# Patient Record
Sex: MALE | Race: WHITE | Employment: OTHER | URBAN - METROPOLITAN AREA
[De-identification: names, ages, dates, MRNs, and addresses within clinical notes are randomized per-mention and may not be internally consistent; named-entity substitution may affect disease eponyms.]

---

## 2018-07-10 ENCOUNTER — HOSPITAL ENCOUNTER (OUTPATIENT)
Dept: ULTRASOUND IMAGING | Age: 63
Discharge: HOME | End: 2018-07-10
Attending: PEDIATRICS
Payer: COMMERCIAL

## 2018-07-10 DIAGNOSIS — I71.40 ABDOMINAL AORTIC ANEURYSM WITHOUT RUPTURE: ICD-10-CM

## 2018-07-10 PROCEDURE — 76775 US EXAM ABDO BACK WALL LIM: CPT

## 2018-07-19 ENCOUNTER — APPOINTMENT (OUTPATIENT)
Dept: CT IMAGING | Age: 63
End: 2018-07-19
Attending: EMERGENCY MEDICINE
Payer: COMMERCIAL

## 2018-07-19 ENCOUNTER — APPOINTMENT (OUTPATIENT)
Dept: GENERAL RADIOLOGY | Age: 63
End: 2018-07-19
Attending: EMERGENCY MEDICINE
Payer: COMMERCIAL

## 2018-07-19 ENCOUNTER — HOSPITAL ENCOUNTER (EMERGENCY)
Age: 63
Discharge: HOME | End: 2018-07-19
Attending: EMERGENCY MEDICINE
Payer: COMMERCIAL

## 2018-07-19 VITALS
OXYGEN SATURATION: 95 % | BODY MASS INDEX: 30.84 KG/M2 | TEMPERATURE: 97.4 F | HEIGHT: 75 IN | HEART RATE: 74 BPM | RESPIRATION RATE: 16 BRPM | WEIGHT: 248.02 LBS | DIASTOLIC BLOOD PRESSURE: 87 MMHG | SYSTOLIC BLOOD PRESSURE: 145 MMHG

## 2018-07-19 DIAGNOSIS — H53.2 DIPLOPIA: ICD-10-CM

## 2018-07-19 DIAGNOSIS — R42 ORTHOSTATIC DIZZINESS: ICD-10-CM

## 2018-07-19 DIAGNOSIS — T67.5XXA HEAT EXHAUSTION, INITIAL ENCOUNTER: Primary | ICD-10-CM

## 2018-07-19 LAB
ALBUMIN SERPL-MCNC: 3.2 G/DL (ref 3.5–5)
ALBUMIN/GLOB SERPL: 0.8 {RATIO} (ref 1.1–2.2)
ALP SERPL-CCNC: 259 U/L (ref 45–117)
ALT SERPL-CCNC: 50 U/L (ref 12–78)
ANION GAP SERPL CALC-SCNC: 7 MMOL/L (ref 5–15)
APPEARANCE UR: CLEAR
AST SERPL-CCNC: 36 U/L (ref 15–37)
ATRIAL RATE: 73 BPM
BACTERIA URNS QL MICRO: NEGATIVE /HPF
BASOPHILS # BLD: 0.1 K/UL (ref 0–0.1)
BASOPHILS NFR BLD: 1 % (ref 0–1)
BILIRUB SERPL-MCNC: 0.5 MG/DL (ref 0.2–1)
BILIRUB UR QL: NEGATIVE
BUN SERPL-MCNC: 8 MG/DL (ref 6–20)
BUN/CREAT SERPL: 10 (ref 12–20)
CALCIUM SERPL-MCNC: 8.7 MG/DL (ref 8.5–10.1)
CALCULATED P AXIS, ECG09: 48 DEGREES
CALCULATED R AXIS, ECG10: 45 DEGREES
CALCULATED T AXIS, ECG11: 23 DEGREES
CHLORIDE SERPL-SCNC: 109 MMOL/L (ref 97–108)
CO2 SERPL-SCNC: 26 MMOL/L (ref 21–32)
COLOR UR: ABNORMAL
CREAT SERPL-MCNC: 0.84 MG/DL (ref 0.7–1.3)
DIAGNOSIS, 93000: NORMAL
DIFFERENTIAL METHOD BLD: NORMAL
EOSINOPHIL # BLD: 0.2 K/UL (ref 0–0.4)
EOSINOPHIL NFR BLD: 2 % (ref 0–7)
EPITH CASTS URNS QL MICRO: ABNORMAL /LPF
ERYTHROCYTE [DISTWIDTH] IN BLOOD BY AUTOMATED COUNT: 13.6 % (ref 11.5–14.5)
GLOBULIN SER CALC-MCNC: 3.9 G/DL (ref 2–4)
GLUCOSE SERPL-MCNC: 112 MG/DL (ref 65–100)
GLUCOSE UR STRIP.AUTO-MCNC: NEGATIVE MG/DL
HCT VFR BLD AUTO: 46.4 % (ref 36.6–50.3)
HGB BLD-MCNC: 15.5 G/DL (ref 12.1–17)
HGB UR QL STRIP: ABNORMAL
HYALINE CASTS URNS QL MICRO: ABNORMAL /LPF (ref 0–5)
IMM GRANULOCYTES # BLD: 0 K/UL (ref 0–0.04)
IMM GRANULOCYTES NFR BLD AUTO: 0 % (ref 0–0.5)
INR PPP: 1 (ref 0.9–1.1)
KETONES UR QL STRIP.AUTO: NEGATIVE MG/DL
LEUKOCYTE ESTERASE UR QL STRIP.AUTO: ABNORMAL
LYMPHOCYTES # BLD: 1.5 K/UL (ref 0.8–3.5)
LYMPHOCYTES NFR BLD: 16 % (ref 12–49)
MAGNESIUM SERPL-MCNC: 2.4 MG/DL (ref 1.6–2.4)
MCH RBC QN AUTO: 32 PG (ref 26–34)
MCHC RBC AUTO-ENTMCNC: 33.4 G/DL (ref 30–36.5)
MCV RBC AUTO: 95.7 FL (ref 80–99)
MONOCYTES # BLD: 0.8 K/UL (ref 0–1)
MONOCYTES NFR BLD: 9 % (ref 5–13)
NEUTS SEG # BLD: 6.5 K/UL (ref 1.8–8)
NEUTS SEG NFR BLD: 72 % (ref 32–75)
NITRITE UR QL STRIP.AUTO: NEGATIVE
NRBC # BLD: 0 K/UL (ref 0–0.01)
NRBC BLD-RTO: 0 PER 100 WBC
P-R INTERVAL, ECG05: 140 MS
PH UR STRIP: 6.5 [PH] (ref 5–8)
PLATELET # BLD AUTO: 202 K/UL (ref 150–400)
PMV BLD AUTO: 10.7 FL (ref 8.9–12.9)
POTASSIUM SERPL-SCNC: 3.5 MMOL/L (ref 3.5–5.1)
PROT SERPL-MCNC: 7.1 G/DL (ref 6.4–8.2)
PROT UR STRIP-MCNC: NEGATIVE MG/DL
PROTHROMBIN TIME: 9.6 SEC (ref 9–11.1)
Q-T INTERVAL, ECG07: 416 MS
QRS DURATION, ECG06: 90 MS
QTC CALCULATION (BEZET), ECG08: 458 MS
RBC # BLD AUTO: 4.85 M/UL (ref 4.1–5.7)
RBC #/AREA URNS HPF: ABNORMAL /HPF (ref 0–5)
SODIUM SERPL-SCNC: 142 MMOL/L (ref 136–145)
SP GR UR REFRACTOMETRY: 1.01 (ref 1–1.03)
UA: UC IF INDICATED,UAUC: ABNORMAL
UROBILINOGEN UR QL STRIP.AUTO: 0.2 EU/DL (ref 0.2–1)
VENTRICULAR RATE, ECG03: 73 BPM
WBC # BLD AUTO: 9 K/UL (ref 4.1–11.1)
WBC URNS QL MICRO: ABNORMAL /HPF (ref 0–4)

## 2018-07-19 PROCEDURE — 96360 HYDRATION IV INFUSION INIT: CPT

## 2018-07-19 PROCEDURE — 80053 COMPREHEN METABOLIC PANEL: CPT | Performed by: EMERGENCY MEDICINE

## 2018-07-19 PROCEDURE — 83735 ASSAY OF MAGNESIUM: CPT | Performed by: EMERGENCY MEDICINE

## 2018-07-19 PROCEDURE — 87086 URINE CULTURE/COLONY COUNT: CPT | Performed by: EMERGENCY MEDICINE

## 2018-07-19 PROCEDURE — 74011250636 HC RX REV CODE- 250/636: Performed by: EMERGENCY MEDICINE

## 2018-07-19 PROCEDURE — 85610 PROTHROMBIN TIME: CPT | Performed by: EMERGENCY MEDICINE

## 2018-07-19 PROCEDURE — 99285 EMERGENCY DEPT VISIT HI MDM: CPT

## 2018-07-19 PROCEDURE — 70450 CT HEAD/BRAIN W/O DYE: CPT

## 2018-07-19 PROCEDURE — 81001 URINALYSIS AUTO W/SCOPE: CPT | Performed by: EMERGENCY MEDICINE

## 2018-07-19 PROCEDURE — 36415 COLL VENOUS BLD VENIPUNCTURE: CPT | Performed by: EMERGENCY MEDICINE

## 2018-07-19 PROCEDURE — 85025 COMPLETE CBC W/AUTO DIFF WBC: CPT | Performed by: EMERGENCY MEDICINE

## 2018-07-19 PROCEDURE — 96361 HYDRATE IV INFUSION ADD-ON: CPT

## 2018-07-19 PROCEDURE — 93005 ELECTROCARDIOGRAM TRACING: CPT

## 2018-07-19 PROCEDURE — 87077 CULTURE AEROBIC IDENTIFY: CPT | Performed by: EMERGENCY MEDICINE

## 2018-07-19 RX ORDER — DEXTROSE 50 % IN WATER (D50W) INTRAVENOUS SYRINGE
25
Status: DISCONTINUED | OUTPATIENT
Start: 2018-07-19 | End: 2018-07-19

## 2018-07-19 RX ORDER — SODIUM CHLORIDE 0.9 % (FLUSH) 0.9 %
5-10 SYRINGE (ML) INJECTION EVERY 8 HOURS
Status: DISCONTINUED | OUTPATIENT
Start: 2018-07-19 | End: 2018-07-19 | Stop reason: HOSPADM

## 2018-07-19 RX ORDER — SODIUM CHLORIDE 0.9 % (FLUSH) 0.9 %
5-10 SYRINGE (ML) INJECTION AS NEEDED
Status: DISCONTINUED | OUTPATIENT
Start: 2018-07-19 | End: 2018-07-19 | Stop reason: HOSPADM

## 2018-07-19 RX ADMIN — SODIUM CHLORIDE 1000 ML: 900 INJECTION, SOLUTION INTRAVENOUS at 15:35

## 2018-07-19 RX ADMIN — SODIUM CHLORIDE 1000 ML: 900 INJECTION, SOLUTION INTRAVENOUS at 14:37

## 2018-07-19 NOTE — ED PROVIDER NOTES
EMERGENCY DEPARTMENT HISTORY AND PHYSICAL EXAM 
 
 
Date: 7/19/2018 Patient Name: Brittanie Nolasco History of Presenting Illness Chief Complaint Patient presents with  Lethargy  
  onset today  Blurred Vision  
  onset today History Provided By: Patient HPI: Brittanie Nolasco, 58 y.o. male with PMHx significant for prostate cancer and anxiety, presents via EMS to the ED with cc of gradually worsening fatigue and blurred vision. Per pt, he has been presenting with worsening fatigue over the course of the past four days with gradual worsening. Pt informs that the onset of the fatigue began after working out in the heat for the past two days when the temperature was noted to be over 100 F outside. Pt reports that the fatigue has been constant since then with exacerbation with movements and activity. Pt reports he has additionally noted blurred vision with increases in the fatigue with two episodes; once two days prior, and once today while driving. Pt reports he was driving back from [place] when he noted that the lines on the road were increasingly blurry. Of note, pt does report he was not wearing his glasses with the first episode of blurred vision, but was wearing them with the second episode. Pt lastly informs of a headache with a mild intensity alongside an associated dull sensation to the posterior base of the neck. Pt states it feels like \"I have activity at the base of my neck\". Pt states he was seen at [facility] prior to arrival with a referral to the ED for a CT. Pt reports fall with head injury one month prior without evaluation. Pt reports no OTC medications or other notable modifying factors. Pt specifically denies any fevers, chills, nausea, vomiting, diarrhea, urinary complications, hematochezia, chest pain, or SOB. PSHx: umbilical hernia, prostatectomy Social Hx: + EtOH; + Smoker; + Illicit Drugs (marijuana) PCP: PROVIDER UNKNOWN There are no other complaints, changes, or physical findings at this time. Current Facility-Administered Medications Medication Dose Route Frequency Provider Last Rate Last Dose  sodium chloride (NS) flush 5-10 mL  5-10 mL IntraVENous Q8H Mary Louise MD      
 sodium chloride (NS) flush 5-10 mL  5-10 mL IntraVENous PRN Mary Louise MD      
 
Current Outpatient Prescriptions Medication Sig Dispense Refill  oxyCODONE-acetaminophen (PERCOCET 7.5) 7.5-325 mg per tablet 1-2 tabs PO [id number] PRN Pain 30 Tab 0  
 multivitamin (ONE A DAY) tablet Take 1 Tab by mouth daily.  CAFFEINE PO Take 200 mg by mouth daily. Past History Past Medical History: 
Past Medical History:  
Diagnosis Date  Cancer ([facility] [address] PROSTATE  Psychiatric disorder ANXIETY (SITUATIONAL) Past Surgical History: 
Past Surgical History:  
Procedure Laterality Date [address] [place] UNLISTED UMBILICAL HERNIA  
 HX GI  2003  
 18 \" COLON RESECTION (DIVERTICULITIS)  HX UROLOGICAL CYSTO/BIOPSY PROSTATE Family History: 
Family History Problem Relation Age of Onset  Cancer Mother LUNG  
 Heart Disease Mother  Cancer Father PROSTATE  Diabetes Maternal Grandmother  Anesth Problems Neg Hx Social History: 
Social History Substance Use Topics  Smoking status: Current Every Day Smoker Packs/day: 1.00 Years: 47.00  Smokeless tobacco: Never Used  Alcohol use 3.0 oz/week 6 Cans of beer per week Allergies: 
No Known Allergies Review of Systems Review of Systems Constitutional: Positive for fatigue. Negative for chills and fever. Eyes: Positive for visual disturbance (blurred). Respiratory: Negative for cough and shortness of breath. Cardiovascular: Negative for chest pain and palpitations. Gastrointestinal: Negative for abdominal pain, constipation, diarrhea, nausea and vomiting. Genitourinary: Negative for frequency.   
Neurological: Negative for dizziness, weakness, numbness and headaches. All other systems reviewed and are negative. Physical Exam  
Physical Exam  
Constitutional: He is oriented to person, place, and time. He appears well-developed and well-nourished. Flushed HENT:  
Head: Normocephalic. Eyes: EOM are normal. Pupils are equal, round, and reactive to light. Peripheral vision intact Neck: Normal range of motion. Carotid bruit is not present. Cardiovascular: Normal rate and regular rhythm. Pulmonary/Chest: Effort normal and breath sounds normal.  
Abdominal: Soft. He exhibits no distension. There is no tenderness. Neurological: He is alert and oriented to person, place, and time. No cranial nerve deficit. CN 2-12 intact, 5/5 strength in all 4 extremities, Finger to nose intact, negative Romberg Skin: Skin is warm and dry. Psychiatric: He has a normal mood and affect. Nursing note and vitals reviewed. Diagnostic Study Results Labs - Recent Results (from the past 12 hour(s)) METABOLIC PANEL, COMPREHENSIVE Collection Time: 07/19/18  1:39 PM  
Result Value Ref Range Sodium 142 136 - 145 mmol/L Potassium 3.5 3.5 - 5.1 mmol/L Chloride 109 (H) 97 - 108 mmol/L  
 CO2 26 21 - 32 mmol/L Anion gap 7 5 - 15 mmol/L Glucose 112 (H) 65 - 100 mg/dL BUN 8 6 - 20 MG/DL Creatinine 0.84 0.70 - 1.30 MG/DL  
 BUN/Creatinine ratio 10 (L) 12 - 20 GFR est AA >60 >60 ml/min/1.73m2 GFR est non-AA >60 >60 ml/min/1.73m2 Calcium 8.7 8.5 - 10.1 MG/DL Bilirubin, total 0.5 0.2 - 1.0 MG/DL  
 ALT (SGPT) 50 12 - 78 U/L  
 AST (SGOT) 36 15 - 37 U/L Alk. phosphatase 259 (H) 45 - 117 U/L Protein, total 7.1 6.4 - 8.2 g/dL Albumin 3.2 (L) 3.5 - 5.0 g/dL Globulin 3.9 2.0 - 4.0 g/dL A-G Ratio 0.8 (L) 1.1 - 2.2    
CBC WITH AUTOMATED DIFF Collection Time: 07/19/18  1:39 PM  
Result Value Ref Range WBC 9.0 4.1 - 11.1 K/uL  
 RBC 4.85 4.10 - 5.70 M/uL  
 HGB 15.5 12.1 - 17.0 g/dL  HCT 46.4 36.6 - 50.3 % MCV 95.7 80.0 - 99.0 FL  
 MCH 32.0 26.0 - 34.0 PG  
 MCHC 33.4 30.0 - 36.5 g/dL  
 RDW 13.6 11.5 - 14.5 % PLATELET 559 483 - 473 K/uL MPV 10.7 8.9 - 12.9 FL  
 NRBC 0.0 0  WBC ABSOLUTE NRBC 0.00 0.00 - 0.01 K/uL NEUTROPHILS 72 32 - 75 % LYMPHOCYTES 16 12 - 49 % MONOCYTES 9 5 - 13 % EOSINOPHILS 2 0 - 7 % BASOPHILS 1 0 - 1 % IMMATURE GRANULOCYTES 0 0.0 - 0.5 % ABS. NEUTROPHILS 6.5 1.8 - 8.0 K/UL  
 ABS. LYMPHOCYTES 1.5 0.8 - 3.5 K/UL  
 ABS. MONOCYTES 0.8 0.0 - 1.0 K/UL  
 ABS. EOSINOPHILS 0.2 0.0 - 0.4 K/UL  
 ABS. BASOPHILS 0.1 0.0 - 0.1 K/UL  
 ABS. IMM. GRANS. 0.0 0.00 - 0.04 K/UL  
 DF AUTOMATED MAGNESIUM Collection Time: 07/19/18  1:39 PM  
Result Value Ref Range Magnesium 2.4 1.6 - 2.4 mg/dL PROTHROMBIN TIME + INR Collection Time: 07/19/18  1:39 PM  
Result Value Ref Range INR 1.0 0.9 - 1.1 Prothrombin time 9.6 9.0 - 11.1 sec EKG, 12 LEAD, INITIAL Collection Time: 07/19/18  2:24 PM  
Result Value Ref Range Ventricular Rate 73 BPM  
 Atrial Rate 73 BPM  
 P-R Interval 140 ms QRS Duration 90 ms Q-T Interval 416 ms  
 QTC Calculation (Bezet) 458 ms Calculated P Axis 48 degrees Calculated R Axis 45 degrees Calculated T Axis 23 degrees Diagnosis Sinus rhythm with marked sinus arrhythmia When compared with ECG of 21-JAN-2014 16:21, 
premature atrial complexes are no longer present Confirmed by Butlerzac Alberta ([id number]) on 7/19/2018 6:03:37 PM 
  
URINALYSIS W/ REFLEX CULTURE Collection Time: 07/19/18  3:54 PM  
Result Value Ref Range Color YELLOW/STRAW Appearance CLEAR CLEAR Specific gravity 1.011 1.003 - 1.030    
 pH (UA) 6.5 5.0 - 8.0 Protein NEGATIVE  NEG mg/dL Glucose NEGATIVE  NEG mg/dL Ketone NEGATIVE  NEG mg/dL Bilirubin NEGATIVE  NEG Blood TRACE (A) NEG Urobilinogen 0.2 0.2 - 1.0 EU/dL Nitrites NEGATIVE  NEG  Leukocyte Esterase SMALL (A) NEG    
 WBC 5-10 0 - 4 /hpf  
 RBC 5-10 0 - 5 /hpf Epithelial cells FEW FEW /lpf Bacteria NEGATIVE  NEG /hpf  
 UA:UC IF INDICATED URINE CULTURE ORDERED (A) CNI Hyaline cast 0-2 0 - 5 /lpf Radiologic Studies -  
CT HEAD WO CONT Final Result XR CHEST SNGL V    (Results Pending) Ct Head Wo Cont Result Date: 7/19/2018 IMPRESSION: No confluent infarct or hemorrhage or mass effect. Medical Decision Making I am the first provider for this patient. I reviewed the vital signs, available nursing notes, past medical history, past surgical history, family history and social history. Vital Signs-Reviewed the patient's vital signs. Patient Vitals for the past 12 hrs: 
 Temp Pulse Resp BP SpO2  
07/19/18 1714 - - - 145/87 95 % 07/19/18 1535 - - - 154/88 95 % 07/19/18 1457 - - - 184/89 96 % 07/19/18 1407 - - - - 93 % 07/19/18 1316 97.4 °F (36.3 °C) 74 16 146/89 94 % Pulse Oximetry Analysis - 94% on RA Cardiac Monitor:  
Rate: 74 bpm 
Rhythm: Normal Sinus Rhythm EKG interpretation: (1424) Rhythm: normal sinus rhythm; and with sinus arrhythmia. Rate (approx.): 73; Axis: normal; AZ interval: normal; QRS interval: normal ; ST/T wave: normal;  
Written by Timi Broussard, ED Scribe, as dictated by Pineda Arana MD. Records Reviewed: Nursing Notes and Old Medical Records Provider Notes (Medical Decision Making):  
Patient presenting with generalized fatigue. DDx: infection, anemia, electrolyte anomoly (hypo or hyperkalemia, hypomagnesemia), hypothyroid, dehydration, heat exhaustion, depression, CA, ACS. Will obtain EKG, UA, labwork for any urgent/emergent pathology. ED Course:  
Initial assessment performed. The patients presenting problems have been discussed, and they are in agreement with the care plan formulated and outlined with them. I have encouraged them to ask questions as they arise throughout their visit.  
 
Progress Note:  
3:15 PM 
RN informs visual acuity is 20/50 in the L eye and 20/40 in the R eye. Written by AMBREEN Guevaraibe, as dictated by Khanh Kent MD.  
 
Progress Note:  
3:38 PM 
Pt ambulated to the bathroom without syncope, but mild LH. Stable on feet. Written by AMBREEN Guevaraibe, as dictated by Khanh Kent MD.  
 
SIGN OUT:  
4:00 PM 
Patient's presentation, labs/imaging and plan of care was reviewed with Nic Shaikh DO as part of sign out. They will follow up on UA/fluids as part of the plan discussed with the patient. Nic Shaikh DO's assistance in completion of this plan is greatly appreciated but it should be noted that I will be the provider of record for this patient. This note is prepared by AMBREEN Guevara, as dictated by Khanh Kent MD 
 
Progress Note:  
4:15 PM 
Updated pt on all returned results and findings. Discussed the importance of proper follow up as referred below alongside return precautions. Pt in agreement with the further progression of care plan and expresses agreement with and understanding of all items discussed. Disposition: 
DISCHARGE NOTE: 
4:18 PM 
The patient is ready for discharge. The patient signs, symptoms, diagnosis, and discharge instructions have been discussed and the patient has conveyed their understanding. The patient is to follow-up as reccommended or returned to the ER should their symptoms worsen. Plan has been discussed and patient is in agreement. PLAN: 
1. Discharge Medication List as of 7/19/2018  4:18 PM  
  
 
2. Follow-up Information Follow up With Details Comments Contact Info Provider Unknown  As needed Patient not available to ask Optometry  If vision symptoms persist   
  
 
Return to ED if worse Diagnosis Clinical Impression: 1. Heat exhaustion, initial encounter 2. Orthostatic dizziness 3. Diplopia Attestations:  
This note is prepared by Joseph Gonzalez, acting as Scribe for Khanh Kent MD. 
 
Negrito Eduardo MD: The scribe's documentation has been prepared under my direction and personally reviewed by me in its entirety. I confirm that the note above accurately reflects all work, treatment, procedures, and medical decision making performed by me. This note will not be viewable in [address]

## 2018-07-19 NOTE — LETTER
[address] 
\[facility]\"" EMERGENCY DEPT 
[address] [place] [address] [address] [id number] 
[phone] Work/School Note Date: 7/19/2018 To Whom It May concern: 
 
Gurvinder Metzger was seen and treated today in the emergency room by the following provider(s): 
Attending Provider: Tiffanie Miller MD.   
 
Gurvinder Metzger may return to work on 7/21/18. Sincerely, Tiffanie Miller MD

## 2018-07-20 LAB
BACTERIA SPEC CULT: ABNORMAL
BACTERIA SPEC CULT: ABNORMAL
CC UR VC: ABNORMAL
SERVICE CMNT-IMP: ABNORMAL

## 2018-07-21 NOTE — PROGRESS NOTES
Pt not treated with antibiotics at time of discharge. Per chart, patient with dizziness and recent head injury. Attempted to reach patient to see if he remained symptomatic and review urine culture results to determine if antibiotics might be necessary; however, no answer. HIPAA compliant  left requesting return call.    1:02 PM  7/22/18  Pt returned call. Reviewed urine culture results. Pt remains symptomatic, fatigue and lightheadedness.   Will efile Keflex 500mg BID to [facility] at his request.

## 2018-07-22 RX ORDER — CEPHALEXIN 500 MG/1
500 CAPSULE ORAL 2 TIMES DAILY
Qty: 14 CAP | Refills: 0 | Status: SHIPPED | OUTPATIENT
Start: 2018-07-22 | End: 2018-07-29

## 2019-05-13 ENCOUNTER — APPOINTMENT (OUTPATIENT)
Dept: GENERAL RADIOLOGY | Age: 64
DRG: 309 | End: 2019-05-13
Attending: EMERGENCY MEDICINE
Payer: COMMERCIAL

## 2019-05-13 ENCOUNTER — HOSPITAL ENCOUNTER (INPATIENT)
Age: 64
LOS: 2 days | Discharge: HOME | DRG: 309 | End: 2019-05-15
Attending: EMERGENCY MEDICINE | Admitting: INTERNAL MEDICINE
Payer: COMMERCIAL

## 2019-05-13 DIAGNOSIS — I48.91 ATRIAL FIBRILLATION WITH RVR (HCC): Primary | ICD-10-CM

## 2019-05-13 PROBLEM — E78.5 HYPERLIPIDEMIA: Status: ACTIVE | Noted: 2019-05-13

## 2019-05-13 PROBLEM — I10 HTN (HYPERTENSION): Status: ACTIVE | Noted: 2019-05-13

## 2019-05-13 PROBLEM — J44.1 COPD EXACERBATION (HCC): Status: ACTIVE | Noted: 2019-05-13

## 2019-05-13 LAB
ALBUMIN SERPL-MCNC: 3.5 G/DL (ref 3.5–5)
ALBUMIN/GLOB SERPL: 0.9 {RATIO} (ref 1.1–2.2)
ALP SERPL-CCNC: 119 U/L (ref 45–117)
ALT SERPL-CCNC: 68 U/L (ref 12–78)
ANION GAP SERPL CALC-SCNC: 7 MMOL/L (ref 5–15)
AST SERPL-CCNC: 36 U/L (ref 15–37)
BASOPHILS # BLD: 0.1 K/UL (ref 0–0.1)
BASOPHILS NFR BLD: 0 % (ref 0–1)
BILIRUB SERPL-MCNC: 0.5 MG/DL (ref 0.2–1)
BUN SERPL-MCNC: 21 MG/DL (ref 6–20)
BUN/CREAT SERPL: 23 (ref 12–20)
CALCIUM SERPL-MCNC: 8.4 MG/DL (ref 8.5–10.1)
CHLORIDE SERPL-SCNC: 107 MMOL/L (ref 97–108)
CO2 SERPL-SCNC: 25 MMOL/L (ref 21–32)
CREAT SERPL-MCNC: 0.9 MG/DL (ref 0.7–1.3)
DIFFERENTIAL METHOD BLD: ABNORMAL
EOSINOPHIL # BLD: 0 K/UL (ref 0–0.4)
EOSINOPHIL NFR BLD: 0 % (ref 0–7)
ERYTHROCYTE [DISTWIDTH] IN BLOOD BY AUTOMATED COUNT: 13.5 % (ref 11.5–14.5)
GLOBULIN SER CALC-MCNC: 3.7 G/DL (ref 2–4)
GLUCOSE SERPL-MCNC: 168 MG/DL (ref 65–100)
HCT VFR BLD AUTO: 48.5 % (ref 36.6–50.3)
HGB BLD-MCNC: 16.9 G/DL (ref 12.1–17)
IMM GRANULOCYTES # BLD AUTO: 0.2 K/UL (ref 0–0.04)
IMM GRANULOCYTES NFR BLD AUTO: 1 % (ref 0–0.5)
INR PPP: 1 (ref 0.9–1.1)
LYMPHOCYTES # BLD: 1.6 K/UL (ref 0.8–3.5)
LYMPHOCYTES NFR BLD: 10 % (ref 12–49)
MAGNESIUM SERPL-MCNC: 2.2 MG/DL (ref 1.6–2.4)
MCH RBC QN AUTO: 32.3 PG (ref 26–34)
MCHC RBC AUTO-ENTMCNC: 34.8 G/DL (ref 30–36.5)
MCV RBC AUTO: 92.7 FL (ref 80–99)
MONOCYTES # BLD: 1.4 K/UL (ref 0–1)
MONOCYTES NFR BLD: 9 % (ref 5–13)
NEUTS SEG # BLD: 12.3 K/UL (ref 1.8–8)
NEUTS SEG NFR BLD: 80 % (ref 32–75)
NRBC # BLD: 0 K/UL (ref 0–0.01)
NRBC BLD-RTO: 0 PER 100 WBC
PLATELET # BLD AUTO: 267 K/UL (ref 150–400)
PMV BLD AUTO: 10.2 FL (ref 8.9–12.9)
POTASSIUM SERPL-SCNC: 4.2 MMOL/L (ref 3.5–5.1)
PROT SERPL-MCNC: 7.2 G/DL (ref 6.4–8.2)
PROTHROMBIN TIME: 10.5 SEC (ref 9–11.1)
RBC # BLD AUTO: 5.23 M/UL (ref 4.1–5.7)
SODIUM SERPL-SCNC: 139 MMOL/L (ref 136–145)
TROPONIN I SERPL-MCNC: <0.05 NG/ML
TSH SERPL DL<=0.05 MIU/L-ACNC: 0.04 UIU/ML (ref 0.36–3.74)
WBC # BLD AUTO: 15.5 K/UL (ref 4.1–11.1)

## 2019-05-13 PROCEDURE — 65660000000 HC RM CCU STEPDOWN

## 2019-05-13 PROCEDURE — 85025 COMPLETE CBC W/AUTO DIFF WBC: CPT

## 2019-05-13 PROCEDURE — 74011000250 HC RX REV CODE- 250: Performed by: EMERGENCY MEDICINE

## 2019-05-13 PROCEDURE — 83735 ASSAY OF MAGNESIUM: CPT

## 2019-05-13 PROCEDURE — 71045 X-RAY EXAM CHEST 1 VIEW: CPT

## 2019-05-13 PROCEDURE — 80053 COMPREHEN METABOLIC PANEL: CPT

## 2019-05-13 PROCEDURE — 65270000029 HC RM PRIVATE

## 2019-05-13 PROCEDURE — 96365 THER/PROPH/DIAG IV INF INIT: CPT

## 2019-05-13 PROCEDURE — 36415 COLL VENOUS BLD VENIPUNCTURE: CPT

## 2019-05-13 PROCEDURE — 85610 PROTHROMBIN TIME: CPT

## 2019-05-13 PROCEDURE — 74011000258 HC RX REV CODE- 258: Performed by: EMERGENCY MEDICINE

## 2019-05-13 PROCEDURE — 84484 ASSAY OF TROPONIN QUANT: CPT

## 2019-05-13 PROCEDURE — 96361 HYDRATE IV INFUSION ADD-ON: CPT

## 2019-05-13 PROCEDURE — 84443 ASSAY THYROID STIM HORMONE: CPT

## 2019-05-13 PROCEDURE — 99284 EMERGENCY DEPT VISIT MOD MDM: CPT

## 2019-05-13 PROCEDURE — 96366 THER/PROPH/DIAG IV INF ADDON: CPT

## 2019-05-13 PROCEDURE — 74011250636 HC RX REV CODE- 250/636: Performed by: EMERGENCY MEDICINE

## 2019-05-13 PROCEDURE — 93005 ELECTROCARDIOGRAM TRACING: CPT

## 2019-05-13 RX ORDER — BENZONATATE 100 MG/1
200 CAPSULE ORAL
Status: DISCONTINUED | OUTPATIENT
Start: 2019-05-13 | End: 2019-05-15 | Stop reason: HOSPADM

## 2019-05-13 RX ORDER — LEVALBUTEROL INHALATION SOLUTION 1.25 MG/3ML
1.25 SOLUTION RESPIRATORY (INHALATION)
Status: DISCONTINUED | OUTPATIENT
Start: 2019-05-14 | End: 2019-05-15

## 2019-05-13 RX ORDER — ALBUTEROL SULFATE 90 UG/1
AEROSOL, METERED RESPIRATORY (INHALATION)
COMMUNITY

## 2019-05-13 RX ORDER — DILTIAZEM HYDROCHLORIDE 5 MG/ML
10 INJECTION INTRAVENOUS
Status: COMPLETED | OUTPATIENT
Start: 2019-05-13 | End: 2019-05-13

## 2019-05-13 RX ORDER — PREDNISONE 10 MG/1
TABLET ORAL SEE ADMIN INSTRUCTIONS
COMMUNITY

## 2019-05-13 RX ORDER — GUAIFENESIN 600 MG/1
600 TABLET, EXTENDED RELEASE ORAL EVERY 12 HOURS
Status: DISCONTINUED | OUTPATIENT
Start: 2019-05-13 | End: 2019-05-15 | Stop reason: HOSPADM

## 2019-05-13 RX ORDER — ROSUVASTATIN CALCIUM 10 MG/1
5 TABLET, COATED ORAL DAILY
Status: DISCONTINUED | OUTPATIENT
Start: 2019-05-14 | End: 2019-05-15 | Stop reason: HOSPADM

## 2019-05-13 RX ORDER — ROSUVASTATIN CALCIUM 5 MG/1
5 TABLET, COATED ORAL DAILY
COMMUNITY

## 2019-05-13 RX ORDER — DOXYCYCLINE HYCLATE 100 MG
100 TABLET ORAL 2 TIMES DAILY
Status: DISCONTINUED | OUTPATIENT
Start: 2019-05-14 | End: 2019-05-15 | Stop reason: HOSPADM

## 2019-05-13 RX ORDER — AMLODIPINE BESYLATE 10 MG/1
10 TABLET ORAL DAILY
COMMUNITY

## 2019-05-13 RX ORDER — SODIUM CHLORIDE 0.9 % (FLUSH) 0.9 %
5-40 SYRINGE (ML) INJECTION EVERY 8 HOURS
Status: DISCONTINUED | OUTPATIENT
Start: 2019-05-13 | End: 2019-05-15 | Stop reason: HOSPADM

## 2019-05-13 RX ORDER — ENOXAPARIN SODIUM 150 MG/ML
105 INJECTION SUBCUTANEOUS EVERY 12 HOURS
Status: DISCONTINUED | OUTPATIENT
Start: 2019-05-14 | End: 2019-05-14

## 2019-05-13 RX ORDER — ONDANSETRON 2 MG/ML
4 INJECTION INTRAMUSCULAR; INTRAVENOUS
Status: DISCONTINUED | OUTPATIENT
Start: 2019-05-13 | End: 2019-05-15 | Stop reason: HOSPADM

## 2019-05-13 RX ORDER — SODIUM CHLORIDE 0.9 % (FLUSH) 0.9 %
5-40 SYRINGE (ML) INJECTION AS NEEDED
Status: DISCONTINUED | OUTPATIENT
Start: 2019-05-13 | End: 2019-05-15 | Stop reason: HOSPADM

## 2019-05-13 RX ORDER — ACETAMINOPHEN 325 MG/1
650 TABLET ORAL
Status: DISCONTINUED | OUTPATIENT
Start: 2019-05-13 | End: 2019-05-15 | Stop reason: HOSPADM

## 2019-05-13 RX ORDER — DOXYCYCLINE 100 MG/1
100 CAPSULE ORAL 2 TIMES DAILY
COMMUNITY

## 2019-05-13 RX ADMIN — DILTIAZEM HYDROCHLORIDE 10 MG: 5 INJECTION INTRAVENOUS at 20:48

## 2019-05-13 RX ADMIN — SODIUM CHLORIDE 1000 ML: 900 INJECTION, SOLUTION INTRAVENOUS at 20:45

## 2019-05-13 RX ADMIN — DILTIAZEM HYDROCHLORIDE 5 MG/HR: 5 INJECTION INTRAVENOUS at 21:10

## 2019-05-14 ENCOUNTER — TELEPHONE (OUTPATIENT)
Dept: ENDOCRINOLOGY | Age: 64
End: 2019-05-14

## 2019-05-14 ENCOUNTER — APPOINTMENT (OUTPATIENT)
Dept: NON INVASIVE DIAGNOSTICS | Age: 64
DRG: 309 | End: 2019-05-14
Attending: NURSE PRACTITIONER
Payer: COMMERCIAL

## 2019-05-14 LAB
ALBUMIN SERPL-MCNC: 3.5 G/DL (ref 3.5–5)
ALBUMIN/GLOB SERPL: 1 {RATIO} (ref 1.1–2.2)
ALP SERPL-CCNC: 107 U/L (ref 45–117)
ALT SERPL-CCNC: 68 U/L (ref 12–78)
ANION GAP SERPL CALC-SCNC: 6 MMOL/L (ref 5–15)
AST SERPL-CCNC: 38 U/L (ref 15–37)
ATRIAL RATE: 127 BPM
BASOPHILS # BLD: 0 K/UL (ref 0–0.1)
BASOPHILS NFR BLD: 0 % (ref 0–1)
BILIRUB SERPL-MCNC: 1 MG/DL (ref 0.2–1)
BUN SERPL-MCNC: 18 MG/DL (ref 6–20)
BUN/CREAT SERPL: 23 (ref 12–20)
CALCIUM SERPL-MCNC: 8.5 MG/DL (ref 8.5–10.1)
CALCULATED R AXIS, ECG10: 65 DEGREES
CALCULATED T AXIS, ECG11: -79 DEGREES
CHLORIDE SERPL-SCNC: 108 MMOL/L (ref 97–108)
CO2 SERPL-SCNC: 24 MMOL/L (ref 21–32)
CREAT SERPL-MCNC: 0.8 MG/DL (ref 0.7–1.3)
DIAGNOSIS, 93000: NORMAL
DIFFERENTIAL METHOD BLD: ABNORMAL
EOSINOPHIL # BLD: 0 K/UL (ref 0–0.4)
EOSINOPHIL NFR BLD: 0 % (ref 0–7)
ERYTHROCYTE [DISTWIDTH] IN BLOOD BY AUTOMATED COUNT: 13.7 % (ref 11.5–14.5)
GLOBULIN SER CALC-MCNC: 3.5 G/DL (ref 2–4)
GLUCOSE SERPL-MCNC: 162 MG/DL (ref 65–100)
HCT VFR BLD AUTO: 48.3 % (ref 36.6–50.3)
HGB BLD-MCNC: 15.7 G/DL (ref 12.1–17)
IMM GRANULOCYTES # BLD AUTO: 0.2 K/UL (ref 0–0.04)
IMM GRANULOCYTES NFR BLD AUTO: 1 % (ref 0–0.5)
LYMPHOCYTES # BLD: 1 K/UL (ref 0.8–3.5)
LYMPHOCYTES NFR BLD: 6 % (ref 12–49)
MCH RBC QN AUTO: 31.1 PG (ref 26–34)
MCHC RBC AUTO-ENTMCNC: 32.5 G/DL (ref 30–36.5)
MCV RBC AUTO: 95.6 FL (ref 80–99)
MONOCYTES # BLD: 0.8 K/UL (ref 0–1)
MONOCYTES NFR BLD: 5 % (ref 5–13)
NEUTS SEG # BLD: 13.7 K/UL (ref 1.8–8)
NEUTS SEG NFR BLD: 88 % (ref 32–75)
NRBC # BLD: 0 K/UL (ref 0–0.01)
NRBC BLD-RTO: 0 PER 100 WBC
PLATELET # BLD AUTO: 209 K/UL (ref 150–400)
PMV BLD AUTO: 10.1 FL (ref 8.9–12.9)
POTASSIUM SERPL-SCNC: 4 MMOL/L (ref 3.5–5.1)
PROT SERPL-MCNC: 7 G/DL (ref 6.4–8.2)
Q-T INTERVAL, ECG07: 268 MS
QRS DURATION, ECG06: 88 MS
QTC CALCULATION (BEZET), ECG08: 438 MS
RBC # BLD AUTO: 5.05 M/UL (ref 4.1–5.7)
SODIUM SERPL-SCNC: 138 MMOL/L (ref 136–145)
T4 FREE SERPL-MCNC: 1.6 NG/DL (ref 0.8–1.5)
TSH SERPL DL<=0.05 MIU/L-ACNC: 0.05 UIU/ML (ref 0.36–3.74)
VENTRICULAR RATE, ECG03: 161 BPM
WBC # BLD AUTO: 15.7 K/UL (ref 4.1–11.1)

## 2019-05-14 PROCEDURE — 77030029684 HC NEB SM VOL KT MONA -A

## 2019-05-14 PROCEDURE — 80053 COMPREHEN METABOLIC PANEL: CPT

## 2019-05-14 PROCEDURE — 93005 ELECTROCARDIOGRAM TRACING: CPT

## 2019-05-14 PROCEDURE — 65660000000 HC RM CCU STEPDOWN

## 2019-05-14 PROCEDURE — 74011000250 HC RX REV CODE- 250: Performed by: INTERNAL MEDICINE

## 2019-05-14 PROCEDURE — 77030018729 HC ELECTRD DEFIB PAD CARD -B

## 2019-05-14 PROCEDURE — 74011000258 HC RX REV CODE- 258: Performed by: INTERNAL MEDICINE

## 2019-05-14 PROCEDURE — 83520 IMMUNOASSAY QUANT NOS NONAB: CPT

## 2019-05-14 PROCEDURE — 5A2204Z RESTORATION OF CARDIAC RHYTHM, SINGLE: ICD-10-PCS | Performed by: INTERNAL MEDICINE

## 2019-05-14 PROCEDURE — 94640 AIRWAY INHALATION TREATMENT: CPT

## 2019-05-14 PROCEDURE — 36415 COLL VENOUS BLD VENIPUNCTURE: CPT

## 2019-05-14 PROCEDURE — 84480 ASSAY TRIIODOTHYRONINE (T3): CPT

## 2019-05-14 PROCEDURE — 99152 MOD SED SAME PHYS/QHP 5/>YRS: CPT

## 2019-05-14 PROCEDURE — 84443 ASSAY THYROID STIM HORMONE: CPT

## 2019-05-14 PROCEDURE — 74011250636 HC RX REV CODE- 250/636

## 2019-05-14 PROCEDURE — 74011250636 HC RX REV CODE- 250/636: Performed by: INTERNAL MEDICINE

## 2019-05-14 PROCEDURE — 99153 MOD SED SAME PHYS/QHP EA: CPT

## 2019-05-14 PROCEDURE — 74011250637 HC RX REV CODE- 250/637: Performed by: NURSE PRACTITIONER

## 2019-05-14 PROCEDURE — 74011250637 HC RX REV CODE- 250/637: Performed by: INTERNAL MEDICINE

## 2019-05-14 PROCEDURE — B24BZZ4 ULTRASONOGRAPHY OF HEART WITH AORTA, TRANSESOPHAGEAL: ICD-10-PCS | Performed by: INTERNAL MEDICINE

## 2019-05-14 PROCEDURE — 93312 ECHO TRANSESOPHAGEAL: CPT

## 2019-05-14 PROCEDURE — 84439 ASSAY OF FREE THYROXINE: CPT

## 2019-05-14 PROCEDURE — 86376 MICROSOMAL ANTIBODY EACH: CPT

## 2019-05-14 PROCEDURE — 77010033678 HC OXYGEN DAILY

## 2019-05-14 PROCEDURE — 94760 N-INVAS EAR/PLS OXIMETRY 1: CPT

## 2019-05-14 PROCEDURE — 85025 COMPLETE CBC W/AUTO DIFF WBC: CPT

## 2019-05-14 PROCEDURE — 92960 CARDIOVERSION ELECTRIC EXT: CPT

## 2019-05-14 PROCEDURE — 84445 ASSAY OF TSI GLOBULIN: CPT

## 2019-05-14 RX ORDER — LORAZEPAM 2 MG/ML
2 INJECTION INTRAMUSCULAR
Status: DISCONTINUED | OUTPATIENT
Start: 2019-05-14 | End: 2019-05-15 | Stop reason: HOSPADM

## 2019-05-14 RX ORDER — AMIODARONE HYDROCHLORIDE 200 MG/1
400 TABLET ORAL 3 TIMES DAILY
Status: DISCONTINUED | OUTPATIENT
Start: 2019-05-14 | End: 2019-05-15 | Stop reason: HOSPADM

## 2019-05-14 RX ORDER — LORAZEPAM 2 MG/ML
1 INJECTION INTRAMUSCULAR
Status: DISCONTINUED | OUTPATIENT
Start: 2019-05-14 | End: 2019-05-15 | Stop reason: HOSPADM

## 2019-05-14 RX ORDER — AMIODARONE HYDROCHLORIDE 200 MG/1
200 TABLET ORAL 2 TIMES DAILY
Status: DISCONTINUED | OUTPATIENT
Start: 2019-05-17 | End: 2019-05-15 | Stop reason: HOSPADM

## 2019-05-14 RX ORDER — PREDNISONE 20 MG/1
40 TABLET ORAL
Status: DISCONTINUED | OUTPATIENT
Start: 2019-05-15 | End: 2019-05-15 | Stop reason: HOSPADM

## 2019-05-14 RX ORDER — FENTANYL CITRATE 50 UG/ML
25-50 INJECTION, SOLUTION INTRAMUSCULAR; INTRAVENOUS
Status: DISCONTINUED | OUTPATIENT
Start: 2019-05-14 | End: 2019-05-14

## 2019-05-14 RX ORDER — MIDAZOLAM HYDROCHLORIDE 1 MG/ML
.5-2 INJECTION, SOLUTION INTRAMUSCULAR; INTRAVENOUS
Status: DISCONTINUED | OUTPATIENT
Start: 2019-05-14 | End: 2019-05-14

## 2019-05-14 RX ORDER — METHIMAZOLE 5 MG/1
5 TABLET ORAL DAILY
Status: DISCONTINUED | OUTPATIENT
Start: 2019-05-14 | End: 2019-05-14

## 2019-05-14 RX ORDER — LIDOCAINE HYDROCHLORIDE 20 MG/ML
15 SOLUTION OROPHARYNGEAL ONCE
Status: COMPLETED | OUTPATIENT
Start: 2019-05-14 | End: 2019-05-14

## 2019-05-14 RX ORDER — METHIMAZOLE 5 MG/1
10 TABLET ORAL DAILY
Status: DISCONTINUED | OUTPATIENT
Start: 2019-05-14 | End: 2019-05-15 | Stop reason: HOSPADM

## 2019-05-14 RX ADMIN — MIDAZOLAM HYDROCHLORIDE 1 MG: 1 INJECTION, SOLUTION INTRAMUSCULAR; INTRAVENOUS at 14:27

## 2019-05-14 RX ADMIN — Medication 10 ML: at 16:26

## 2019-05-14 RX ADMIN — BENZONATATE 200 MG: 100 CAPSULE ORAL at 00:19

## 2019-05-14 RX ADMIN — MIDAZOLAM HYDROCHLORIDE 1 MG: 1 INJECTION, SOLUTION INTRAMUSCULAR; INTRAVENOUS at 14:16

## 2019-05-14 RX ADMIN — BENZOCAINE, BUTAMBEN, AND TETRACAINE HYDROCHLORIDE 1 SPRAY: .028; .004; .004 AEROSOL, SPRAY TOPICAL at 14:06

## 2019-05-14 RX ADMIN — DILTIAZEM HYDROCHLORIDE 15 MG/HR: 5 INJECTION INTRAVENOUS at 00:52

## 2019-05-14 RX ADMIN — Medication 10 ML: at 06:40

## 2019-05-14 RX ADMIN — LEVALBUTEROL HYDROCHLORIDE 1.25 MG: 1.25 SOLUTION RESPIRATORY (INHALATION) at 19:56

## 2019-05-14 RX ADMIN — METHYLPREDNISOLONE SODIUM SUCCINATE 40 MG: 40 INJECTION, POWDER, FOR SOLUTION INTRAMUSCULAR; INTRAVENOUS at 17:07

## 2019-05-14 RX ADMIN — FENTANYL CITRATE 25 MCG: 50 INJECTION, SOLUTION INTRAMUSCULAR; INTRAVENOUS at 14:22

## 2019-05-14 RX ADMIN — DILTIAZEM HYDROCHLORIDE 15 MG/HR: 5 INJECTION INTRAVENOUS at 09:06

## 2019-05-14 RX ADMIN — DOXYCYCLINE HYCLATE 100 MG: 100 TABLET, COATED ORAL at 08:57

## 2019-05-14 RX ADMIN — FENTANYL CITRATE 25 MCG: 50 INJECTION, SOLUTION INTRAMUSCULAR; INTRAVENOUS at 14:15

## 2019-05-14 RX ADMIN — GUAIFENESIN 600 MG: 600 TABLET, EXTENDED RELEASE ORAL at 08:57

## 2019-05-14 RX ADMIN — GUAIFENESIN 600 MG: 600 TABLET, EXTENDED RELEASE ORAL at 21:34

## 2019-05-14 RX ADMIN — ROSUVASTATIN CALCIUM 5 MG: 10 TABLET, FILM COATED ORAL at 08:57

## 2019-05-14 RX ADMIN — MIDAZOLAM HYDROCHLORIDE 1 MG: 1 INJECTION, SOLUTION INTRAMUSCULAR; INTRAVENOUS at 14:21

## 2019-05-14 RX ADMIN — DOXYCYCLINE HYCLATE 100 MG: 100 TABLET, COATED ORAL at 17:07

## 2019-05-14 RX ADMIN — AMIODARONE HYDROCHLORIDE 1 MG/MIN: 50 INJECTION, SOLUTION INTRAVENOUS at 00:47

## 2019-05-14 RX ADMIN — MIDAZOLAM HYDROCHLORIDE 1 MG: 1 INJECTION, SOLUTION INTRAMUSCULAR; INTRAVENOUS at 14:38

## 2019-05-14 RX ADMIN — MIDAZOLAM HYDROCHLORIDE 1 MG: 1 INJECTION, SOLUTION INTRAMUSCULAR; INTRAVENOUS at 14:13

## 2019-05-14 RX ADMIN — ENOXAPARIN SODIUM 105 MG: 120 INJECTION SUBCUTANEOUS at 11:24

## 2019-05-14 RX ADMIN — METHYLPREDNISOLONE SODIUM SUCCINATE 40 MG: 40 INJECTION, POWDER, FOR SOLUTION INTRAMUSCULAR; INTRAVENOUS at 00:14

## 2019-05-14 RX ADMIN — AMIODARONE HYDROCHLORIDE 0.5 MG/MIN: 50 INJECTION, SOLUTION INTRAVENOUS at 08:25

## 2019-05-14 RX ADMIN — UMECLIDINIUM BROMIDE AND VILANTEROL TRIFENATATE 1 PUFF: 62.5; 25 POWDER RESPIRATORY (INHALATION) at 08:58

## 2019-05-14 RX ADMIN — MIDAZOLAM HYDROCHLORIDE 1 MG: 1 INJECTION, SOLUTION INTRAMUSCULAR; INTRAVENOUS at 14:07

## 2019-05-14 RX ADMIN — METHIMAZOLE 10 MG: 5 TABLET ORAL at 17:07

## 2019-05-14 RX ADMIN — MIDAZOLAM HYDROCHLORIDE 1 MG: 1 INJECTION, SOLUTION INTRAMUSCULAR; INTRAVENOUS at 14:34

## 2019-05-14 RX ADMIN — GUAIFENESIN 600 MG: 600 TABLET, EXTENDED RELEASE ORAL at 00:19

## 2019-05-14 RX ADMIN — AMIODARONE HYDROCHLORIDE 400 MG: 200 TABLET ORAL at 21:34

## 2019-05-14 RX ADMIN — LEVALBUTEROL HYDROCHLORIDE 1.25 MG: 1.25 SOLUTION RESPIRATORY (INHALATION) at 08:37

## 2019-05-14 RX ADMIN — FENTANYL CITRATE 25 MCG: 50 INJECTION, SOLUTION INTRAMUSCULAR; INTRAVENOUS at 14:29

## 2019-05-14 RX ADMIN — Medication 10 ML: at 21:34

## 2019-05-14 RX ADMIN — METHYLPREDNISOLONE SODIUM SUCCINATE 40 MG: 40 INJECTION, POWDER, FOR SOLUTION INTRAMUSCULAR; INTRAVENOUS at 06:40

## 2019-05-14 RX ADMIN — AMIODARONE HYDROCHLORIDE 150 MG: 50 INJECTION, SOLUTION INTRAVENOUS at 00:17

## 2019-05-14 RX ADMIN — APIXABAN 5 MG: 5 TABLET, FILM COATED ORAL at 21:34

## 2019-05-14 RX ADMIN — MIDAZOLAM HYDROCHLORIDE 2 MG: 1 INJECTION, SOLUTION INTRAMUSCULAR; INTRAVENOUS at 14:30

## 2019-05-14 RX ADMIN — MIDAZOLAM HYDROCHLORIDE 1 MG: 1 INJECTION, SOLUTION INTRAMUSCULAR; INTRAVENOUS at 14:11

## 2019-05-14 RX ADMIN — METHYLPREDNISOLONE SODIUM SUCCINATE 40 MG: 40 INJECTION, POWDER, FOR SOLUTION INTRAMUSCULAR; INTRAVENOUS at 11:25

## 2019-05-14 RX ADMIN — Medication 10 ML: at 00:14

## 2019-05-14 RX ADMIN — FENTANYL CITRATE 25 MCG: 50 INJECTION, SOLUTION INTRAMUSCULAR; INTRAVENOUS at 14:07

## 2019-05-14 RX ADMIN — AMIODARONE HYDROCHLORIDE 0.5 MG/MIN: 50 INJECTION, SOLUTION INTRAVENOUS at 06:39

## 2019-05-14 RX ADMIN — LIDOCAINE HYDROCHLORIDE 15 ML: 20 SOLUTION ORAL; TOPICAL at 14:05

## 2019-05-14 RX ADMIN — METHIMAZOLE 5 MG: 5 TABLET ORAL at 12:20

## 2019-05-14 RX ADMIN — AMIODARONE HYDROCHLORIDE 400 MG: 200 TABLET ORAL at 17:07

## 2019-05-14 RX ADMIN — ENOXAPARIN SODIUM 105 MG: 120 INJECTION SUBCUTANEOUS at 00:13

## 2019-05-14 NOTE — H&P
Hospitalist Admission NoteNAME: Jamel Rowan :  1955 MRN:  [id number] Date/Time:  2019 11:20 PM 
 
Patient PCP: Unknown, Provider 
______________________________________________________________________ Given the patient's current clinical presentation, I have a high level of concern for decompensation if discharged from the emergency department. Complex decision making was performed, which includes reviewing the patient's available past medical records, laboratory results, and x-ray films. My assessment of this patient's clinical condition and my plan of care is as follows. Assessment / Plan: 
Atrial fibrillation with rapid ventricular response Admit to PCU Started on cardizem drip, now on 15mg/hr drip but still HR remain 120s-150s I spoke to cardiology Dr Suresh Lacey, will given amiodarone 150mg bolus and start on the drip Will keep NPO from midnight in case require cardioversion in am 
Will check TSH and free t4 Check 2D echo Suspect flare related to COPD exacerbation Lovenox 1mg/kg Q12H S/p 1L fluid bolus in ED without any improvement in HR, he doesn't look dehydrated COPD exacerbation with recent bronchitis Still wheezing though claming breathing better Switch steroids to IV Scheduled Xopenex Continue Px inhalers Mucolytics and antitussives Continue doxycycline HTN (hypertension) Hold PO norvasc as on IV cardizem as above PRN nitropaste Hyperlipidemia Continue statins Code Status: full Surrogate Decision Maker: daughter DVT Prophylaxis: Lovenox as above Baseline: functional  
  
Subjective: CHIEF COMPLAINT: sent from urgent care due to rapid HR 
 
HISTORY OF PRESENT ILLNESS:    
Miachel Sicard is a 71 y.o.  male who presents to ED because referred from urgent care due to a.fib with RVR.  As per patient, he started to have non productive with for 10 days and was seen in urgent care and was started on abx and steroids. He was not feeling better so he went back and was started on doxycycline and switched to rach taper of steroids. He claims he started to feel better so came to urgent care today for work note to return to work where he was noted to have a.fib with RVR and referred to ED. Pt denies any fever, chills, nausea, vomiting, diarrhea, chest pain, problems urination. In ED pt noted to have HR in 105s-160s with a.fib with RVR and started on cardizem drip. We were asked to admit for work up and evaluation of the above problems. Past Medical History:  
Diagnosis Date  Cancer ([facility] [address] PROSTATE  Psychiatric disorder ANXIETY (SITUATIONAL) Past Surgical History:  
Procedure Laterality Date [address] UNLISTED UMBILICAL HERNIA  
 HX GI  2003  
 18 \" COLON RESECTION (DIVERTICULITIS)  HX UROLOGICAL CYSTO/BIOPSY PROSTATE Social History Tobacco Use  Smoking status: Current Every Day Smoker Packs/day: 1.00 Years: 47.00 Pack years: 47.00  Smokeless tobacco: Never Used Substance Use Topics  Alcohol use: Yes Alcohol/week: 3.0 oz Types: 6 Cans of beer per week Family History Problem Relation Age of Onset  Cancer Mother LUNG  
 Heart Disease Mother  Cancer Father PROSTATE  Diabetes Maternal Grandmother  Anesth Problems Neg Hx No Known Allergies Prior to Admission medications Medication Sig Start Date End Date Taking? Authorizing Provider  
rosuvastatin (CRESTOR) 5 mg tablet Take 5 mg by mouth daily. Yes Provider, Historical  
amLODIPine (NORVASC) 10 mg tablet Take 10 mg by mouth daily. Yes Provider, Historical  
umeclidinium-vilanterol (ANORO ELLIPTA) 62.5-25 mcg/actuation inhaler Take 1 Puff by inhalation daily. Yes Provider, Historical  
albuterol (PROAIR HFA) 90 mcg/actuation inhaler Take  by inhalation.    Yes Provider, Historical  
 doxycycline (MONODOX) 100 mg capsule Take 100 mg by mouth two (2) times a day. Yes Provider, Historical  
predniSONE (STERAPRED DS) 10 mg dose pack Take  by mouth See Admin Instructions. See administration instruction per 10mg dose pack   Yes Provider, Historical  
multivitamin (ONE A DAY) tablet Take 1 Tab by mouth daily. Yes Provider, Historical  
 
 
REVIEW OF SYSTEMS:    
I am not able to complete the review of systems because: The patient is intubated and sedated The patient has altered mental status due to his acute medical problems The patient has baseline aphasia from prior stroke(s) The patient has baseline dementia and is not reliable historian The patient is in acute medical distress and unable to provide information Total of 12 systems reviewed as follows:   
   POSITIVE= underlined text  Negative = text not underlined General:  fever, chills, sweats, generalized weakness, weight loss/gain,  
   loss of appetite Eyes:    blurred vision, eye pain, loss of vision, double vision ENT:    rhinorrhea, pharyngitis Respiratory:   cough, sputum production, SOB, GREGORY, wheezing, pleuritic pain  
Cardiology:   chest pain, palpitations, orthopnea, PND, edema, syncope Gastrointestinal:  abdominal pain , N/V, diarrhea, dysphagia, constipation, bleeding Genitourinary:  frequency, urgency, dysuria, hematuria, incontinence Muskuloskeletal :  arthralgia, myalgia, back pain Hematology:  easy bruising, nose or gum bleeding, lymphadenopathy Dermatological: rash, ulceration, pruritis, color change / jaundice Endocrine:   hot flashes or polydipsia Neurological:  headache, dizziness, confusion, focal weakness, paresthesia, Speech difficulties, memory loss, gait difficulty Psychological: Feelings of anxiety, depression, agitation Objective: VITALS:   
Visit Vitals /82 Pulse (!) 157 Temp 99.3 °F (37.4 °C) Resp 21 Ht 6' 4\" (1.93 m) Wt 105.6 kg (232 lb 12.9 oz) SpO2 95% BMI 28.34 kg/m² PHYSICAL EXAM: 
 
General:    Alert, cooperative, no distress, appears stated age. HEENT: Atraumatic, anicteric sclerae, pink conjunctivae No oral ulcers, mucosa moist, throat clear, dentition fair Neck:  Supple, symmetrical,  thyroid: non tender Lungs:   wheezing. No rales. Chest wall:  No tenderness  No Accessory muscle use. Heart:   Irregularly irregular rhythm with tachycardia,  No  murmur   No edema Abdomen:   Soft, non-tender. Not distended. Bowel sounds normal 
Extremities: No cyanosis. No clubbing,   
  Skin turgor normal, Capillary refill normal, Radial dial pulse 2+ Skin:     Not pale. Not Jaundiced  No rashes Psych:  Good insight. Not depressed. Not anxious or agitated. Neurologic: EOMs intact. No facial asymmetry. No aphasia or slurred speech. Symmetrical strength, Sensation grossly intact. Alert and oriented X 4.  
 
_______________________________________________________________________ Care Plan discussed with: 
  Comments Patient y Family RN y   
Care Manager Consultant:  melissa ED physician and cardiologist  
_______________________________________________________________________ Expected  Disposition:  
Home with Family y HH/PT/OT/RN   
SNF/LTC   
VEE   
________________________________________________________________________ TOTAL TIME: 60 Minutes Critical Care Provided   60  Minutes non procedure based Comments  
 y Reviewed previous records  
>50% of visit spent in counseling and coordination of care y Discussion with patient and family and questions answered 
  
 
________________________________________________________________________ Signed: Samantha Campbell MD 
 
Procedures: see electronic medical records for all procedures/Xrays and details which were not copied into this note but were reviewed prior to creation of Plan.  
 
LAB DATA REVIEWED:   
 Recent Results (from the past 24 hour(s)) CBC WITH AUTOMATED DIFF Collection Time: 05/13/19  8:44 PM  
Result Value Ref Range WBC 15.5 (H) 4.1 - 11.1 K/uL  
 RBC 5.23 4. 10 - 5.70 M/uL  
 HGB 16.9 12.1 - 17.0 g/dL HCT 48.5 36.6 - 50.3 % MCV 92.7 80.0 - 99.0 FL  
 MCH 32.3 26.0 - 34.0 PG  
 MCHC 34.8 30.0 - 36.5 g/dL  
 RDW 13.5 11.5 - 14.5 % PLATELET 311 961 - 385 K/uL MPV 10.2 8.9 - 12.9 FL  
 NRBC 0.0 0  WBC ABSOLUTE NRBC 0.00 0.00 - 0.01 K/uL NEUTROPHILS 80 (H) 32 - 75 % LYMPHOCYTES 10 (L) 12 - 49 % MONOCYTES 9 5 - 13 % EOSINOPHILS 0 0 - 7 % BASOPHILS 0 0 - 1 % IMMATURE GRANULOCYTES 1 (H) 0.0 - 0.5 % ABS. NEUTROPHILS 12.3 (H) 1.8 - 8.0 K/UL  
 ABS. LYMPHOCYTES 1.6 0.8 - 3.5 K/UL  
 ABS. MONOCYTES 1.4 (H) 0.0 - 1.0 K/UL  
 ABS. EOSINOPHILS 0.0 0.0 - 0.4 K/UL  
 ABS. BASOPHILS 0.1 0.0 - 0.1 K/UL  
 ABS. IMM. GRANS. 0.2 (H) 0.00 - 0.04 K/UL  
 DF AUTOMATED PROTHROMBIN TIME + INR Collection Time: 05/13/19  8:44 PM  
Result Value Ref Range INR 1.0 0.9 - 1.1 Prothrombin time 10.5 9.0 - 11.1 sec METABOLIC PANEL, COMPREHENSIVE Collection Time: 05/13/19  9:04 PM  
Result Value Ref Range Sodium 139 136 - 145 mmol/L Potassium 4.2 3.5 - 5.1 mmol/L Chloride 107 97 - 108 mmol/L  
 CO2 25 21 - 32 mmol/L Anion gap 7 5 - 15 mmol/L Glucose 168 (H) 65 - 100 mg/dL BUN 21 (H) 6 - 20 MG/DL Creatinine 0.90 0.70 - 1.30 MG/DL  
 BUN/Creatinine ratio 23 (H) 12 - 20 GFR est AA >60 >60 ml/min/1.73m2 GFR est non-AA >60 >60 ml/min/1.73m2 Calcium 8.4 (L) 8.5 - 10.1 MG/DL Bilirubin, total 0.5 0.2 - 1.0 MG/DL  
 ALT (SGPT) 68 12 - 78 U/L  
 AST (SGOT) 36 15 - 37 U/L Alk. phosphatase 119 (H) 45 - 117 U/L Protein, total 7.2 6.4 - 8.2 g/dL Albumin 3.5 3.5 - 5.0 g/dL Globulin 3.7 2.0 - 4.0 g/dL A-G Ratio 0.9 (L) 1.1 - 2.2    
TROPONIN I Collection Time: 05/13/19  9:04 PM  
Result Value Ref Range Troponin-I, Qt. <0.05 <0.05 ng/mL MAGNESIUM Collection Time: 05/13/19  9:04 PM  
Result Value Ref Range Magnesium 2.2 1.6 - 2.4 mg/dL TSH 3RD GENERATION Collection Time: 05/13/19  9:04 PM  
Result Value Ref Range TSH 0.04 (L) 0.36 - 3.74 uIU/mL

## 2019-05-14 NOTE — PROGRESS NOTES
Hospitalist Progress Note NAME: Tali Stevenpshire :  1955 MRN:  [id number] Assessment / Plan: 
Atrial fibrillation with rapid ventricular response s/p cardioversion on  
-appreciate Cardiology assistance 
-case discussed with Dr. Rosy Choudhary today 
-s/p amiodarone and cardizem gtt, PO amiodarone per Cardiology 
-treatment dose Lovenox has been transitioned to Eliquis 
-treat hyperthyroidism as below Hyperthyroidism: ?Thyroditis 
-Endocrine consulted, recommendations noted and appreciated 
-I start methimazole 5 mg daily and Dr. Pamela Ellis increased to 10 mg daily 
-await additional thyroid studies 
  
COPD exacerbation with recent bronchitis: patient on recent corticosteroids and this may be the reason for his Leukocytosis on admission 
-IV solumedrol changed to Prednisone 40 mg daily as wheezing has significantly improved today 
-continue scheduled Xopenex 
-continue Anoro 
-continue empiric Doxycycline 
  
HTN (hypertension)  
-continue to hold home antihypertensives and monitor 
-prn nitrobid 
  
Hyperlipidemia  
-Continue statin 
  
Habitual Alcohol Use: 
-CIWA ordered for completeness Overweight: Body mass index is 28.34 kg/m². Code Status: full Surrogate Decision Maker: daughter 
  
DVT Prophylaxis: Anticoagulation as above 
  
Baseline: functional  
 
Subjective: Chief Complaint / Reason for Physician Visit: follow-up afib with RVR and COPD exacerbation Patient seen for follow-up Feels some better Needs to void his bladder Review of Systems: 
Symptom Y/N Comments  Symptom Y/N Comments Fever/Chills n   Chest Pain n   
Poor Appetite    Edema Cough    Abdominal Pain n   
Sputum    Joint Pain SOB/GREGORY n   Pruritis/Rash Nausea/vomit    Tolerating PT/OT Diarrhea    Tolerating Diet  NPO for CV Constipation    Other Could NOT obtain due to:   
 
Objective: VITALS:  
Last 24hrs VS reviewed since prior progress note. Most recent are: Patient Vitals for the past 24 hrs: 
 Temp Pulse Resp BP SpO2  
05/14/19 1126 98.6 °F (37 °C) 90 19 133/78 90 % 05/14/19 0858 98.3 °F (36.8 °C) 89 18 150/70 95 % 05/14/19 0837     92 % 05/14/19 0640 97.5 °F (36.4 °C) 93 20 136/83 93 % 05/14/19 0316 98.5 °F (36.9 °C) 93 18 125/86 93 % 05/14/19 0125  (!) 110     
05/14/19 0045 97.6 °F (36.4 °C) (!) 122 20 121/83 93 % 05/14/19 0016  (!) 107 19 118/89 91 % 05/13/19 2100  (!) 157 21 167/82 95 % 05/13/19 2048  (!) 163  165/85   
05/13/19 2018 99.3 °F (37.4 °C) (!) 161 18 142/90 97 % Intake/Output Summary (Last 24 hours) at 5/14/2019 1157 Last data filed at 5/14/2019 1053 Gross per 24 hour Intake 844.59 ml Output  Net 844.59 ml PHYSICAL EXAM: 
General: WD, WN. Alert, cooperative, no acute distress   
EENT:  EOMI. Anicteric sclerae. MM dry Resp:  CTA bilaterally, no wheezing or rales. No accessory muscle use CV:  Irregular  Rhythm with tachycardia,  No edema GI:  Soft, Non distended, Non tender.  +Bowel sounds Neurologic:  Alert and oriented X 3, normal speech, Psych:   Good insight. Not anxious nor agitated Skin:  No rashes. No jaundice Reviewed most current lab test results and cultures  YES Reviewed most current radiology test results   YES Review and summation of old records today    NO Reviewed patient's current orders and MAR    YES 
PMH/SH reviewed - no change compared to H&P 
________________________________________________________________________ Care Plan discussed with: 
  Comments Patient x Family RN x Care Manager Consultant  x Dr. David Elizondo Multidiciplinary team rounds were held today with , nursing, pharmacist and clinical coordinator. Patient's plan of care was discussed; medications were reviewed and discharge planning was addressed. ________________________________________________________________________ Total NON critical care TIME: 35   Minutes Total CRITICAL CARE TIME Spent:   Minutes non procedure based Comments >50% of visit spent in counseling and coordination of care x   
________________________________________________________________________ Chandra Calvin MD  
 
Procedures: see electronic medical records for all procedures/Xrays and details which were not copied into this note but were reviewed prior to creation of Plan. LABS: 
I reviewed today's most current labs and imaging studies. Pertinent labs include: 
Recent Labs 05/14/19 0325 05/13/19 2044 WBC 15.7* 15.5* HGB 15.7 16.9 HCT 48.3 48.5  267 Recent Labs 05/14/19 0325 05/13/19 2104 05/13/19 2044  139  --   
K 4.0 4.2  --   
 107  --   
CO2 24 25  --   
* 168*  --   
BUN 18 21*  --   
CREA 0.80 0.90  --   
CA 8.5 8.4*  --   
MG  --  2.2  --   
ALB 3.5 3.5  --   
TBILI 1.0 0.5  --   
SGOT 38* 36  --   
ALT 68 68  --   
INR  --   --  1.0 Signed: Chandra Calvin MD

## 2019-05-14 NOTE — ED PROVIDER NOTES
EMERGENCY DEPARTMENT HISTORY AND PHYSICAL EXAM 
 
 
Date: 2019 Patient Name: Archie Hua History of Presenting Illness Chief Complaint Patient presents with  Irregular Heart Beat  
  reports that he went to [facility] for follow up from an Upper Respiratory Infection he has been fighting, and reports that he was sent to ED for further evaluation of heart rate (A fib in the 160's); pt reports that he has had several deaths in the family this week and he is having a hard time History Provided By: Patient HPI: Archie Hua, 71 y.o. male  presents to the ED with cc of rapid heart rate. Patient has a history of COPD. [facility] what he thought was an upper respiratory infection. He was found to have a heart rate in the 160s and A. fib on EKG. He has no cardiac history no history of atrial fibrillation. He denies any chest pain. He has no shortness of breath. No nausea vomiting or diarrhea. He has been under a lot of stress recently. His wife  just 2 days ago. There are no other complaints, changes, or physical findings at this time. PCP: Unknown, Provider No current facility-administered medications on file prior to encounter. Current Outpatient Medications on File Prior to Encounter Medication Sig Dispense Refill  multivitamin (ONE A DAY) tablet Take 1 Tab by mouth daily. Past History Past Medical History: 
Past Medical History:  
Diagnosis Date  Cancer ([facility] [address] PROSTATE  Psychiatric disorder ANXIETY (SITUATIONAL) Past Surgical History: 
Past Surgical History:  
Procedure Laterality Date   Street UNLISTED UMBILICAL HERNIA  
 HX GI    
 18 \" COLON RESECTION (DIVERTICULITIS)  HX UROLOGICAL CYSTO/BIOPSY PROSTATE Family History: 
Family History Problem Relation Age of Onset  Cancer Mother LUNG  
 Heart Disease Mother  Cancer Father PROSTATE  Diabetes Maternal Grandmother  Anesth Problems Neg Hx Social History: 
Social History Tobacco Use  Smoking status: Current Every Day Smoker Packs/day: 1.00 Years: 47.00 Pack years: 47.00  Smokeless tobacco: Never Used Substance Use Topics  Alcohol use: Yes Alcohol/week: 3.0 oz Types: 6 Cans of beer per week  Drug use: Yes Types: Marijuana Comment: TWICE IN PAST 6 MOS. Allergies: 
No Known Allergies Review of Systems Review of Systems Constitutional: Negative for chills and fever. HENT: Positive for congestion. Negative for ear pain, rhinorrhea, sore throat and trouble swallowing. Eyes: Negative for visual disturbance. Respiratory: Negative for cough, chest tightness and shortness of breath. Cardiovascular: Positive for palpitations. Negative for chest pain. Gastrointestinal: Negative for abdominal pain, blood in stool, constipation, diarrhea, nausea and vomiting. Genitourinary: Negative for decreased urine volume, difficulty urinating, dysuria and frequency. Musculoskeletal: Negative for back pain and neck pain. Skin: Negative for color change and rash. Neurological: Negative for dizziness, weakness, light-headedness and headaches. Physical Exam  
Physical Exam  
Constitutional: He is oriented to person, place, and time. He appears well-developed and well-nourished. He does not appear ill. No distress. HENT:  
Mouth/Throat: Oropharynx is clear and moist.  
Eyes: Conjunctivae are normal.  
Neck: Neck supple. Cardiovascular: An irregularly irregular rhythm present. Tachycardia present. Pulmonary/Chest: Effort normal and breath sounds normal. No accessory muscle usage. No respiratory distress. Abdominal: Soft. He exhibits no distension. There is no tenderness. Lymphadenopathy:  
  He has no cervical adenopathy. Neurological: He is alert and oriented to person, place, and time.  He has normal strength. No cranial nerve deficit or sensory deficit. Skin: Skin is warm and dry. Nursing note and vitals reviewed. Diagnostic Study Results Labs - Recent Results (from the past 24 hour(s)) CBC WITH AUTOMATED DIFF Collection Time: 05/13/19  8:44 PM  
Result Value Ref Range WBC 15.5 (H) 4.1 - 11.1 K/uL  
 RBC 5.23 4. 10 - 5.70 M/uL  
 HGB 16.9 12.1 - 17.0 g/dL HCT 48.5 36.6 - 50.3 % MCV 92.7 80.0 - 99.0 FL  
 MCH 32.3 26.0 - 34.0 PG  
 MCHC 34.8 30.0 - 36.5 g/dL  
 RDW 13.5 11.5 - 14.5 % PLATELET 541 911 - 582 K/uL MPV 10.2 8.9 - 12.9 FL  
 NRBC 0.0 0  WBC ABSOLUTE NRBC 0.00 0.00 - 0.01 K/uL NEUTROPHILS 80 (H) 32 - 75 % LYMPHOCYTES 10 (L) 12 - 49 % MONOCYTES 9 5 - 13 % EOSINOPHILS 0 0 - 7 % BASOPHILS 0 0 - 1 % IMMATURE GRANULOCYTES 1 (H) 0.0 - 0.5 % ABS. NEUTROPHILS 12.3 (H) 1.8 - 8.0 K/UL  
 ABS. LYMPHOCYTES 1.6 0.8 - 3.5 K/UL  
 ABS. MONOCYTES 1.4 (H) 0.0 - 1.0 K/UL  
 ABS. EOSINOPHILS 0.0 0.0 - 0.4 K/UL  
 ABS. BASOPHILS 0.1 0.0 - 0.1 K/UL  
 ABS. IMM. GRANS. 0.2 (H) 0.00 - 0.04 K/UL  
 DF AUTOMATED PROTHROMBIN TIME + INR Collection Time: 05/13/19  8:44 PM  
Result Value Ref Range INR 1.0 0.9 - 1.1 Prothrombin time 10.5 9.0 - 11.1 sec METABOLIC PANEL, COMPREHENSIVE Collection Time: 05/13/19  9:04 PM  
Result Value Ref Range Sodium 139 136 - 145 mmol/L Potassium 4.2 3.5 - 5.1 mmol/L Chloride 107 97 - 108 mmol/L  
 CO2 25 21 - 32 mmol/L Anion gap 7 5 - 15 mmol/L Glucose 168 (H) 65 - 100 mg/dL BUN 21 (H) 6 - 20 MG/DL Creatinine 0.90 0.70 - 1.30 MG/DL  
 BUN/Creatinine ratio 23 (H) 12 - 20 GFR est AA >60 >60 ml/min/1.73m2 GFR est non-AA >60 >60 ml/min/1.73m2 Calcium 8.4 (L) 8.5 - 10.1 MG/DL Bilirubin, total 0.5 0.2 - 1.0 MG/DL  
 ALT (SGPT) 68 12 - 78 U/L  
 AST (SGOT) 36 15 - 37 U/L Alk. phosphatase 119 (H) 45 - 117 U/L Protein, total 7.2 6.4 - 8.2 g/dL Albumin 3.5 3.5 - 5.0 g/dL Globulin 3.7 2.0 - 4.0 g/dL A-G Ratio 0.9 (L) 1.1 - 2.2    
TROPONIN I Collection Time: 05/13/19  9:04 PM  
Result Value Ref Range Troponin-I, Qt. <0.05 <0.05 ng/mL MAGNESIUM Collection Time: 05/13/19  9:04 PM  
Result Value Ref Range Magnesium 2.2 1.6 - 2.4 mg/dL TSH 3RD GENERATION Collection Time: 05/13/19  9:04 PM  
Result Value Ref Range TSH 0.04 (L) 0.36 - 3.74 uIU/mL Radiologic Studies -  
XR CHEST PORT Final Result IMPRESSION: No Acute Disease. CT Results  (Last 48 hours) None CXR Results  (Last 48 hours) 05/13/19 2115  XR CHEST PORT Final result Impression:  IMPRESSION: No Acute Disease. Narrative:  EXAM: Portable CXR. 2054 hours. INDICATION: a-fib, new onset FINDINGS:  
The lungs are clear. Heart is normal in size. There is no overt pulmonary edema. There is no evident pneumothorax, adenopathy or pleural effusion. Medical Decision Making I am the first provider for this patient. I reviewed the vital signs, available nursing notes, past medical history, past surgical history, family history and social history. Vital Signs-Reviewed the patient's vital signs. Patient Vitals for the past 24 hrs: 
 Temp Pulse Resp BP SpO2  
05/13/19 2100  (!) 157 21 167/82 95 % 05/13/19 2048  (!) 163  165/85   
05/13/19 2018 99.3 °F (37.4 °C) (!) 161 18 142/90 97 % Pulse Oximetry Analysis - 97% on RA Cardiac Monitor:  
Rate: 161 bpm 
Rhythm: Atrial Fibrillation EKG interpretation: (Preliminary) Rhythm: atrial fibrillation; and irregular. Rate (approx.): 161; Axis: normal; OH interval: normal; QRS interval: normal ; ST/T wave: non-specific changes. Records Reviewed: Nursing Notes and Old Medical Records Provider Notes (Medical Decision Making): [facility] with [facility] fibrillation rapid ventricular rate. No history of A. fib or coronary disease. He does have a history of COPD. He was started on diltiazem infusion titrated to response. Maxed out on the diltiazem and will try some beta-blocker. Will anticoagulate per cardiology recommendations. He has been under stress with the recent loss of his wife. His TSH is low and he likely has some hyperthyroidism. Cardiac enzymes are normal. 
 
ED Course:  
Initial assessment performed. The patients presenting problems have been discussed, and they are in agreement with the care plan formulated and outlined with them. I have encouraged them to ask questions as they arise throughout their visit. ED Course as of May 13 2229 Mon May 13, 2019  
2220 Spoke to Dr. Petar Kowalski, cardiology on call. For further rate control he recommends small doses of metoprolol IV like 2.5 mg. He would like for him to be anticoagulated and n.p.o. after midnight for cardioversion tomorrow.  
 [WM] ED Course User Index [WM] Atif Paris MD  
 
 
Orders Placed This Encounter  XR CHEST PORT  CBC WITH AUTOMATED DIFF  
 PROTHROMBIN TIME + INR  METABOLIC PANEL, COMPREHENSIVE  
 TROPONIN I  
 MAGNESIUM  
 TSH 3RD GENERATION  
 DIET CARDIAC Regular  NOTIFY PROVIDER: SPECIFY Notify provider on pt's arrival to floor ONE TIME STAT  
 OUT OF BED WITH ASSISTANCE  
[id number] [address]  FULL CODE  EKG, 12 LEAD, INITIAL  
 SALINE LOCK IV ONE TIME STAT  sodium chloride 0.9 % bolus infusion 1,000 mL  dilTIAZem (CARDIZEM) injection 10 mg  
 DISCONTD: dilTIAZem (CARDIZEM) 100 mg in dextrose 5% (MBP/ADV) 100 mL infusion  dilTIAZem (CARDIZEM) 125 mg in dextrose 5% 125 mL infusion  IP CONSULT TO HOSPITALIST  IP CONSULT TO CARDIOLOGY Critical Care Time:  
I have spent 30 minutes of critical care time in evaluating and treating this patient.   This includes time spent at bedside, time with family and decision makers, documentation, review of labs and imaging, and/or consultation with specialists. It does not include time spent on separately billed procedures. This patient presents with a critical illness or injury that acutely impairs one or more vital organ systems such that there is a high probability of imminent or life threatening deterioration in the patient's condition. This case involved decision making of high complexity to assess, manipulate, and support vital organ system failure and/or to prevent further life threatening deterioration of the patient's condition. Failure to initiate these interventions on an urgent basis would likely result in sudden, clinically significant or life threatening deterioration in the patient's condition. Abnormal findings supporting critical care: Atrial fibrillation with a rapid ventricular rate Interventions to support critical care: Diltiazem infusion Failure to intervene may result in: Cardiac ischemia, cardiac failure, death Disposition: 
Admit Diagnosis Clinical Impression: 1. Atrial fibrillation with RVR ([address] This note will not be viewable in [address]

## 2019-05-14 NOTE — ED NOTES
Patient presents ambulatory from [facility] where the found him to be in new onset A-fib. Per patient his wife  2 days ago. He has been suffering with bronchitis for about the past 10 days.

## 2019-05-14 NOTE — CONSULTS
Consult    Patient: Ariane Heath MRN: [id number]  SSN: [id number]    YOB: 1955  Age: 71 y.o. Sex: male      Subjective:      Ariane Heath is a 71 y.o. male who is being seen for hyperthyroidism. Pt reports that for that last two weeks he was having issues of cough and SOB. Pt has hx of COPD and has had bronchitis in the past. [facility] and [facility] and [facility]. He notes that after a week his symptoms did not improve so [facility] to [facility] and they gave him a different abx. Pt notes that he was beginning to feel better and then two days ago his wife . Yesterday he went to [facility] to get a note to clear him to return to work, because he was feeling much improved. He was found to be in Afib and was transferred to [facility] and admitted. He was started on an Amiodaron drip, which has improved his HR to the 100 range, but he is still in Afib. Pt denies issues of CP, palpitations, heat intolerance, tremors, diarrhea, dysphagia, dysphonia or chocking. Pt denies any personal or family hx of thyroid issues. In the ED he was found to have a TSH of 0.04, this was repeated this AM and was again low at 0.05, though his FT4 was only minimally elevated at 1.6.     Past Medical History:   Diagnosis Date    Cancer ([facility]     PROSTATE    Psychiatric disorder     ANXIETY (SITUATIONAL)     Past Surgical History:   Procedure Laterality Date    ABDOMEN SURGERY PROC UNLISTED      UMBILICAL HERNIA    HX GI      18 \" COLON RESECTION (DIVERTICULITIS)    HX UROLOGICAL      CYSTO/BIOPSY PROSTATE      Family History   Problem Relation Age of Onset    Cancer Mother         LUNG    Heart Disease Mother     Cancer Father         PROSTATE    Diabetes Maternal Grandmother     Anesth Problems Neg Hx      Social History     Tobacco Use    Smoking status: Current Every Day Smoker     Packs/day: 1.00     Years: 47.00     Pack years: 47.00    Smokeless tobacco: Never Used Substance Use Topics    Alcohol use:  Yes     Alcohol/week: 3.0 oz     Types: 6 Cans of beer per week      Current Facility-Administered Medications   Medication Dose Route Frequency Provider Last Rate Last Dose    methIMAzole (TAPAZOLE) tablet 5 mg  5 mg Oral DAILY Trish Crockett MD   5 mg at 05/14/19 1220    dilTIAZem (CARDIZEM) 125 mg in dextrose 5% 125 mL infusion  0-15 mg/hr IntraVENous TITRATE Pawan Calzada MD 15 mL/hr at 05/14/19 0906 15 mg/hr at 05/14/19 0906    enoxaparin (LOVENOX) injection 105 mg  105 mg SubCUTAneous Q12H William Duke MD   105 mg at 05/14/19 1124    methylPREDNISolone (PF) (SOLU-MEDROL) injection 40 mg  40 mg IntraVENous Q6H Pawan Calzada MD   40 mg at 05/14/19 1125    levalbuterol (Noemy Mount) nebulizer soln 1.25 mg/3 mL  1.25 mg Nebulization Q6H RT Pawan Calzada MD   1.25 mg at 05/14/19 0837    guaiFENesin ER (MUCINEX) tablet 600 mg  600 mg Oral Q12H Pawan Calzaad MD   600 mg at 05/14/19 0857    benzonatate (TESSALON) capsule 200 mg  200 mg Oral TID PRN Pawan Calzada MD   200 mg at 05/14/19 0019    sodium chloride (NS) flush 5-40 mL  5-40 mL IntraVENous Q8H Pawan Calzada MD   10 mL at 05/14/19 0640    sodium chloride (NS) flush 5-40 mL  5-40 mL IntraVENous PRN Pawan Calzada MD        acetaminophen (TYLENOL) tablet 650 mg  650 mg Oral Q6H PRN Pawan Calzada MD        ondansetron (ZOFRAN) injection 4 mg  4 mg IntraVENous Q4H PRN Pawan Calzada MD        nitroglycerin (NITROBID) 2 % ointment 1 Inch  1 Inch Topical Q6H PRN William Duke MD        doxycycline (VIBRA-TABS) tablet 100 mg  100 mg Oral BID Pawan Calzada MD   100 mg at 05/14/19 0857    rosuvastatin (CRESTOR) tablet 5 mg  5 mg Oral DAILY William Duke MD   5 mg at 05/14/19 0857    umeclidinium-vilanterol (ANORO ELLIPTA) 62.5 mcg- 25 mcg/inhalation  1 Puff Inhalation DAILY Pawan Calzada MD   1 Puff at 05/14/19 0858    amiodarone (CORDARONE) 375 mg/250 mL D5W infusion  0.5 mg/min IntraVENous CONTINUOUS Marielena Bingham MD 20 mL/hr at 05/14/19 0825 0.5 mg/min at 05/14/19 0825        No Known Allergies    Review of Systems:  A comprehensive review of systems was negative except for that written in the History of Present Illness. Objective:     Vitals:    05/14/19 0640 05/14/19 0837 05/14/19 0858 05/14/19 1126   BP: 136/83  150/70 133/78   Pulse: 93  89 90   Resp: 20 18 19   Temp: 97.5 °F (36.4 °C)  98.3 °F (36.8 °C) 98.6 °F (37 °C)   SpO2: 93% 92% 95% 90%   Weight:       Height:            Physical Exam:  GENERAL: alert, cooperative, no distress, appears stated age  EYE: negative  LYMPHATIC: Cervical, supraclavicular, and axillary nodes normal.   THROAT & NECK: No thyromegally, no LAD, no thyroid nodules  LUNG: wheezes heard throughout  HEART: irregularly irregular rhythm  ABDOMEN: soft, non-tender. Bowel sounds normal. No masses,  no organomegaly  EXTREMITIES:  extremities normal, atraumatic, no cyanosis or edema  SKIN: Normal.  NEUROLOGIC: DTR 2+, + tremor, Str 5/5 x4    Recent Results (from the past 24 hour(s))   EKG, 12 LEAD, INITIAL    Collection Time: 05/13/19  8:26 PM   Result Value Ref Range    Ventricular Rate 161 BPM    Atrial Rate 127 BPM    QRS Duration 88 ms    Q-T Interval 268 ms    QTC Calculation (Bezet) 438 ms    Calculated R Axis 65 degrees    Calculated T Axis -79 degrees    Diagnosis       Atrial fibrillation with rapid ventricular response  Septal infarct , age undetermined  ST & T wave abnormality, consider inferolateral ischemia  When compared with ECG of 19-JUL-2018 14:24,  Atrial fibrillation has replaced Sinus rhythm  Vent. rate has increased BY  88 BPM  ST now depressed in Inferior leads      Confirmed by Nory Zarate ([id number]) on 5/14/2019 8:54:37 AM     CBC WITH AUTOMATED DIFF    Collection Time: 05/13/19  8:44 PM   Result Value Ref Range    WBC 15.5 (H) 4.1 - 11.1 K/uL    RBC 5.23 4. 10 - 5.70 M/uL    HGB 16.9 12.1 - 17.0 g/dL    HCT 48.5 36.6 - 50.3 %    MCV 92.7 80.0 - 99.0 FL    MCH 32.3 26.0 - 34.0 PG    MCHC 34.8 30.0 - 36.5 g/dL    RDW 13.5 11.5 - 14.5 %    PLATELET 073 809 - 394 K/uL    MPV 10.2 8.9 - 12.9 FL    NRBC 0.0 0  WBC    ABSOLUTE NRBC 0.00 0.00 - 0.01 K/uL    NEUTROPHILS 80 (H) 32 - 75 %    LYMPHOCYTES 10 (L) 12 - 49 %    MONOCYTES 9 5 - 13 %    EOSINOPHILS 0 0 - 7 %    BASOPHILS 0 0 - 1 %    IMMATURE GRANULOCYTES 1 (H) 0.0 - 0.5 %    ABS. NEUTROPHILS 12.3 (H) 1.8 - 8.0 K/UL    ABS. LYMPHOCYTES 1.6 0.8 - 3.5 K/UL    ABS. MONOCYTES 1.4 (H) 0.0 - 1.0 K/UL    ABS. EOSINOPHILS 0.0 0.0 - 0.4 K/UL    ABS. BASOPHILS 0.1 0.0 - 0.1 K/UL    ABS. IMM. GRANS. 0.2 (H) 0.00 - 0.04 K/UL    DF AUTOMATED     PROTHROMBIN TIME + INR    Collection Time: 05/13/19  8:44 PM   Result Value Ref Range    INR 1.0 0.9 - 1.1      Prothrombin time 10.5 9.0 - 80.6 sec   METABOLIC PANEL, COMPREHENSIVE    Collection Time: 05/13/19  9:04 PM   Result Value Ref Range    Sodium 139 136 - 145 mmol/L    Potassium 4.2 3.5 - 5.1 mmol/L    Chloride 107 97 - 108 mmol/L    CO2 25 21 - 32 mmol/L    Anion gap 7 5 - 15 mmol/L    Glucose 168 (H) 65 - 100 mg/dL    BUN 21 (H) 6 - 20 MG/DL    Creatinine 0.90 0.70 - 1.30 MG/DL    BUN/Creatinine ratio 23 (H) 12 - 20      GFR est AA >60 >60 ml/min/1.73m2    GFR est non-AA >60 >60 ml/min/1.73m2    Calcium 8.4 (L) 8.5 - 10.1 MG/DL    Bilirubin, total 0.5 0.2 - 1.0 MG/DL    ALT (SGPT) 68 12 - 78 U/L    AST (SGOT) 36 15 - 37 U/L    Alk.  phosphatase 119 (H) 45 - 117 U/L    Protein, total 7.2 6.4 - 8.2 g/dL    Albumin 3.5 3.5 - 5.0 g/dL    Globulin 3.7 2.0 - 4.0 g/dL    A-G Ratio 0.9 (L) 1.1 - 2.2     TROPONIN I    Collection Time: 05/13/19  9:04 PM   Result Value Ref Range    Troponin-I, Qt. <0.05 <0.05 ng/mL   MAGNESIUM    Collection Time: 05/13/19  9:04 PM   Result Value Ref Range    Magnesium 2.2 1.6 - 2.4 mg/dL   TSH 3RD GENERATION    Collection Time: 05/13/19  9:04 PM   Result Value Ref Range    TSH 0.04 (L) 0.36 - 3.74 uIU/mL   TSH 3RD GENERATION    Collection Time: 05/14/19 3:25 AM   Result Value Ref Range    TSH 0.05 (L) 0.36 - 1.35 uIU/mL   METABOLIC PANEL, COMPREHENSIVE    Collection Time: 05/14/19  3:25 AM   Result Value Ref Range    Sodium 138 136 - 145 mmol/L    Potassium 4.0 3.5 - 5.1 mmol/L    Chloride 108 97 - 108 mmol/L    CO2 24 21 - 32 mmol/L    Anion gap 6 5 - 15 mmol/L    Glucose 162 (H) 65 - 100 mg/dL    BUN 18 6 - 20 MG/DL    Creatinine 0.80 0.70 - 1.30 MG/DL    BUN/Creatinine ratio 23 (H) 12 - 20      GFR est AA >60 >60 ml/min/1.73m2    GFR est non-AA >60 >60 ml/min/1.73m2    Calcium 8.5 8.5 - 10.1 MG/DL    Bilirubin, total 1.0 0.2 - 1.0 MG/DL    ALT (SGPT) 68 12 - 78 U/L    AST (SGOT) 38 (H) 15 - 37 U/L    Alk. phosphatase 107 45 - 117 U/L    Protein, total 7.0 6.4 - 8.2 g/dL    Albumin 3.5 3.5 - 5.0 g/dL    Globulin 3.5 2.0 - 4.0 g/dL    A-G Ratio 1.0 (L) 1.1 - 2.2     CBC WITH AUTOMATED DIFF    Collection Time: 05/14/19  3:25 AM   Result Value Ref Range    WBC 15.7 (H) 4.1 - 11.1 K/uL    RBC 5.05 4. 10 - 5.70 M/uL    HGB 15.7 12.1 - 17.0 g/dL    HCT 48.3 36.6 - 50.3 %    MCV 95.6 80.0 - 99.0 FL    MCH 31.1 26.0 - 34.0 PG    MCHC 32.5 30.0 - 36.5 g/dL    RDW 13.7 11.5 - 14.5 %    PLATELET 044 610 - 817 K/uL    MPV 10.1 8.9 - 12.9 FL    NRBC 0.0 0  WBC    ABSOLUTE NRBC 0.00 0.00 - 0.01 K/uL    NEUTROPHILS 88 (H) 32 - 75 %    LYMPHOCYTES 6 (L) 12 - 49 %    MONOCYTES 5 5 - 13 %    EOSINOPHILS 0 0 - 7 %    BASOPHILS 0 0 - 1 %    IMMATURE GRANULOCYTES 1 (H) 0.0 - 0.5 %    ABS. NEUTROPHILS 13.7 (H) 1.8 - 8.0 K/UL    ABS. LYMPHOCYTES 1.0 0.8 - 3.5 K/UL    ABS. MONOCYTES 0.8 0.0 - 1.0 K/UL    ABS. EOSINOPHILS 0.0 0.0 - 0.4 K/UL    ABS. BASOPHILS 0.0 0.0 - 0.1 K/UL    ABS. IMM.  GRANS. 0.2 (H) 0.00 - 0.04 K/UL    DF AUTOMATED     T4, FREE    Collection Time: 05/14/19  3:25 AM   Result Value Ref Range    T4, Free 1.6 (H) 0.8 - 1.5 NG/DL       Assessment:     Hospital Problems  Date Reviewed: 5/13/2019          Codes Class Noted POA    [facility] response [facility] ICD-10-CM: I48.91  ICD-9-CM: 427.31  5/13/2019 Unknown        COPD exacerbation ([facility] [address] ICD-10-CM: J44.1  ICD-9-CM: 491.21  5/13/2019 Unknown        HTN (hypertension) ICD-10-CM: I10  ICD-9-CM: 401.9  5/13/2019 Unknown        Hyperlipidemia ICD-10-CM: E78.5  ICD-9-CM: 272.4  5/13/2019 Unknown              Plan:     1) Hyperthyroidism > With his recent illness, it is likely that pt could have experienced a thyroiditis episode leading to the hyperthyroidism. He does not have the exophthalmos or thyromegaly of classic Grave's hyperthyroidism and I did not palpate any thyroid nodules, to suggest Toxic MNG. If he did have an episode of thyroiditis, his hyperthyroidism should resolve on it's own. However, since he is experiencing tachycardia and Afib I do feel that he should be treated with Methimazole, particularly since he is receiving amiodarone, which does contain iodine. I will increase his dose of MMI to 10mg daily, with an extra now dose. Will order TSI and TRAB to look for evidence of grave's antibodies. His TT3 and Thyroid antibody panel are still pending. Will continue to follow.     Signed By: Dary Samuel MD     May 14, 2019

## 2019-05-14 NOTE — CONSULTS
[address] [place] [address] [address] [facility]     Date of  Admission: 5/13/2019  8:25 PM     Admission type:Emergency    Consult for: afib with RVR  Consult by: Hospitalist     Subjective:     Levi Denny is a 71 y.o. male admitted for Atrial fibrillation with rapid ventricular response ([facility] [I48.91]. Patient has no previous cardiac hx. Admitted with c/o SOB x 2 weeks, recent bronchitis and had been on antibx and steroids. [facility], and noted to be in afib with RVR. Sent to ED. Started on amiodarone and diltiazem for rate control, remains in afib. Patient denies CP, palpitations, dizziness/lightheadedness,     ECG afib with RVR, troponin neg, NTproBNP. Cardiac riskfactors: smoking/ tobacco exposure, obesity, male gender, hypertension. Patient Active Problem List    Diagnosis Date Noted    Atrial fibrillation with rapid ventricular response ([facility] 05/13/2019    COPD exacerbation ([facility] 05/13/2019    HTN (hypertension) 05/13/2019    Hyperlipidemia 05/13/2019    Prostate cancer ([facility] [address] 02/17/2014      Unknown, Provider  Past Medical History:   Diagnosis Date    [facility] ([facility]     PROSTATE    Psychiatric disorder     ANXIETY (SITUATIONAL)      Social History     Socioeconomic History    Marital status:      Spouse name: Not on file    Number of children: Not on file    Years of education: Not on file    Highest education level: Not on file   Tobacco Use    Smoking status: Current Every Day Smoker     Packs/day: 1.00     Years: 47.00     Pack years: 47.00    Smokeless tobacco: Never Used   Substance and Sexual Activity    Alcohol use: Yes     Alcohol/week: 3.0 oz     Types: 6 Cans of beer per week    Drug use: Yes     Types: Marijuana     Comment: TWICE IN PAST 6 MOS.     Sexual activity: Not Currently     No Known Allergies   Family History   Problem Relation Age of Onset    Cancer Mother         LUNG    Heart Disease Mother     Cancer Father         PROSTATE    Diabetes Maternal Grandmother     Anesth Problems Neg Hx       Current Facility-Administered Medications   Medication Dose Route Frequency    dilTIAZem (CARDIZEM) 125 mg in dextrose 5% 125 mL infusion  0-15 mg/hr IntraVENous TITRATE    enoxaparin (LOVENOX) injection 105 mg  105 mg SubCUTAneous Q12H    methylPREDNISolone (PF) (SOLU-MEDROL) injection 40 mg  40 mg IntraVENous Q6H    levalbuterol (XOPENEX) nebulizer soln 1.25 mg/3 mL  1.25 mg Nebulization Q6H RT    guaiFENesin ER (MUCINEX) tablet 600 mg  600 mg Oral Q12H    benzonatate (TESSALON) capsule 200 mg  200 mg Oral TID PRN    sodium chloride (NS) flush 5-40 mL  5-40 mL IntraVENous Q8H    sodium chloride (NS) flush 5-40 mL  5-40 mL IntraVENous PRN    acetaminophen (TYLENOL) tablet 650 mg  650 mg Oral Q6H PRN    ondansetron (ZOFRAN) injection 4 mg  4 mg IntraVENous Q4H PRN    nitroglycerin (NITROBID) 2 % ointment 1 Inch  1 Inch Topical Q6H PRN    doxycycline (VIBRA-TABS) tablet 100 mg  100 mg Oral BID    rosuvastatin (CRESTOR) tablet 5 mg  5 mg Oral DAILY    umeclidinium-vilanterol (ANORO ELLIPTA) 62.5 mcg- 25 mcg/inhalation  1 Puff Inhalation DAILY    amiodarone (CORDARONE) 375 mg/250 mL D5W infusion  0.5 mg/min IntraVENous CONTINUOUS        Review of Symptoms:   11 systems reviewed, negative other than as stated in the HPI        Objective:      Visit Vitals  /70 (BP 1 Location: Left arm, BP Patient Position: At rest)   Pulse 89   Temp 98.3 °F (36.8 °C)   Resp 18   Ht 6' 4\" (1.93 m)   Wt 105.6 kg (232 lb 12.9 oz)   SpO2 95%   BMI 28.34 kg/m²       Physical:   General: obese  male in no acute distress  Heart: irr, irr no m/S3/JVD, no carotid bruits   Lungs: ant rhonchi  Abdomen: Soft, +BS, NTND   Extremities: LE terrance +DP/PT, no edema   Neurologic: Grossly normal    Data Review:   Recent Labs     05/14/19  0325 05/13/19 2044   WBC 15.7* 15.5* HGB 15.7 16.9   HCT 48.3 48.5    267     Recent Labs     05/14/19  0325 05/13/19  2104 05/13/19 2044    139  --    K 4.0 4.2  --     107  --    CO2 24 25  --    * 168*  --    BUN 18 21*  --    CREA 0.80 0.90  --    CA 8.5 8.4*  --    MG  --  2.2  --    ALB 3.5 3.5  --    TBILI 1.0 0.5  --    SGOT 38* 36  --    ALT 68 68  --    INR  --   --  1.0       Recent Labs     05/13/19 2104   Nahum Sport <0.05         Intake/Output Summary (Last 24 hours) at 5/14/2019 0925  Last data filed at 5/14/2019 2064  Gross per 24 hour   Intake 375 ml   Output    Net 375 ml        Cardiographics    Telemetry: afib with RVR  ECG:  afib with RVR   Echocardiogram: MIRIAM pending    CXRAY: no acute process       Assessment:       Active Problems:    Atrial fibrillation with rapid ventricular response ([facility] (5/13/2019)      COPD exacerbation ([facility] (5/13/2019)      HTN (hypertension) (5/13/2019)      Hyperlipidemia (5/13/2019)         Plan:     Afib with RVR:  Rate remains uncontrolled on IV Dilt and amio, BP tolerating  Plan for MIRIAM and cardioversion today, Dr. Anika Ruiz and I discussed risk and benefits of procedure with patient, agrees to proceed. Chads 2 vasc score: 1.   Post cardioversion will need anticoagulation for at least 1 month    Thank you for consulting ADINA Segundo NP

## 2019-05-14 NOTE — PROGRESS NOTES
TRANSFER - OUT REPORT: 
 
Verbal report given to Rubina CORONA(name) on Jose Guadalupe Henley  being transferred to [facility](unit) for routine progression of care (post MIRIAM and Cardioversion) Report consisted of patients Situation, Background, Assessment and  
Recommendations(SBAR). Information from the following report(s) SBAR, Procedure Summary, MAR and Cardiac Rhythm NSR was reviewed with the receiving nurse. Lines:  
Peripheral IV 05/14/19 Right Arm (Active) Site Assessment Clean, dry, & intact 5/14/2019 11:34 AM  
Phlebitis Assessment 0 5/14/2019 11:34 AM  
Infiltration Assessment 0 5/14/2019 11:34 AM  
Dressing Status Clean, dry, & intact 5/14/2019 11:34 AM  
Dressing Type Transparent;Tape 5/14/2019 11:34 AM  
Hub Color/Line Status Pink; Infusing 5/14/2019 11:34 AM  
Action Taken Blood drawn 5/14/2019  8:54 AM  
   
Peripheral IV 05/14/19 Right Wrist (Active) Site Assessment Clean, dry, & intact 5/14/2019 11:34 AM  
Phlebitis Assessment 0 5/14/2019 11:34 AM  
Infiltration Assessment 0 5/14/2019 11:34 AM  
Dressing Status Clean, dry, & intact 5/14/2019 11:34 AM  
Dressing Type Transparent;Tape 5/14/2019 11:34 AM  
Hub Color/Line Status Blue; Infusing 5/14/2019 11:34 AM  
  
 
Opportunity for questions and clarification was provided. Patient transported with: 
 Monitor O2 @ 2 liters Registered Nurse

## 2019-05-14 NOTE — TELEPHONE ENCOUNTER
Caleb Feliciano, with [facility], called in a hospital consult for this patient. She can be reached at:  [phone].       Room # [id number]  Seen For:   Hyperthyroidism with Afib with RBR  Referring Doctor:   Dr. Ivelisse Segundo

## 2019-05-14 NOTE — PROGRESS NOTES
Successful MIRIAM and cardioversion to SR. Stop IV Diltiazem, and IV amiodarone Start PO amiodarone 400mg TID x 9 doses, then decrease dose to 200mg BID Stop Lovenox, start Eliquis 5m g BID 
 
PTA patient was taking amlodipine for HTN 
BP currently lower post MIRIAM and cardioversion and sedation Re-evaluate in AM, likely to start low dose Diltiazem

## 2019-05-14 NOTE — PROGRESS NOTES
Bedside shift change report given to Randal De Oliveira (oncoming nurse) by [facility] (offgoing nurse). Report included the following information SBAR.  
 
1365 - Pt's IV infiltrated, started 2 new peripheral IV's in the right arm. Pt does not qualify for a bed alarm, states he will use call bell to notify nurse before getting out of bed.  
 
0932 - Per cardiology, pt to remain NPO.  
 
Usama Mata to give lovenox dose before MIRIAM and cardioversion per Aj Wang NP 
 
[address] report from [address], RN after pt's MIRIAM and cardioversion. RN will clarify with Dr. Lydia Huerta and NP about amiodarone drip and cardizem drip orders.

## 2019-05-14 NOTE — PROGRESS NOTES
Bedside shift change report given to [facility], [facility] (oncoming nurse). Report included the following information SBAR. SHIFT SUMMARY: 
 
 
CONCERNS TO ADDRESS WITH MD: 
 
 
 
[facility] NURSING NOTE Admission Date 5/13/2019 Admission Diagnosis Atrial fibrillation with rapid ventricular response ([address] [I48.91] Consults IP CONSULT TO CARDIOLOGY 
IP CONSULT TO ENDOCRINOLOGY Cardiac Monitoring [x] Yes [] No  
  
Purposeful Hourly Rounding [x] Yes   
Reema Score Total Score: 2 Reema score 3 or > [x] Bed Alarm [] Avasys [] 1:1 sitter [] Patient refused (Signed refusal form in chart) Reid Score Reid Score: 20 Reid score 14 or < [] PMT consult [] Wound Care consult  
 []  Specialty bed  [] Nutrition consult Influenza Vaccine Received Flu Vaccine for Current Season (usually Sept-March): Yes Oxygen needs? [x] Room air Oxygen @  []1L    []2L    []3L   []4L    []5L   []6L via NC Chronic home O2 use? [] Yes [x] No 
Perform O2 challenge test and document in progress note using smartphrase (.Homeoxygen) Last bowel movement Last Bowel Movement Date: 05/13/19 Urinary Catheter LDAs Peripheral IV 05/14/19 Right Arm (Active) Site Assessment Clean, dry, & intact 5/14/2019 11:34 AM  
Phlebitis Assessment 0 5/14/2019 11:34 AM  
Infiltration Assessment 0 5/14/2019 11:34 AM  
Dressing Status Clean, dry, & intact 5/14/2019 11:34 AM  
Dressing Type Transparent;Tape 5/14/2019 11:34 AM  
Hub Color/Line Status Pink; Infusing 5/14/2019 11:34 AM  
Action Taken Blood drawn 5/14/2019  8:54 AM  
   
Peripheral IV 05/14/19 Right Wrist (Active) Site Assessment Clean, dry, & intact 5/14/2019 11:34 AM  
Phlebitis Assessment 0 5/14/2019 11:34 AM  
Infiltration Assessment 0 5/14/2019 11:34 AM  
Dressing Status Clean, dry, & intact 5/14/2019 11:34 AM  
Dressing Type Transparent;Tape 5/14/2019 11:34 AM  
Hub Color/Line Status Blue; Infusing 5/14/2019 11:34 AM  
                  
  
 Readmission Risk Assessment Tool Score Low Risk 3 Total Score 3 Charlson Comorbidity Score (Age + Comorbid Conditions) Criteria that do not apply:  
 Has Seen PCP in Last 6 Months (Yes=3, No=0) . Living with Significant Other. Assisted Living. LTAC. SNF. or  
Rehab Patient Length of Stay (>5 days = 3) IP Visits Last 12 Months (1-3=4, 4=9, >4=11) Pt. Coverage (Medicare=5 , Medicaid, or Self-Pay=4) Expected Length of Stay 2d 12h Actual Length of Stay 1

## 2019-05-14 NOTE — PROGRESS NOTES
Problem: Falls - Risk of 
Goal: *Absence of Falls Description Document Marina Frausto Fall Risk and appropriate interventions in the flowsheet. Outcome: Progressing Towards Goal 
Note:  
Fall Risk Interventions: 
  
 
  
 
Medication Interventions: Assess postural VS orthostatic hypotension, Evaluate medications/consider consulting pharmacy, Patient to call before getting OOB, Teach patient to arise slowly History of Falls Interventions: Consult care management for discharge planning

## 2019-05-14 NOTE — PROGRESS NOTES
Pt sedated with 5mg Versed and 75mcg Fentanyl for MIRIAM Pt sedated with an additional 5mg Versed and 25mcg Fentanyl, given 1 synchronized shock(s) at 360 Joules, Afib converted to NSR.(monitored sedation from 1407 to 1444) EKG obtained

## 2019-05-14 NOTE — ED NOTES
TRANSFER - OUT REPORT: 
 
Verbal report given to [facility], RN (name) on Skyler Davenport  being transferred to [facility] (unit) for routine progression of care Report consisted of patients Situation, Background, Assessment and  
Recommendations(SBAR). Information from the following report(s) SBAR, Kardex and Recent Results was reviewed with the receiving nurse. Lines:  
Peripheral IV 05/13/19 Left Forearm (Active) Site Assessment Clean, dry, & intact 5/13/2019  8:48 PM  
Phlebitis Assessment 0 5/13/2019  8:48 PM  
Infiltration Assessment 0 5/13/2019  8:48 PM  
Dressing Status Clean, dry, & intact 5/13/2019  8:48 PM  
Hub Color/Line Status Green 5/13/2019  8:48 PM  
  
 
Opportunity for questions and clarification was provided. Patient transported with: 
 Registered Nurse

## 2019-05-15 VITALS
HEIGHT: 76 IN | BODY MASS INDEX: 28.35 KG/M2 | HEART RATE: 63 BPM | DIASTOLIC BLOOD PRESSURE: 73 MMHG | RESPIRATION RATE: 20 BRPM | SYSTOLIC BLOOD PRESSURE: 146 MMHG | TEMPERATURE: 97.9 F | OXYGEN SATURATION: 93 % | WEIGHT: 232.81 LBS

## 2019-05-15 PROBLEM — E05.90 HYPERTHYROIDISM: Status: ACTIVE | Noted: 2019-05-15

## 2019-05-15 LAB
ALBUMIN SERPL-MCNC: 3.3 G/DL (ref 3.5–5)
ALBUMIN/GLOB SERPL: 0.9 {RATIO} (ref 1.1–2.2)
ALP SERPL-CCNC: 90 U/L (ref 45–117)
ALT SERPL-CCNC: 54 U/L (ref 12–78)
ANION GAP SERPL CALC-SCNC: 8 MMOL/L (ref 5–15)
AST SERPL-CCNC: 22 U/L (ref 15–37)
ATRIAL RATE: 67 BPM
BASOPHILS # BLD: 0 K/UL (ref 0–0.1)
BASOPHILS NFR BLD: 0 % (ref 0–1)
BILIRUB SERPL-MCNC: 0.5 MG/DL (ref 0.2–1)
BUN SERPL-MCNC: 20 MG/DL (ref 6–20)
BUN/CREAT SERPL: 22 (ref 12–20)
CALCIUM SERPL-MCNC: 8.9 MG/DL (ref 8.5–10.1)
CALCULATED P AXIS, ECG09: 14 DEGREES
CALCULATED R AXIS, ECG10: 61 DEGREES
CALCULATED T AXIS, ECG11: 18 DEGREES
CHLORIDE SERPL-SCNC: 107 MMOL/L (ref 97–108)
CO2 SERPL-SCNC: 25 MMOL/L (ref 21–32)
CREAT SERPL-MCNC: 0.89 MG/DL (ref 0.7–1.3)
DIAGNOSIS, 93000: NORMAL
DIFFERENTIAL METHOD BLD: ABNORMAL
EOSINOPHIL # BLD: 0 K/UL (ref 0–0.4)
EOSINOPHIL NFR BLD: 0 % (ref 0–7)
ERYTHROCYTE [DISTWIDTH] IN BLOOD BY AUTOMATED COUNT: 13.4 % (ref 11.5–14.5)
GLOBULIN SER CALC-MCNC: 3.5 G/DL (ref 2–4)
GLUCOSE SERPL-MCNC: 171 MG/DL (ref 65–100)
HCT VFR BLD AUTO: 46.1 % (ref 36.6–50.3)
HGB BLD-MCNC: 15.6 G/DL (ref 12.1–17)
IMM GRANULOCYTES # BLD AUTO: 0.3 K/UL (ref 0–0.04)
IMM GRANULOCYTES NFR BLD AUTO: 1 % (ref 0–0.5)
LYMPHOCYTES # BLD: 1 K/UL (ref 0.8–3.5)
LYMPHOCYTES NFR BLD: 5 % (ref 12–49)
MCH RBC QN AUTO: 32 PG (ref 26–34)
MCHC RBC AUTO-ENTMCNC: 33.8 G/DL (ref 30–36.5)
MCV RBC AUTO: 94.7 FL (ref 80–99)
MONOCYTES # BLD: 0.8 K/UL (ref 0–1)
MONOCYTES NFR BLD: 4 % (ref 5–13)
NEUTS SEG # BLD: 17.9 K/UL (ref 1.8–8)
NEUTS SEG NFR BLD: 90 % (ref 32–75)
NRBC # BLD: 0 K/UL (ref 0–0.01)
NRBC BLD-RTO: 0 PER 100 WBC
P-R INTERVAL, ECG05: 140 MS
PLATELET # BLD AUTO: 212 K/UL (ref 150–400)
PMV BLD AUTO: 10.2 FL (ref 8.9–12.9)
POTASSIUM SERPL-SCNC: 4.1 MMOL/L (ref 3.5–5.1)
PROT SERPL-MCNC: 6.8 G/DL (ref 6.4–8.2)
Q-T INTERVAL, ECG07: 402 MS
QRS DURATION, ECG06: 96 MS
QTC CALCULATION (BEZET), ECG08: 424 MS
RBC # BLD AUTO: 4.87 M/UL (ref 4.1–5.7)
SODIUM SERPL-SCNC: 140 MMOL/L (ref 136–145)
T3 SERPL-MCNC: 199 NG/DL (ref 71–180)
THYROGLOB AB SERPL-ACNC: <1 IU/ML (ref 0–0.9)
THYROPEROXIDASE AB SERPL-ACNC: 10 IU/ML (ref 0–34)
VENTRICULAR RATE, ECG03: 67 BPM
WBC # BLD AUTO: 20.1 K/UL (ref 4.1–11.1)

## 2019-05-15 PROCEDURE — 80053 COMPREHEN METABOLIC PANEL: CPT

## 2019-05-15 PROCEDURE — 94640 AIRWAY INHALATION TREATMENT: CPT

## 2019-05-15 PROCEDURE — 74011250637 HC RX REV CODE- 250/637: Performed by: INTERNAL MEDICINE

## 2019-05-15 PROCEDURE — 74011000250 HC RX REV CODE- 250: Performed by: INTERNAL MEDICINE

## 2019-05-15 PROCEDURE — 74011636637 HC RX REV CODE- 636/637: Performed by: INTERNAL MEDICINE

## 2019-05-15 PROCEDURE — 36415 COLL VENOUS BLD VENIPUNCTURE: CPT

## 2019-05-15 PROCEDURE — 85025 COMPLETE CBC W/AUTO DIFF WBC: CPT

## 2019-05-15 PROCEDURE — 74011250637 HC RX REV CODE- 250/637: Performed by: NURSE PRACTITIONER

## 2019-05-15 RX ORDER — AMIODARONE HYDROCHLORIDE 200 MG/1
200 TABLET ORAL 2 TIMES DAILY
Qty: 60 TAB | Refills: 0 | Status: SHIPPED | OUTPATIENT
Start: 2019-05-17

## 2019-05-15 RX ORDER — AMIODARONE HYDROCHLORIDE 200 MG/1
200 TABLET ORAL 2 TIMES DAILY
Qty: 60 TAB | Refills: 0 | Status: SHIPPED | OUTPATIENT
Start: 2019-05-17 | End: 2019-05-15

## 2019-05-15 RX ORDER — METHIMAZOLE 10 MG/1
10 TABLET ORAL DAILY
Qty: 30 TAB | Refills: 0 | Status: SHIPPED | OUTPATIENT
Start: 2019-05-15 | End: 2019-05-15

## 2019-05-15 RX ORDER — AMIODARONE HYDROCHLORIDE 400 MG/1
400 TABLET ORAL 3 TIMES DAILY
Qty: 6 TAB | Refills: 0 | Status: SHIPPED | OUTPATIENT
Start: 2019-05-15 | End: 2019-05-15

## 2019-05-15 RX ORDER — METHIMAZOLE 10 MG/1
10 TABLET ORAL DAILY
Qty: 30 TAB | Refills: 0 | Status: SHIPPED | OUTPATIENT
Start: 2019-05-15

## 2019-05-15 RX ORDER — AMIODARONE HYDROCHLORIDE 400 MG/1
400 TABLET ORAL 3 TIMES DAILY
Qty: 6 TAB | Refills: 0 | Status: SHIPPED | OUTPATIENT
Start: 2019-05-15 | End: 2019-05-17

## 2019-05-15 RX ORDER — LEVALBUTEROL INHALATION SOLUTION 1.25 MG/3ML
1.25 SOLUTION RESPIRATORY (INHALATION)
Status: DISCONTINUED | OUTPATIENT
Start: 2019-05-15 | End: 2019-05-15 | Stop reason: HOSPADM

## 2019-05-15 RX ADMIN — Medication 10 ML: at 07:00

## 2019-05-15 RX ADMIN — APIXABAN 5 MG: 5 TABLET, FILM COATED ORAL at 09:42

## 2019-05-15 RX ADMIN — METHIMAZOLE 10 MG: 5 TABLET ORAL at 09:43

## 2019-05-15 RX ADMIN — AMIODARONE HYDROCHLORIDE 400 MG: 200 TABLET ORAL at 09:41

## 2019-05-15 RX ADMIN — DOXYCYCLINE HYCLATE 100 MG: 100 TABLET, COATED ORAL at 09:42

## 2019-05-15 RX ADMIN — UMECLIDINIUM BROMIDE AND VILANTEROL TRIFENATATE 1 PUFF: 62.5; 25 POWDER RESPIRATORY (INHALATION) at 10:34

## 2019-05-15 RX ADMIN — ROSUVASTATIN CALCIUM 5 MG: 10 TABLET, FILM COATED ORAL at 10:33

## 2019-05-15 RX ADMIN — GUAIFENESIN 600 MG: 600 TABLET, EXTENDED RELEASE ORAL at 09:43

## 2019-05-15 RX ADMIN — PREDNISONE 40 MG: 20 TABLET ORAL at 09:43

## 2019-05-15 RX ADMIN — LEVALBUTEROL HYDROCHLORIDE 1.25 MG: 1.25 SOLUTION RESPIRATORY (INHALATION) at 07:49

## 2019-05-15 NOTE — PROGRESS NOTES
[address]  [phone] Cardiology Progress Note 5/15/2019 8:38 AM 
 
Admit Date: 5/13/2019 Admit Diagnosis:  
Atrial fibrillation with rapid ventricular response ([address] [I48.91] Subjective:  
 
Erick Floyd has no c/o SOB, CP. Successful [address] yesterday, remains in SR. Visit Vitals /73 (BP 1 Location: Left arm, BP Patient Position: At rest) Pulse 63 Temp 97.9 °F (36.6 °C) Resp 20 Ht 6' 4\" (1.93 m) Wt 105.6 kg (232 lb 12.9 oz) SpO2 93% BMI 28.34 kg/m² Current Facility-Administered Medications Medication Dose Route Frequency  levalbuterol (XOPENEX) nebulizer soln 1.25 mg/3 mL  1.25 mg Nebulization Q6HWA RT  
 levalbuterol (XOPENEX) nebulizer soln 1.25 mg/3 mL  1.25 mg Nebulization Q6H PRN  
 methIMAzole (TAPAZOLE) tablet 10 mg  10 mg Oral DAILY  amiodarone (CORDARONE) tablet 400 mg  400 mg Oral TID  apixaban (ELIQUIS) tablet 5 mg  5 mg Oral Q12H  
 [START ON 5/17/2019] amiodarone (CORDARONE) tablet 200 mg  200 mg Oral BID  LORazepam (ATIVAN) injection 1 mg  1 mg IntraVENous Q1H PRN  
 LORazepam (ATIVAN) injection 2 mg  2 mg IntraVENous Q1H PRN  predniSONE (DELTASONE) tablet 40 mg  40 mg Oral DAILY WITH BREAKFAST  guaiFENesin ER (MUCINEX) tablet 600 mg  600 mg Oral Q12H  
 benzonatate (TESSALON) capsule 200 mg  200 mg Oral TID PRN  
 sodium chloride (NS) flush 5-40 mL  5-40 mL IntraVENous Q8H  
 sodium chloride (NS) flush 5-40 mL  5-40 mL IntraVENous PRN  
 acetaminophen (TYLENOL) tablet 650 mg  650 mg Oral Q6H PRN  
 ondansetron (ZOFRAN) injection 4 mg  4 mg IntraVENous Q4H PRN  
 nitroglycerin (NITROBID) 2 % ointment 1 Inch  1 Inch Topical Q6H PRN  
 doxycycline (VIBRA-TABS) tablet 100 mg  100 mg Oral BID  rosuvastatin (CRESTOR) tablet 5 mg  5 mg Oral DAILY  umeclidinium-vilanterol (ANORO ELLIPTA) 62.5 mcg- 25 mcg/inhalation  1 Puff Inhalation DAILY Objective: Physical Exam: 
General Appearance:  slightly obese  male in no acute distress Chest:   wheezine Cardiovascular:  Regular rate and rhythm, no murmur. Abdomen:   Soft, non-tender, bowel sounds are active. Extremities: no peripheral edema Skin:  Warm and dry. Data Review:  
Recent Labs 05/15/19 
9041 05/14/19 
0325 05/13/19 
2044 WBC 20.1* 15.7* 15.5* HGB 15.6 15.7 16.9 HCT 46.1 48.3 48.5  209 267 Recent Labs 05/15/19 
7147 05/14/19 
3214 05/13/19 
2104 05/13/19 
2044  138 139  --   
K 4.1 4.0 4.2  --   
 108 107  --   
CO2 25 24 25  --   
* 162* 168*  --   
BUN 20 18 21*  --   
CREA 0.89 0.80 0.90  --   
CA 8.9 8.5 8.4*  --   
MG  --   --  2.2  --   
ALB 3.3* 3.5 3.5  --   
TBILI 0.5 1.0 0.5  --   
SGOT 22 38* 36  --   
ALT 54 68 68  --   
INR  --   --   --  1.0 Recent Labs 05/13/19 2104 TROIQ <0.05 Intake/Output Summary (Last 24 hours) at 5/15/2019 8824 Last data filed at 5/14/2019 2327 Gross per 24 hour Intake 1249.59 ml Output  Net 1249.59 ml Telemetry: SR  
 
Echo 5/14/19 · Left Ventricle: Estimated left ventricular ejection fraction is 56 - 60%. · There is no thrombus within the left atrial appendage. · Aortic Valve: Mild aortic valve leaflet calcification present without reduced excursion. · Mitral Valve: Trace mitral valve regurgitation. · Successful direct-current cardioversion of atrial fibrillation to NSR Assessment:  
 
Active Problems: 
  Atrial fibrillation with rapid ventricular response ([address] (5/13/2019) COPD exacerbation ([address] (5/13/2019) HTN (hypertension) (5/13/2019) Hyperlipidemia (5/13/2019) Plan: Afib with RVR: 
Successful MIRIAM and cardioversion, remains in SR. Continue on amiodarone 400mg TID x 9 doses, at discharge can decrease to 200mg BID Continue on Eliquis 5mg BID Chads 2 vasc score: 1.   Post cardioversion will need anticoagulation for at least 1 month HTN: 
BP controlled, had been on amlodipine PTA If BP elevated and HR stable, can start Dilt CD for control F/u with Dr. Leopoldo Drone 2 weeks. [facility] Cardiology

## 2019-05-15 NOTE — TELEPHONE ENCOUNTER
Spoke with Dr. Love Butts. Pt was cardioverted last night and has been in NSR since. Cardiology has transitioned him to PO Amiodarone and pt is ready for D/C home. I recommended he go home on MMI 10mg daily and I will see him back in 2-3 weeks.

## 2019-05-15 NOTE — PROGRESS NOTES
No complaints from patient this am. He is discharged. DC information and medication sheets given. Discussed heart healthy diet. Discussed risk for bleeding with blood thinner. Patient has follow up appts .

## 2019-05-15 NOTE — TELEPHONE ENCOUNTER
----- Message from Terri Koehler sent at 5/15/2019  8:54 AM EDT -----  Regarding[facility]. Would appreciate a call back on his cell [phone].

## 2019-05-15 NOTE — DISCHARGE SUMMARY
Hospitalist Discharge Summary     Patient ID:  Socorro Cuevas  [id number]  71 y.o.  1955 5/13/2019    PCP on record: Unknown, Provider    Admit date: 5/13/2019  Discharge date and time: 5/15/2019    DISCHARGE DIAGNOSIS:  Atrial fibrillation with rapid ventricular response ([address] (5/13/2019)      Overview: 5/14/19 MIRIAM and successful cardioversion to SR  COPD exacerbation ([facility] (5/13/2019)  HTN (hypertension) (5/13/2019)  Hyperlipidemia (5/13/2019)  Hyperthyroidism (5/15/2019)    CONSULTATIONS:  IP CONSULT TO CARDIOLOGY  IP CONSULT TO ENDOCRINOLOGY    See HPI from H&P of Angie Paris MD:    ______________________________________________________________________  DISCHARGE SUMMARY/HOSPITAL COURSE:  for full details see H&P, daily progress notes, labs, consult notes.      Hospital course via problem below:    Atrial fibrillation with rapid ventricular response s/p cardioversion on 5/14  -appreciate Cardiology assistance, s/p Cardioversion on 5/14 and was in NSR at time of discharge  -case discussed with Cardiology today  -s/p amiodarone and cardizem gtt, PO amiodarone per Cardiology, see dosing instructions below  -treatment dose Lovenox has been transitioned to Eliquis on 5/14, continue Eliquis, Rx assistance coupon provided by Cardiology  -treat hyperthyroidism as below     Hyperthyroidism: ?Thyroditis  -Endocrine consulted, recommendations noted and appreciated; case discussed with Dr. Spencer Marinelli this am, will need to follow-up with Endocrine in 2-3 weeks, Dr. Jefferson Marie office to schedule appointment and call patient   -discharged on Methimazole 10 mg daily    COPD exacerbation with recent bronchitis: patient on recent corticosteroids and this may be the reason for his Leukocytosis on admission  -Continue Prednisone on discharge, patient had left over from prior Rx (7 days) thus new Rx not given  -continue Anoro  -continue empiric Doxycycline, patient counseled on side effects of potential esophageal irritation as well as photosensitivity  -outpatient Pulmonary follow-up recommended  -Tobacco cessation recommended     HTN (hypertension)   -see antihypertensive regimen as below     Hyperlipidemia   -Continue statin     Habitual Alcohol Use:  -CIWA ordered for completeness; no signs of withdrawal during stay     Overweight: Body mass index is 28.34 kg/m².      _______________________________________________________________________  Patient seen and examined by me on discharge day. Pertinent Findings:  Gen:    Not in distress  Chest: Clear lungs  CVS:   Regular rhythm. No edema  Abd:  Soft, not distended, not tender  Neuro:  Alert  _______________________________________________________________________  DISCHARGE MEDICATIONS:   Current Discharge Medication List      START taking these medications    Details   !! amiodarone (PACERONE) 400 mg tablet Take 1 Tab by mouth three (3) times daily for 6 doses. Qty: 6 Tab, Refills: 0      !! amiodarone (CORDARONE) 200 mg tablet Take 1 Tab by mouth two (2) times a day. Start on Friday evening (5/17/19)  Qty: 60 Tab, Refills: 0      apixaban (ELIQUIS) 5 mg tablet Take 1 Tab by mouth every twelve (12) hours. Qty: 60 Tab, Refills: 0      methIMAzole (TAPAZOLE) 10 mg tablet Take 1 Tab by mouth daily. Qty: 30 Tab, Refills: 0      OTHER Please excuse Mr. Meng Abarca from work on 5/13/19 through 5/15/19 as he was in the hospital with an acute illness. He should also be excused on 5/16/19 to allow for further recovery from his illness. He may return to work on 5/17/19 without restriction. Qty: 1 Each, Refills: 0       !! - Potential duplicate medications found. Please discuss with provider. CONTINUE these medications which have NOT CHANGED    Details   rosuvastatin (CRESTOR) 5 mg tablet Take 5 mg by mouth daily. amLODIPine (NORVASC) 10 mg tablet Take 10 mg by mouth daily.       umeclidinium-vilanterol (ANORO ELLIPTA) 62.5-25 mcg/actuation inhaler Take 1 Puff by inhalation daily. albuterol (PROAIR HFA) 90 mcg/actuation inhaler Take  by inhalation. doxycycline (MONODOX) 100 mg capsule Take 100 mg by mouth two (2) times a day. predniSONE (STERAPRED DS) 10 mg dose pack Take  by mouth See Admin Instructions. See administration instruction per 10mg dose pack      multivitamin (ONE A DAY) tablet Take 1 Tab by mouth daily. Patient Follow Up Instructions:      Follow-up Information     Follow up With Specialties Details Why Contact Info    Imani Madison NP Nurse Practitioner On 5/30/2019 at 9:15AM at [facility]       Les Torres MD Endocrinology   [address]  [facility] 1  [address] [facility]      Salena Mcmullen MD Pulmonary Disease  call for new patient appointment for COPD [address]  Pulmonary [place] [phone]                  ________________________________________________________________    Risk of deterioration: Low    Condition at Discharge:  Stable  __________________________________________________________________    Disposition  Home with family, no needs  Case discussed with Patient, RN, Cardiology and Endocrine  ____________________________________________________________________    Code Status: Full Code  ___________________________________________________________________      Total time in minutes spent coordinating this discharge (includes going over instructions, follow-up, prescriptions, and preparing report for sign off to her PCP) :  40 minutes    Signed:  Nick Boo MD

## 2019-05-15 NOTE — PROGRESS NOTES
Problem: Falls - Risk of 
Goal: *Absence of Falls Description Document Abhijit Miles Fall Risk and appropriate interventions in the flowsheet. Outcome: Progressing Towards Goal 
  
Problem: Patient Education: Go to Patient Education Activity Goal: Patient/Family Education Outcome: Progressing Towards Goal

## 2019-05-15 NOTE — PROGRESS NOTES
Reason for Admission:   Atrial fibrillation with rapid ventricular response RRAT Score:  3 Plan for utilizing home health:   No    
                 
Current Advanced Directive/Advance Care Plan: Pt is FULL code status. Pt has ACP on file. Likelihood of Readmission:  Moderate risk Transition of Care Plan:   
 
CM met with pt to discuss d/c plan. CM verified pt's demographics, insurance and PCP. Pt is a 72 y/o  male admitted to [address] [place] [address] on 5/13/19 for atrial fibrillation with rapid ventricular response. Pt did not have a PCP lised. Pt stated that he goes to [facility] for medical needs. Pt mentioned that he use to see Dr. Jose Tomas. Pt uses [facility] pharmacy on [facility] or [place] for Rx. Pt resides with his daughter and ex-wife in a one level home with [address]. Pt does drive. Pt has a supportive family. Pt is independent with ADL's and IADL's. Pt has a nebulizer and cane. Pt has not had home health, acute inpatient rehab nor has he been in a skilled nursing facility. Pt is FULL code status. Pt has ACP on file. Pt's ex-wife Kaye Mcclendon ([phone]) will transport at d/c. 
 
CM discussed with pt about [facility] to Home due to COPD diagnosis. Pt stated that he was interested. 10:30am- CM submitted referral to [facility] for hospital to home visit. CM called Dr. Jose Tomas office to schedule appt. Office stated that pt has not been seen in 3 years and that Dr. Jayson Heredia is not able to accept pt. CM provided pt with list of physicians. Pt chose the [facility]. 10:45am-CM called to schedule new patient appointment. Appointment schedule for August 2, 2019 at 11:00am with Dr. Sindy Patterson. 11:00am- [facility] health unable to accept referral due to pt not having an established PCP.  
 
11:15am- CM met with pt about d/c.  Pt stated that his ex-wife will be transporting him at d/c. CM informed pt's nurse about d/c. Care Management Interventions PCP Verified by CM: No(Pt did not have a PCP listed. Pt stated that he uses [facility]. Pt mentioned that he use to see Dr. Chance Ritter. ) Mode of Transport at Discharge: (Pt's ex-wife Camacho Sandoval will transport at d/c) Transition of Care Consult (CM Consult): Discharge Planning Discharge Durable Medical Equipment: No(Pt has a nebulizer and cane.) Physical Therapy Consult: No 
Occupational Therapy Consult: No 
Speech Therapy Consult: No 
Current Support Network: Relative's Home(Pt resides with his daughter and ex-wife in a one level home with 3 BRANDIE. ) Confirm Follow Up Transport: Self(Pt does drive. Pt has supportive family and transports when necessary. ) Plan discussed with Pt/Family/Caregiver: Yes Discharge Location Discharge Placement: Home(Home with follow-up appts. ) Dax Mcdaniel [address] [place] [address] 
[phone]

## 2019-05-15 NOTE — PROGRESS NOTES
ADULT PROTOCOL: JET AEROSOL  REASSESSMENT Patient  Meseret Armstrong     71 y.o.   male     5/15/2019  1:02 AM 
 
Breath Sounds Pre Procedure: Right Breath Sounds: Diminished Left Breath Sounds: Diminished Breath Sounds Post Procedure: Right Breath Sounds: Diminished Left Breath Sounds: Diminished Breathing pattern: Pre procedure Breathing Pattern: Regular Post procedure Breathing Pattern: Regular Heart Rate: Pre procedure Pulse: 74 Post procedure Pulse: 76 Resp Rate: Pre procedure Respirations: 18 Post procedure Respirations: 18 Peak Flow: Pre bronchodilator   n/a Post bronchodilator   n/a Incentive Spirometry:   n/a 
    n/a Cough: Pre procedure Cough: Non-productive Post procedure Cough: Non-productive Sputum: Pre procedure  n/a Post procedure   n/a Oxygen: O2 Device: Room air   FiO2 (%) room air Changed: NO SpO2: Pre procedure SpO2: 94 %   without oxygen Post procedure SpO2: 96 %  without oxygen Nebulizer Therapy: Current medications Aerosolized Medications: Xopenex Changed: YES Problem List:  
Patient Active Problem List  
Diagnosis Code  Prostate cancer ([facility] C61  Atrial fibrillation with rapid ventricular response ([facility]) I48.91  
 COPD exacerbation ([facility] J44.1  HTN (hypertension) I10  
 Hyperlipidemia E78.5 Respiratory Therapist: [place] RT

## 2019-05-15 NOTE — DISCHARGE INSTRUCTIONS
Patient Education        Deciding Between Electrical Cardioversion and Rate Control Medicines for Atrial Fibrillation  How can you decide between electrical cardioversion and rate control medicines for atrial fibrillation? What is atrial fibrillation? Atrial fibrillation (say \"AY-tree-ramesh trh-jzka-TMB-shun\") is a kind of uneven heartbeat. It can make you feel lightheaded and dizzy. You may feel weak. It also can make you more likely to have a stroke. Electrical cardioversion can return your heart to a normal rhythm. First you'll get medicines to make you sleepy and control pain. Then your doctor will use patches to send an electric current to your heart. This resets the rhythm of your heart. Not everyone with atrial fibrillation needs this treatment. For some people, taking medicines may be better. Most people can live with an uneven heartbeat. It just has to be kept under control so the heart does not beat too fast.  Use this information to help you and your doctor decide which treatment to choose for atrial fibrillation. What are quinones points about this decision? · Electrical cardioversion can return your heart to a normal rhythm. But the problem can come back. The longer you have had atrial fibrillation, the more likely it is to come back after this treatment. · Cardioversion may not work as well when an uneven heartbeat is caused by another heart disease, such as heart failure. · If your symptoms bother you a lot, you may want to try cardioversion. But even if it works, you may still need to take blood thinners to prevent a stroke. · If you don't have symptoms, or if they don't bother you much, you can try medicines to slow your heart rate. And you can take blood thinners to prevent a stroke. · Cardioversion does have risks, such as stroke. Discuss the risks with your doctor. Make sure you understand them. · You may have more than one heart problem.  Cardioversion doesn't work as well if you have more than one heart problem. Why might you choose electrical cardioversion? · It restores the normal heart rhythm for most people. · The idea of having an electric shock does not bother you. · Your symptoms bother you a lot. · You have had atrial fibrillation just one time. · You do not have other heart problems. · You may not have to take as many medicines. Or you may not need to take them as long. Why might you choose rate-control medicines? · These medicines keep many people from having symptoms. · You prefer to take medicines rather than have an electric shock. · Your symptoms don't bother you much. · If these medicines don't work, you can still try electrical cardioversion. Your decision  Thinking about the facts and your feelings can help you make a decision that is right for you. Be sure you understand the benefits and risks of your options. And think about what else you need to do before you make the decision. Where can you learn more? Go to http://nnamdi-ezequiel.info/. Enter Z869 in the search box to learn more about \"Deciding Between Electrical Cardioversion and Rate Control Medicines for Atrial Fibrillation. \"  Current as of: July 22, 2018  Content Version: 11.9  © 1124-9507 [employer]. Care instructions adapted under license by [employer] (which disclaims liability or warranty for this information). If you have questions about a medical condition or this instruction, always ask your healthcare professional. [place] [address] any warranty or liability for your use of this information. [address] [place] [address]  [phone]  MIRIAM and Cardioversion Discharge Instructions      Admission Diagnoses:   Atrial fibrillation with rapid ventricular response [facility]) [I48.91]    Discharge Diagnoses:    Active Problems:    Atrial fibrillation with rapid ventricular response (HCC) (5/13/2019)      Overview: 5/14/19 MIRIAM and successful cardioversion to SR      COPD exacerbation ([facility] [address] (5/13/2019)      HTN (hypertension) (5/13/2019)      Hyperlipidemia (5/13/2019)      Hyperthyroidism (5/15/2019)      Cardiology Procedures this Admission:  MIRIAM/Cardioversion    Disposition: home        MIRIAM and Cardioversion: After Your Visit     Your Care Instructions    After your MIRIAM and cardioversion, do not eat or drink for 2 hours. You have received a mild anesthesia and numbing medicine in your throat. After these 2 hours, start with sips of water. If you are able to swallow water easily, try something soft (bananna, applesauce). If your throat feels normal, you may resume your regular diet. Do not drive for the rest of the day or as instructed by the physician. Notify your physician if you have any problems after the test: swelling of your tongue or throat, shortness of breath or coughing up blood. Cardioversion is a treatment for an abnormal heartbeat, such as atrial fibrillation. In cardioversion, a brief electric shock is given to the heart to reset its rhythm. The shock comes through metal paddles or patches placed on the outside of your chest. Cardioversion usually restores the heartbeat to normal.    After the procedure, you may have redness, like a sunburn, where the paddles were. Do not drive until the day after a cardioversion. You can eat and drink when you feel ready to. Your doctor may have you take medicines daily to help the heart beat normally and to prevent blood clots. Your doctor may prescribe medicine before and after cardioversion to help keep your heart in a normal rhythm after the procedure. Instead of electric cardioversion, your doctor may try to convert your heart to a normal rhythm using medicine given through a vein. This is usually done in a clinic or hospital.    Follow-up care is a key part of your treatment and safety.  Be sure to make and go to all appointments, and call your doctor if you are having problems. Its also a good idea to know your test results and keep a list of the medicines you take. How can you care for yourself at home? Medicines  Take your medicines exactly as prescribed. Call your doctor if you think you are having a problem with your medicine. You may take some of the following medicines:  Beta-blockers such as propranolol (Inderal), metoprolol (Lopressor), or carvedilol (Coreg). Calcium channel blockers such as diltiazem (Cardizem) or verapamil (Calan). Digoxin (Lanoxin). Antiarrhythmic medicines such as amiodarone (Cordarone), procainamide, or flecainide (Tambocor). You will get more details on the specific medicines your doctor prescribes. If your doctor has given you blood thinners such as warfarin (Coumadin), enoxaparin (Lovenox), or aspirin to prevent blood clots, be sure to: Take your medicine at the same time each day. Talk with your doctor before you make any major changes to your diet or start a weight loss plan. The foods that you eat can affect how well blood thinners work for you. Tell your dentist, pharmacist, and other health professionals that you take blood thinners. Watch for unusual bruising or bleeding, such as blood in urine, maroon or black stools, or bleeding from the nose or gums. Get regular blood tests to check how fast your blood clots, if you are taking Coumadin. Wear medical alert jewelry that says you take blood thinners. You can buy this at most drugstores. Do not take any vitamins, over-the-counter medicines, or herbal products without talking to your doctor first.    Lifestyle changes  Do not smoke. Smoking increases your risk of stroke and heart attack. If you need help quitting, talk to your doctor about stop-smoking programs and medicines. These can increase your chances of quitting for good. Limit alcohol to 2 drinks a day for men and 1 drink a day for women.  Alcohol may interfere with blood thinners. It also increases your risk of falls, which can cause bruising and bleeding. Limit or avoid caffeine (coffee, tea, and cola drinks) and other stimulants (decongestants, over-the-counter cold and flu medicines, and illegal substances) if your doctor says to. They can trigger heart problems. If you are taking blood thinners, avoid sports or activities that could lead to injury. Make your home safe and take other measures to reduce your risk of falling. Always wear a seat belt while in a car. Activity  If your doctor recommends it, get more exercise. Walking is a good choice. Bit by bit, increase the amount you walk every day. Try for 30 minutes on most days of the week. You also may want to swim, bike, or do other activities. When you exercise, watch for signs that your heart is working too hard. You are pushing too hard if you cannot talk while you are exercising. If you become short of breath or dizzy or have chest pain, sit down and rest immediately. If your doctor has not set you up with a cardiac rehabilitation program, talk to him or her about whether that is right for you. Cardiac rehab includes supervised exercise, help with diet and lifestyle changes, and emotional support. It may reduce your risk of future heart problems. Check your pulse regularly. Place two fingers on the artery at the palm side of your wrist in line with your thumb. If your heartbeat seems uneven or fast, talk to your doctor. When should you call for help? Call 911 anytime you think you may need emergency care. For example, call if:  You have severe trouble breathing. You cough up pink, foamy mucus and you have trouble breathing. You have symptoms of a heart attack such as:  Chest pain or pressure. Sweating. Shortness of breath. Nausea or vomiting. Pain that spreads from the chest to the neck, jaw, or one or both shoulders or arms. Dizziness or lightheadedness. A fast or uneven pulse.   After calling 911, chew 1 adult-strength aspirin. Wait for an ambulance. Do not try to drive yourself. You have signs of a stroke. These may include:  Sudden numbness, paralysis, or weakness in your face, arm, or leg, especially on only one side of your body. New problems with walking or balance. Sudden vision changes. Drooling or slurred speech. New problems speaking or understanding simple statements, or feeling confused. A sudden, severe headache that is different from past headaches. You cough up blood. You vomit blood or what looks like coffee grounds. You pass maroon or very bloody stools. You passed out (lost consciousness). Call your doctor now or seek immediate medical care if:  You have new or increased shortness of breath. You are dizzy or lightheaded, or you feel like you may faint. You have sudden weight gain, such as 3 pounds or more in 2 to 3 days. You have increased swelling in your legs, ankles, or feet. You have new bruises or blood spots under your skin. You have a nosebleed. Your gums bleed when you brush your teeth. You have blood in your urine. Your stools are black and tarlike or have streaks of blood. Watch closely for changes in your health, and be sure to contact your doctor if you have any problems. Where can you learn more? Go to [employer]alHamster. Enter G150 in the search box to learn more about \"Cardioversion: After Your Visit. \"    © 0600-2246 [employer]. Care instructions adapted under license by [facility] (which disclaims liability or warranty for this information). This care instruction is for use with your licensed healthcare professional. If you have questions about a medical condition or this instruction, always ask your healthcare professional. Nap[place] any warranty or liability for your use of this information.       HOSPITALIST DISCHARGE INSTRUCTIONS    NAME: Brittanie Nolasco   :  1955 MRN:  [id number]     Date/Time:  5/15/2019 9:01 AM    ADMIT DATE: 5/13/2019     DISCHARGE DATE: 5/15/2019     DISCHARGE DIAGNOSIS:  Atrial fibrillation with rapid ventricular response ([facility] [address] (5/13/2019)      Overview: 5/14/19 MIRIAM and successful cardioversion to SR  COPD exacerbation ([facility] [address] (5/13/2019)  HTN (hypertension) (5/13/2019)  Hyperlipidemia (5/13/2019)  Hyperthyroidism (5/15/2019)    MEDICATIONS:  As per medication reconciliation  list  · It is important that you take the medication exactly as they are prescribed. · Keep your medication in the bottles provided by the pharmacist and keep a list of the medication names, dosages, and times to be taken in your wallet. · Do not take other medications without consulting your doctor. Pain Management: per above medications    What to do at Home    Recommended diet:  Cardiac Diet    Recommended activity: Activity as tolerated    If you experience any of the following symptoms then please call your primary care physician or return to the emergency room if you cannot get hold of your doctor:  Fever, chills, nausea, vomiting, diarrhea, change in mentation, falling, bleeding, shortness of breath, or any other concerning symptoms. Follow Up: With Cardiology as scheduled. With Dr. Shea Ngo as directed for Hyperthyroidism. With Pulmonology for New Patient Appointment. Information obtained by :  I understand that if any problems occur once I am at home I am to contact my physician. I understand and acknowledge receipt of the instructions indicated above.                                                                                                                                            Physician's or R.N.'s Signature                                                                  Date/Time                                                                                                                                              Patient or Representative Signature                                                          Date/Time

## 2019-05-16 LAB
TSH RECEP AB SER-ACNC: <0.5 IU/L (ref 0–1.75)
TSI ACT/NOR SER: <0.1 IU/L (ref 0–0.55)

## 2019-08-02 ENCOUNTER — OFFICE VISIT (OUTPATIENT)
Dept: INTERNAL MEDICINE CLINIC | Age: 64
End: 2019-08-02

## 2020-06-07 ENCOUNTER — HOSPITAL ENCOUNTER (OUTPATIENT)
Dept: MRI IMAGING | Age: 65
Discharge: HOME | End: 2020-06-07
Attending: FAMILY MEDICINE
Payer: COMMERCIAL

## 2020-06-07 DIAGNOSIS — M21.372 LEFT FOOT DROP: ICD-10-CM

## 2020-06-07 PROCEDURE — 72158 MRI LUMBAR SPINE W/O & W/DYE: CPT

## 2020-06-07 PROCEDURE — 74011250636 HC RX REV CODE- 250/636

## 2020-06-07 PROCEDURE — A9575 INJ GADOTERATE MEGLUMI 0.1ML: HCPCS

## 2020-06-07 RX ORDER — GADOTERATE MEGLUMINE 376.9 MG/ML
20 INJECTION INTRAVENOUS
Status: COMPLETED | OUTPATIENT
Start: 2020-06-07 | End: 2020-06-07

## 2020-06-07 RX ADMIN — GADOTERATE MEGLUMINE 20 ML: 376.9 INJECTION INTRAVENOUS at 09:00

## 2021-01-12 ENCOUNTER — APPOINTMENT (OUTPATIENT)
Dept: CT IMAGING | Age: 66
End: 2021-01-12
Attending: EMERGENCY MEDICINE

## 2021-01-12 ENCOUNTER — APPOINTMENT (OUTPATIENT)
Dept: GENERAL RADIOLOGY | Age: 66
End: 2021-01-12
Attending: EMERGENCY MEDICINE

## 2021-01-12 ENCOUNTER — HOSPITAL ENCOUNTER (EMERGENCY)
Age: 66
Discharge: HOME | End: 2021-01-12
Attending: EMERGENCY MEDICINE

## 2021-01-12 VITALS
TEMPERATURE: 98.2 F | HEIGHT: 76 IN | BODY MASS INDEX: 29.22 KG/M2 | RESPIRATION RATE: 20 BRPM | HEART RATE: 81 BPM | WEIGHT: 240 LBS | OXYGEN SATURATION: 94 % | SYSTOLIC BLOOD PRESSURE: 145 MMHG | DIASTOLIC BLOOD PRESSURE: 74 MMHG

## 2021-01-12 DIAGNOSIS — M54.2 NECK PAIN: ICD-10-CM

## 2021-01-12 DIAGNOSIS — I67.1 INTRACRANIAL ANEURYSM: ICD-10-CM

## 2021-01-12 DIAGNOSIS — S09.90XA CLOSED HEAD INJURY, INITIAL ENCOUNTER: Primary | ICD-10-CM

## 2021-01-12 DIAGNOSIS — F10.920 ALCOHOLIC INTOXICATION WITHOUT COMPLICATION (HCC): ICD-10-CM

## 2021-01-12 LAB
ALBUMIN SERPL-MCNC: 3.7 G/DL (ref 3.5–5)
ALBUMIN/GLOB SERPL: 0.8 {RATIO} (ref 1.1–2.2)
ALP SERPL-CCNC: 142 U/L (ref 45–117)
ALT SERPL-CCNC: 62 U/L (ref 12–78)
ANION GAP SERPL CALC-SCNC: 6 MMOL/L (ref 5–15)
AST SERPL-CCNC: 56 U/L (ref 15–37)
BASOPHILS # BLD: 0.1 K/UL (ref 0–0.1)
BASOPHILS NFR BLD: 1 % (ref 0–1)
BILIRUB SERPL-MCNC: 0.9 MG/DL (ref 0.2–1)
BUN SERPL-MCNC: 11 MG/DL (ref 6–20)
BUN/CREAT SERPL: 9 (ref 12–20)
CALCIUM SERPL-MCNC: 9.2 MG/DL (ref 8.5–10.1)
CHLORIDE SERPL-SCNC: 100 MMOL/L (ref 97–108)
CO2 SERPL-SCNC: 28 MMOL/L (ref 21–32)
CREAT SERPL-MCNC: 1.24 MG/DL (ref 0.7–1.3)
DIFFERENTIAL METHOD BLD: ABNORMAL
EOSINOPHIL # BLD: 0.1 K/UL (ref 0–0.4)
EOSINOPHIL NFR BLD: 1 % (ref 0–7)
ERYTHROCYTE [DISTWIDTH] IN BLOOD BY AUTOMATED COUNT: 12.6 % (ref 11.5–14.5)
ETHANOL SERPL-MCNC: 207 MG/DL
GLOBULIN SER CALC-MCNC: 4.5 G/DL (ref 2–4)
GLUCOSE SERPL-MCNC: 116 MG/DL (ref 65–100)
HCT VFR BLD AUTO: 47.3 % (ref 36.6–50.3)
HGB BLD-MCNC: 15.8 G/DL (ref 12.1–17)
IMM GRANULOCYTES # BLD AUTO: 0.1 K/UL (ref 0–0.04)
IMM GRANULOCYTES NFR BLD AUTO: 1 % (ref 0–0.5)
LYMPHOCYTES # BLD: 1.8 K/UL (ref 0.8–3.5)
LYMPHOCYTES NFR BLD: 16 % (ref 12–49)
MAGNESIUM SERPL-MCNC: 2.5 MG/DL (ref 1.6–2.4)
MCH RBC QN AUTO: 31.9 PG (ref 26–34)
MCHC RBC AUTO-ENTMCNC: 33.4 G/DL (ref 30–36.5)
MCV RBC AUTO: 95.4 FL (ref 80–99)
MONOCYTES # BLD: 1.3 K/UL (ref 0–1)
MONOCYTES NFR BLD: 11 % (ref 5–13)
NEUTS SEG # BLD: 8 K/UL (ref 1.8–8)
NEUTS SEG NFR BLD: 70 % (ref 32–75)
NRBC # BLD: 0 K/UL (ref 0–0.01)
NRBC BLD-RTO: 0 PER 100 WBC
PLATELET # BLD AUTO: 218 K/UL (ref 150–400)
PMV BLD AUTO: 9.7 FL (ref 8.9–12.9)
POTASSIUM SERPL-SCNC: 3.9 MMOL/L (ref 3.5–5.1)
PROT SERPL-MCNC: 8.2 G/DL (ref 6.4–8.2)
RBC # BLD AUTO: 4.96 M/UL (ref 4.1–5.7)
SODIUM SERPL-SCNC: 134 MMOL/L (ref 136–145)
TROPONIN I SERPL-MCNC: <0.05 NG/ML
WBC # BLD AUTO: 11.3 K/UL (ref 4.1–11.1)

## 2021-01-12 PROCEDURE — 72125 CT NECK SPINE W/O DYE: CPT

## 2021-01-12 PROCEDURE — 80053 COMPREHEN METABOLIC PANEL: CPT

## 2021-01-12 PROCEDURE — 74011250637 HC RX REV CODE- 250/637: Performed by: EMERGENCY MEDICINE

## 2021-01-12 PROCEDURE — 85025 COMPLETE CBC W/AUTO DIFF WBC: CPT

## 2021-01-12 PROCEDURE — 70450 CT HEAD/BRAIN W/O DYE: CPT

## 2021-01-12 PROCEDURE — 82077 ASSAY SPEC XCP UR&BREATH IA: CPT

## 2021-01-12 PROCEDURE — 70496 CT ANGIOGRAPHY HEAD: CPT

## 2021-01-12 PROCEDURE — 99284 EMERGENCY DEPT VISIT MOD MDM: CPT

## 2021-01-12 PROCEDURE — 93005 ELECTROCARDIOGRAM TRACING: CPT

## 2021-01-12 PROCEDURE — 74011250636 HC RX REV CODE- 250/636: Performed by: EMERGENCY MEDICINE

## 2021-01-12 PROCEDURE — 96375 TX/PRO/DX INJ NEW DRUG ADDON: CPT

## 2021-01-12 PROCEDURE — 84484 ASSAY OF TROPONIN QUANT: CPT

## 2021-01-12 PROCEDURE — 74011000636 HC RX REV CODE- 636: Performed by: EMERGENCY MEDICINE

## 2021-01-12 PROCEDURE — 96374 THER/PROPH/DIAG INJ IV PUSH: CPT

## 2021-01-12 PROCEDURE — 83735 ASSAY OF MAGNESIUM: CPT

## 2021-01-12 PROCEDURE — 36415 COLL VENOUS BLD VENIPUNCTURE: CPT

## 2021-01-12 PROCEDURE — 71045 X-RAY EXAM CHEST 1 VIEW: CPT

## 2021-01-12 RX ORDER — ONDANSETRON 2 MG/ML
4 INJECTION INTRAMUSCULAR; INTRAVENOUS
Status: COMPLETED | OUTPATIENT
Start: 2021-01-12 | End: 2021-01-12

## 2021-01-12 RX ORDER — MORPHINE SULFATE 2 MG/ML
4 INJECTION, SOLUTION INTRAMUSCULAR; INTRAVENOUS
Status: COMPLETED | OUTPATIENT
Start: 2021-01-12 | End: 2021-01-12

## 2021-01-12 RX ORDER — OXYCODONE AND ACETAMINOPHEN 5; 325 MG/1; MG/1
1 TABLET ORAL
Status: COMPLETED | OUTPATIENT
Start: 2021-01-12 | End: 2021-01-12

## 2021-01-12 RX ORDER — OXYCODONE AND ACETAMINOPHEN 5; 325 MG/1; MG/1
1 TABLET ORAL
Qty: 12 TAB | Refills: 0 | Status: SHIPPED | OUTPATIENT
Start: 2021-01-12 | End: 2021-01-15

## 2021-01-12 RX ADMIN — IOPAMIDOL 100 ML: 755 INJECTION, SOLUTION INTRAVENOUS at 18:10

## 2021-01-12 RX ADMIN — SODIUM CHLORIDE 1000 ML: 9 INJECTION, SOLUTION INTRAVENOUS at 18:32

## 2021-01-12 RX ADMIN — OXYCODONE AND ACETAMINOPHEN 1 TABLET: 5; 325 TABLET ORAL at 20:45

## 2021-01-12 RX ADMIN — MORPHINE SULFATE 4 MG: 2 INJECTION, SOLUTION INTRAMUSCULAR; INTRAVENOUS at 19:27

## 2021-01-12 RX ADMIN — ONDANSETRON 4 MG: 2 INJECTION INTRAMUSCULAR; INTRAVENOUS at 19:25

## 2021-01-12 NOTE — ED NOTES
Loc [facility] [address]. 2107- Discharge instructions given to patient by Dr. Chloé Rosario. Verbalized understanding of instructions. Patient discharged without difficulty. Patient discharged in stable condition ambulatory accompanied by RN.

## 2021-01-12 NOTE — BSMART NOTE
Comprehensive Assessment Form Part 1 Section I - Disposition Axis I - *** Axis II - *** Axis III - COPD exacerbation ([facility] [address] (5/13/2019) HTN (hypertension) (5/13/2019) Hyperlipidemia (5/13/2019) Hyperthyroidism (5/15/2019)* Axis IV - *** Axis V - *** The Medical Doctor to Psychiatrist conference was not completed. Medical doctor is in agreement with this counselor's assessment and plan of care. The plan is ***. The physician consulted was Dr. Claire Angulo. The admitting Psychiatrist will be Dr. Rj Lei The admitting Diagnosis is ***. The Payor source is ***. The name of the representative was ***. This was {[facility] APPROVED/NOT APPROVED:15962}. Section II - Integrated Summary Summary:    
Patient is a 75 yo male who arrives at ED via EMS accompanied by with chief complaint of   
 
 
Patient denies suicidal ideation, denies homicidal ideation, denies auditory/visual hallucinations, is not delusional, and is oriented X4. Patient's ETOH is <10, drug screen is positive for negative for, and pregnancy test is negative. Covid test is pending. Thought process was linear, coherent, and goal directed. Speech was clear and spontaneous with normal rate, rhythm and volume. Patient's memory appears intact and fund of knowledge is adequate. Patient has history of psych admissions, reports no previous suicide attempts, is not followed by a psychiatrist or counselor, or prescribed any psych medications. Patient is amenable to a voluntary psychiatric admission. The patient {has/has not:43963} demonstrated mental capacity to provide informed consent. The information is given by the {Information source:84770}. The Chief Complaint is ***. The Precipitant Factors are ***. Previous Hospitalizations: *** The patient {HAS BEEN IN RESTRAINTS:62843} Current Psychiatrist and/or  is ***. Lethality Assessment: The potential for suicide noted by the following: {Suicide Potential Choices:21512} . The potential for homicide is {NOTED:69159}. The patient {HAS/NOT:76696} been a perpetrator of sexual or physical abuse. There {ARE/ARE NOT PENDING [facility]} The patient {IS/IS NOT:56771} felt to be at risk for self harm or harm to others. The attending nurse was advised no further monitoring is necessary at this time. Section III - Psychosocial 
The patient's overall mood and attitude is ***. Feelings of helplessness and hopelessness are {OBSERVED/NOT OBSERVED:78709}. Generalized anxiety is {OBSERVED/NOT OBSERVED:96465}. Panic is {OBSERVED/NOT OBSERVED:27837}. Phobias are {OBSERVED/NOT OBSERVED:70344}. Obsessive compulsive tendencies are {OBSERVED/NOT OBSERVED:90504}. Section IV - Mental Status Exam 
The patient's appearance {APPEARANCE:82051}. The patient's behavior {BEHAVIOR:56891}. The patient is {ORIENTATION:07781::\"oriented to time, place, person and situation\"}. The patient's speech {SPEECH:49225}. The patient's mood {MOOD BH:09257}. The range of affect {RANGE OF TMIUSE:20517}. The patient's thought content demonstrates {THOUGHT CONTENT:06801}. The thought process {THOUGHT PROCESS:14576}. The patient's perception {PERCEPTION:99659}. The patient's memory {MEMORY BH:80386}. The patient's appetite {APPETITIE:45123}. The patient's sleep {SLEEP BH:61937}. {JCDZITT:84722}. The patient's judgement {JUDGEMENT:11156}. Section V - Substance Abuse The patient {IS/NOT:19568} using substances. The patient is using {SUBSTANCE TYPE:37325}. The patient has experienced the following withdrawal symptoms: {SUBSTANCE ABUSE SYMPTOMS:19568}. Section VI - Living Arrangements The patient {MARITIAL STATUS:94559}. The patient lives {CAREGIVER:38655}. The patient has {CHILDREN:34109}. The patient {DOES/DOES NOT/WILD CARD (D):82924} plan to return home upon discharge. The patient {DOES/DOES NOT/WILD CARD (D):85350} have legal issues pending. The patient's source of income comes from [facility] SOURCE:}. Faith and cultural practices {CULTURAL/[employer]:} The patient's greatest support comes from *** and this person {WILL/WILL NOT:59091} be involved with the treatment. The patient {HAS/HAS NOT:[id number]} been in an event described as horrible or outside the realm of ordinary life experience either currently or in the past. 
The patient {HAS/NOT:20162} been a victim of sexual/physical abuse. Section VII - Other Areas of Clinical Concern The highest grade achieved is *** with the overall quality of school experience being described as ***. The patient is currently {EMPLOYED:97484} and speaks {LANGUAGE:68986} as a primary language. The patient has {IMPAIRED COMMUNICATION:78064} affecting communication. The patient's preference for learning can be described as: {Learning assessment:91922}. The patient's hearing is { HEARIN}. The patient's vision is { VISION:69196}.  
 
 
Alexandra Moser LPC

## 2021-01-13 LAB
ATRIAL RATE: 86 BPM
CALCULATED P AXIS, ECG09: 59 DEGREES
CALCULATED R AXIS, ECG10: 83 DEGREES
CALCULATED T AXIS, ECG11: 73 DEGREES
DIAGNOSIS, 93000: NORMAL
P-R INTERVAL, ECG05: 152 MS
Q-T INTERVAL, ECG07: 386 MS
QRS DURATION, ECG06: 84 MS
QTC CALCULATION (BEZET), ECG08: 461 MS
VENTRICULAR RATE, ECG03: 86 BPM

## 2021-01-13 NOTE — BSMART NOTE
Kait has spoken to ED provider who reports patient is only requesting out-patient community resources for substance abuse (alcohol) and a full consult is unnecessary at this time. Multiple out-patient community resources were provided to patient.

## 2021-01-13 NOTE — ED PROVIDER NOTES
EMERGENCY DEPARTMENT HISTORY AND PHYSICAL EXAM      Date: 1/12/2021  Patient Name: Kallie Hernandez    History of Presenting Illness     Chief Complaint   Patient presents with    Mental Health Problem     depression has been drinking etoh, daughter with patient-blacking out       History Provided By: Patient    HPI: Kallie Hernandez, 76 y.o. male presents to the ED with cc of headache, syncope and alcohol dependence. Patient states that he has been drinking consistently for 8 years, since his son passed away. He usually drinks 3 tall beers, but can drink large amount of [place] as well on a daily basis. He still suffers from anxiety and depression. He denies suicidal ideation. He passed out and injured his head several days ago. He has had a headache and right-sided neck pain since then. Pain is severe. He denies numbness, tingling, nausea, dizziness or weakness. Patient has been using alcohol to assuage his pain. There are no other complaints, changes, or physical findings at this time. PCP: Unknown, Provider    No current facility-administered medications on file prior to encounter. Current Outpatient Medications on File Prior to Encounter   Medication Sig Dispense Refill    OTHER Please excuse Mr. Gertrude Alfonso from work on 5/13/19 through 5/15/19 as he was in the hospital with an acute illness. He should also be excused on 5/16/19 to allow for further recovery from his illness. He may return to work on 5/17/19 without restriction. 1 Each 0    amiodarone (CORDARONE) 200 mg tablet Take 1 Tab by mouth two (2) times a day. Start on Friday evening (5/17/19) 60 Tab 0    apixaban (ELIQUIS) 5 mg tablet Take 1 Tab by mouth every twelve (12) hours. 60 Tab 0    methIMAzole (TAPAZOLE) 10 mg tablet Take 1 Tab by mouth daily. 30 Tab 0    rosuvastatin (CRESTOR) 5 mg tablet Take 5 mg by mouth daily.  amLODIPine (NORVASC) 10 mg tablet Take 10 mg by mouth daily.       umeclidinium-vilanterol (ANORO ELLIPTA) 62.5-25 mcg/actuation inhaler Take 1 Puff by inhalation daily.  albuterol (PROAIR HFA) 90 mcg/actuation inhaler Take  by inhalation.  doxycycline (MONODOX) 100 mg capsule Take 100 mg by mouth two (2) times a day.  predniSONE (STERAPRED DS) 10 mg dose pack Take  by mouth See Admin Instructions. See administration instruction per 10mg dose pack      multivitamin (ONE A DAY) tablet Take 1 Tab by mouth daily. Past History     Past Medical History:  Past Medical History:   Diagnosis Date    Cancer ([address]     PROSTATE    Hyperthyroidism 5/15/2019    Psychiatric disorder     ANXIETY (SITUATIONAL)       Past Surgical History:  Past Surgical History:   Procedure Laterality Date    HX GI  2003    18 \" COLON RESECTION (DIVERTICULITIS)    HX UROLOGICAL      CYSTO/BIOPSY PROSTATE    MI ABDOMEN SURGERY PROC UNLISTED      UMBILICAL HERNIA       Family History:  Family History   Problem Relation Age of Onset    Cancer Mother         LUNG    Heart Disease Mother     Cancer Father         PROSTATE    Diabetes Maternal Grandmother     Anesth Problems Neg Hx        Social History:  Social History     Tobacco Use    Smoking status: Current Every Day Smoker     Packs/day: 1.00     Years: 47.00     Pack years: 47.00    Smokeless tobacco: Never Used   Substance Use Topics    Alcohol use: Yes     Alcohol/week: 5.0 standard drinks     Types: 6 Cans of beer per week    Drug use: Yes     Types: Marijuana     Comment: TWICE IN PAST 6 MOS. Allergies:  No Known Allergies      Review of Systems   Review of Systems   Constitutional: Negative for fever. HENT: Negative for congestion. Eyes: Negative. Respiratory: Negative for shortness of breath. Cardiovascular: Negative for chest pain. Gastrointestinal: Negative for abdominal pain. Endocrine: Negative for heat intolerance. Musculoskeletal: Positive for neck pain. Negative for back pain. Skin: Negative for rash. Allergic/Immunologic: Negative for immunocompromised state. Neurological: Positive for syncope and headaches. Hematological: Does not bruise/bleed easily. Psychiatric/Behavioral: The patient is nervous/anxious. All other systems reviewed and are negative. Physical Exam   Physical Exam  Vitals signs and nursing note reviewed. Constitutional:       General: He is not in acute distress. Appearance: He is well-developed. HENT:      Head: Normocephalic and atraumatic. Neck:      Musculoskeletal: Normal range of motion. Comments: Right neck tenderness  Cardiovascular:      Rate and Rhythm: Normal rate and regular rhythm. Pulses: Normal pulses. Heart sounds: Normal heart sounds. Pulmonary:      Effort: Pulmonary effort is normal.      Breath sounds: Normal breath sounds. Abdominal:      General: Bowel sounds are normal.      Palpations: Abdomen is soft. Musculoskeletal: Normal range of motion. Skin:     General: Skin is warm and dry. Neurological:      General: No focal deficit present. Mental Status: He is alert and oriented to person, place, and time.       Coordination: Coordination normal.   Psychiatric:         Mood and Affect: Mood normal.         Behavior: Behavior normal.         Diagnostic Study Results     Labs -     Recent Results (from the past 12 hour(s))   ETHYL ALCOHOL    Collection Time: 01/12/21  4:46 PM   Result Value Ref Range    ALCOHOL(ETHYL),SERUM 207 (H) <10 MG/DL   CBC WITH AUTOMATED DIFF    Collection Time: 01/12/21  4:46 PM   Result Value Ref Range    WBC 11.3 (H) 4.1 - 11.1 K/uL    RBC 4.96 4.10 - 5.70 M/uL    HGB 15.8 12.1 - 17.0 g/dL    HCT 47.3 36.6 - 50.3 %    MCV 95.4 80.0 - 99.0 FL    MCH 31.9 26.0 - 34.0 PG    MCHC 33.4 30.0 - 36.5 g/dL    RDW 12.6 11.5 - 14.5 %    PLATELET 231 318 - 344 K/uL    MPV 9.7 8.9 - 12.9 FL    NRBC 0.0 0  WBC    ABSOLUTE NRBC 0.00 0.00 - 0.01 K/uL    NEUTROPHILS 70 32 - 75 %    LYMPHOCYTES 16 12 - 49 % MONOCYTES 11 5 - 13 %    EOSINOPHILS 1 0 - 7 %    BASOPHILS 1 0 - 1 %    IMMATURE GRANULOCYTES 1 (H) 0.0 - 0.5 %    ABS. NEUTROPHILS 8.0 1.8 - 8.0 K/UL    ABS. LYMPHOCYTES 1.8 0.8 - 3.5 K/UL    ABS. MONOCYTES 1.3 (H) 0.0 - 1.0 K/UL    ABS. EOSINOPHILS 0.1 0.0 - 0.4 K/UL    ABS. BASOPHILS 0.1 0.0 - 0.1 K/UL    ABS. IMM. GRANS. 0.1 (H) 0.00 - 0.04 K/UL    DF AUTOMATED     METABOLIC PANEL, COMPREHENSIVE    Collection Time: 01/12/21  4:46 PM   Result Value Ref Range    Sodium 134 (L) 136 - 145 mmol/L    Potassium 3.9 3.5 - 5.1 mmol/L    Chloride 100 97 - 108 mmol/L    CO2 28 21 - 32 mmol/L    Anion gap 6 5 - 15 mmol/L    Glucose 116 (H) 65 - 100 mg/dL    BUN 11 6 - 20 MG/DL    Creatinine 1.24 0.70 - 1.30 MG/DL    BUN/Creatinine ratio 9 (L) 12 - 20      GFR est AA >60 >60 ml/min/1.73m2    GFR est non-AA 58 (L) >60 ml/min/1.73m2    Calcium 9.2 8.5 - 10.1 MG/DL    Bilirubin, total 0.9 0.2 - 1.0 MG/DL    ALT (SGPT) 62 12 - 78 U/L    AST (SGOT) 56 (H) 15 - 37 U/L    Alk.  phosphatase 142 (H) 45 - 117 U/L    Protein, total 8.2 6.4 - 8.2 g/dL    Albumin 3.7 3.5 - 5.0 g/dL    Globulin 4.5 (H) 2.0 - 4.0 g/dL    A-G Ratio 0.8 (L) 1.1 - 2.2     MAGNESIUM    Collection Time: 01/12/21  4:46 PM   Result Value Ref Range    Magnesium 2.5 (H) 1.6 - 2.4 mg/dL   TROPONIN I    Collection Time: 01/12/21  4:46 PM   Result Value Ref Range    Troponin-I, Qt. <0.05 <0.05 ng/mL   EKG, 12 LEAD, INITIAL    Collection Time: 01/12/21  4:53 PM   Result Value Ref Range    Ventricular Rate 86 BPM    Atrial Rate 86 BPM    P-R Interval 152 ms    QRS Duration 84 ms    Q-T Interval 386 ms    QTC Calculation (Bezet) 461 ms    Calculated P Axis 59 degrees    Calculated R Axis 83 degrees    Calculated T Axis 73 degrees    Diagnosis       Normal sinus rhythm  Nonspecific T wave abnormality  Prolonged QT  When compared with ECG of 14-MAY-2019 14:52,  Nonspecific T wave abnormality, worse in Lateral leads         Radiologic Studies -   CT HEAD WO CONT   Final Result   IMPRESSION:    No acute intracranial process. CT SPINE CERV WO CONT   Final Result   IMPRESSION:      1. No acute findings. 2. Multilevel degenerative changes. 3. Emphysema. CTA HEAD NECK W CONT         XR CHEST PORT   Final Result   IMPRESSION: Normal chest.          CXR Results  (Last 48 hours)               01/12/21 1714  XR CHEST PORT Final result    Impression:  IMPRESSION: Normal chest.       Narrative:  EXAM: XR CHEST PORT       INDICATION: syncope etoh       COMPARISON: 2019       FINDINGS: A portable AP radiograph of the chest was obtained at 1707 hours. The   patient is on a cardiac monitor. The lungs are clear. The cardiac and   mediastinal contours and pulmonary vascularity are normal.  The bones and soft   tissues are grossly within normal limits. Medical Decision Making   I am the first provider for this patient. I reviewed the vital signs, available nursing notes, past medical history, past surgical history, family history and social history. Vital Signs-Reviewed the patient's vital signs. Patient Vitals for the past 12 hrs:   Temp Pulse Resp BP SpO2   01/12/21 1831    124/70 96 %   01/12/21 1643 98.2 °F (36.8 °C) 81 20 127/74 97 %       EKG interpretation: (Preliminary)  Rhythm: normal sinus rhythm; and regular . Rate (approx.): 86; Axis: normal; OR interval: normal; QRS interval: normal ; ST/T wave: non-specific changes; Other findings: .    Records Reviewed: Nursing Notes and Old Medical Records    Provider Notes (Medical Decision Making):   Alcohol intoxication, anxiety, depression, intracranial hemorrhage, dissection, fracture, sprain, contusion    ED Course:   Initial assessment performed. The patients presenting problems have been discussed, and they are in agreement with the care plan formulated and outlined with them. I have encouraged them to ask questions as they arise throughout their visit.       Progress note:      Patient's results were reviewed. The patient is advised to follow-up and return to ER if worse    Consult note:    Brittni Arellano, from [place], provided resources for Psychiatric and addiction assistance               Critical Care Time:   0    Disposition:  home    DISCHARGE PLAN:  1. Discharge Medication List as of 1/12/2021  8:46 PM      START taking these medications    Details   oxyCODONE-acetaminophen (Percocet) 5-325 mg per tablet Take 1 Tab by mouth every six (6) hours as needed for Pain for up to 3 days. Max Daily Amount: 4 Tabs., Normal, Disp-12 Tab, R-0         CONTINUE these medications which have NOT CHANGED    Details   OTHER Please excuse Mr. Ekaterina Jean-Baptiste from work on 5/13/19 through 5/15/19 as he was in the hospital with an acute illness. He should also be excused on 5/16/19 to allow for further recovery from his illness. He may return to work on 5/17/19 without restriction. , P rint, Disp-1 Each, R-0      amiodarone (CORDARONE) 200 mg tablet Take 1 Tab by mouth two (2) times a day. Start on Friday evening (5/17/19), Print, Disp-60 Tab, R-0      apixaban (ELIQUIS) 5 mg tablet Take 1 Tab by mouth every twelve (12) hours. , Print, Disp-60 Tab, R-0      methIMAzole (TAPAZOLE) 10 mg tablet Take 1 Tab by mouth daily. , Print, Disp-30 Tab, R-0      rosuvastatin (CRESTOR) 5 mg tablet Take 5 mg by mouth daily. , Historical Med      amLODIPine (NORVASC) 10 mg tablet Take 10 mg by mouth daily. , Historical Med      umeclidinium-vilanterol (ANORO ELLIPTA) 62.5-25 mcg/actuation inhaler Take 1 Puff by inhalation daily. , Historical Med      albuterol (PROAIR HFA) 90 mcg/actuation inhaler Take  by inhalation. , Historical Med      doxycycline (MONODOX) 100 mg capsule Take 100 mg by mouth two (2) times a day., Historical Med      predniSONE (STERAPRED DS) 10 mg dose pack Take  by mouth See Admin Instructions. See administration instruction per 10mg dose pack, Historical Med      multivitamin (ONE A DAY) tablet Take 1 Tab by mouth daily. , Historical Med           2.   Follow-up Information     Follow up With Specialties Details Why Contact Info    Mary Lopez MD Neurosurgery Call in 1 day  [address] [facility]  [phone]      [facility] EMERGENCY DEPT Emergency Medicine  If symptoms worsen [address]  [address] [facility]  [phone]        3. Return to ED if worse     Diagnosis     Clinical Impression:   1. Closed head injury, initial encounter    2. Neck pain    3. Intracranial aneurysm    4. Alcoholic intoxication without complication ([facility] [address]        Attestations:    Noman Cintron MD    Please note that this dictation was completed with [employer], the computer voice recognition software. Quite often unanticipated grammatical, syntax, homophones, and other interpretive errors are inadvertently transcribed by the computer software. Please disregard these errors. Please excuse any errors that have escaped final proofreading. Thank you.

## 2022-03-19 PROBLEM — E05.90 HYPERTHYROIDISM: Status: ACTIVE | Noted: 2019-05-15

## 2022-03-19 PROBLEM — I10 HTN (HYPERTENSION): Status: ACTIVE | Noted: 2019-05-13

## 2022-03-19 PROBLEM — E78.5 HYPERLIPIDEMIA: Status: ACTIVE | Noted: 2019-05-13

## 2022-03-19 PROBLEM — J44.1 COPD EXACERBATION (HCC): Status: ACTIVE | Noted: 2019-05-13

## 2022-03-20 PROBLEM — I48.91 ATRIAL FIBRILLATION WITH RAPID VENTRICULAR RESPONSE (HCC): Status: ACTIVE | Noted: 2019-05-13

## 2023-05-11 RX ORDER — AMLODIPINE BESYLATE 10 MG/1
10 TABLET ORAL DAILY
COMMUNITY

## 2023-05-11 RX ORDER — DOXYCYCLINE 100 MG/1
CAPSULE ORAL 2 TIMES DAILY
COMMUNITY

## 2023-05-11 RX ORDER — METHIMAZOLE 10 MG/1
TABLET ORAL DAILY
COMMUNITY
Start: 2019-05-15

## 2023-05-11 RX ORDER — ALBUTEROL SULFATE 90 UG/1
AEROSOL, METERED RESPIRATORY (INHALATION)
COMMUNITY

## 2023-05-11 RX ORDER — AMIODARONE HYDROCHLORIDE 200 MG/1
TABLET ORAL 2 TIMES DAILY
COMMUNITY
Start: 2019-05-17

## 2023-05-11 RX ORDER — PREDNISONE 10 MG/1
TABLET ORAL SEE ADMIN INSTRUCTIONS
COMMUNITY

## 2023-05-11 RX ORDER — ROSUVASTATIN CALCIUM 5 MG/1
5 TABLET, COATED ORAL DAILY
COMMUNITY

## 2023-07-24 RX ORDER — AMLODIPINE BESYLATE 10 MG/1
TABLET ORAL
Qty: 30 TABLET | Refills: 0 | Status: SHIPPED | OUTPATIENT
Start: 2023-07-24

## 2023-07-24 RX ORDER — HYDROCHLOROTHIAZIDE 12.5 MG/1
TABLET ORAL
Qty: 30 TABLET | Refills: 0 | Status: SHIPPED | OUTPATIENT
Start: 2023-07-24

## 2023-07-24 RX ORDER — ROSUVASTATIN CALCIUM 20 MG/1
TABLET, COATED ORAL
Qty: 30 TABLET | Refills: 0 | Status: SHIPPED | OUTPATIENT
Start: 2023-07-24

## 2023-07-24 NOTE — TELEPHONE ENCOUNTER
PCP: No primary care provider on file. Last appt: Visit date not found  No future appointments.     Requested Prescriptions     Pending Prescriptions Disp Refills    hydroCHLOROthiazide (HYDRODIURIL) 12.5 MG tablet [Pharmacy Med Name: *HYDROCHLOROTHIAZIDE 12.5MG] 30 tablet 0     Sig: TAKE 1 TABLET IN THE MORNING ORALLY ONCE A DAY FOR BLOOD PRESSURE    amLODIPine (NORVASC) 10 MG tablet [Pharmacy Med Name: *AMLODIPINE 10MG] 30 tablet 0     Sig: TAKE 1 TABLET EVERY DAY FOR BLOOD PRESSURE    rosuvastatin (CRESTOR) 20 MG tablet [Pharmacy Med Name: *ROSUVASTATIN 20MG] 30 tablet 0     Sig: TAKE 1 TABLET EVERY DAY TO LOWER CHOLESTEROL       Prior labs and Blood pressures:  BP Readings from Last 3 Encounters:   No data found for BP     Lab Results   Component Value Date/Time     01/12/2021 04:46 PM    K 3.9 01/12/2021 04:46 PM     01/12/2021 04:46 PM    CO2 28 01/12/2021 04:46 PM    BUN 11 01/12/2021 04:46 PM    GFRAA >60 01/12/2021 04:46 PM     No results found for: HBA1C, NSL8DLXF  No results found for: CHOL, CHOLPOCT, CHOLX, CHLST, CHOLV, HDL, HDLPOC, HDLC, LDL, LDLC, VLDLC, VLDL, TGLX, TRIGL  No results found for: VITD3, VD3RIA        No results found for: TSH, TSH2, TSH3

## 2024-03-25 ENCOUNTER — HOSPITAL ENCOUNTER (INPATIENT)
Facility: HOSPITAL | Age: 69
LOS: 5 days | Discharge: HOME | End: 2024-03-30
Attending: EMERGENCY MEDICINE | Admitting: STUDENT IN AN ORGANIZED HEALTH CARE EDUCATION/TRAINING PROGRAM
Payer: MEDICARE

## 2024-03-25 ENCOUNTER — APPOINTMENT (OUTPATIENT)
Facility: HOSPITAL | Age: 69
End: 2024-03-25
Payer: MEDICARE

## 2024-03-25 DIAGNOSIS — L97.514: ICD-10-CM

## 2024-03-25 DIAGNOSIS — M86.171 OTHER ACUTE OSTEOMYELITIS OF RIGHT FOOT (HCC): Primary | ICD-10-CM

## 2024-03-25 PROBLEM — M86.9 OSTEOMYELITIS (HCC): Status: ACTIVE | Noted: 2024-03-25

## 2024-03-25 LAB
ALBUMIN SERPL-MCNC: 3.6 G/DL (ref 3.5–5)
ALBUMIN/GLOB SERPL: 0.8 (ref 1.1–2.2)
ALP SERPL-CCNC: 138 U/L (ref 45–117)
ALT SERPL-CCNC: 29 U/L (ref 12–78)
ANION GAP SERPL CALC-SCNC: 5 MMOL/L (ref 5–15)
AST SERPL-CCNC: 23 U/L (ref 15–37)
BASOPHILS # BLD: 0.1 K/UL (ref 0–0.1)
BASOPHILS NFR BLD: 1 % (ref 0–1)
BILIRUB SERPL-MCNC: 0.8 MG/DL (ref 0.2–1)
BUN SERPL-MCNC: 8 MG/DL (ref 6–20)
BUN/CREAT SERPL: 10 (ref 12–20)
CALCIUM SERPL-MCNC: 10 MG/DL (ref 8.5–10.1)
CHLORIDE SERPL-SCNC: 101 MMOL/L (ref 97–108)
CO2 SERPL-SCNC: 30 MMOL/L (ref 21–32)
CREAT SERPL-MCNC: 0.82 MG/DL (ref 0.7–1.3)
CRP SERPL-MCNC: 5.67 MG/DL (ref 0–0.3)
DIFFERENTIAL METHOD BLD: ABNORMAL
ECHO BSA: 2.28 M2
EOSINOPHIL # BLD: 0.1 K/UL (ref 0–0.4)
EOSINOPHIL NFR BLD: 1 % (ref 0–7)
ERYTHROCYTE [DISTWIDTH] IN BLOOD BY AUTOMATED COUNT: 13.6 % (ref 11.5–14.5)
ERYTHROCYTE [SEDIMENTATION RATE] IN BLOOD: 27 MM/HR (ref 0–20)
GLOBULIN SER CALC-MCNC: 4.5 G/DL (ref 2–4)
GLUCOSE SERPL-MCNC: 132 MG/DL (ref 65–100)
HCT VFR BLD AUTO: 49.1 % (ref 36.6–50.3)
HGB BLD-MCNC: 15.9 G/DL (ref 12.1–17)
IMM GRANULOCYTES # BLD AUTO: 0 K/UL (ref 0–0.04)
IMM GRANULOCYTES NFR BLD AUTO: 1 % (ref 0–0.5)
LYMPHOCYTES # BLD: 1.4 K/UL (ref 0.8–3.5)
LYMPHOCYTES NFR BLD: 16 % (ref 12–49)
MCH RBC QN AUTO: 32.2 PG (ref 26–34)
MCHC RBC AUTO-ENTMCNC: 32.4 G/DL (ref 30–36.5)
MCV RBC AUTO: 99.4 FL (ref 80–99)
MONOCYTES # BLD: 0.7 K/UL (ref 0–1)
MONOCYTES NFR BLD: 8 % (ref 5–13)
NEUTS SEG # BLD: 6.5 K/UL (ref 1.8–8)
NEUTS SEG NFR BLD: 73 % (ref 32–75)
NRBC # BLD: 0 K/UL (ref 0–0.01)
NRBC BLD-RTO: 0 PER 100 WBC
PLATELET # BLD AUTO: 249 K/UL (ref 150–400)
PMV BLD AUTO: 9.6 FL (ref 8.9–12.9)
POTASSIUM SERPL-SCNC: 4.8 MMOL/L (ref 3.5–5.1)
PROT SERPL-MCNC: 8.1 G/DL (ref 6.4–8.2)
RBC # BLD AUTO: 4.94 M/UL (ref 4.1–5.7)
SODIUM SERPL-SCNC: 136 MMOL/L (ref 136–145)
WBC # BLD AUTO: 8.8 K/UL (ref 4.1–11.1)

## 2024-03-25 PROCEDURE — 96375 TX/PRO/DX INJ NEW DRUG ADDON: CPT

## 2024-03-25 PROCEDURE — 85025 COMPLETE CBC W/AUTO DIFF WBC: CPT

## 2024-03-25 PROCEDURE — A9579 GAD-BASE MR CONTRAST NOS,1ML: HCPCS | Performed by: STUDENT IN AN ORGANIZED HEALTH CARE EDUCATION/TRAINING PROGRAM

## 2024-03-25 PROCEDURE — 6360000002 HC RX W HCPCS: Performed by: STUDENT IN AN ORGANIZED HEALTH CARE EDUCATION/TRAINING PROGRAM

## 2024-03-25 PROCEDURE — 6370000000 HC RX 637 (ALT 250 FOR IP): Performed by: EMERGENCY MEDICINE

## 2024-03-25 PROCEDURE — 85652 RBC SED RATE AUTOMATED: CPT

## 2024-03-25 PROCEDURE — 94640 AIRWAY INHALATION TREATMENT: CPT

## 2024-03-25 PROCEDURE — 86140 C-REACTIVE PROTEIN: CPT

## 2024-03-25 PROCEDURE — 80053 COMPREHEN METABOLIC PANEL: CPT

## 2024-03-25 PROCEDURE — 99285 EMERGENCY DEPT VISIT HI MDM: CPT

## 2024-03-25 PROCEDURE — 1100000003 HC PRIVATE W/ TELEMETRY

## 2024-03-25 PROCEDURE — 96367 TX/PROPH/DG ADDL SEQ IV INF: CPT

## 2024-03-25 PROCEDURE — 73630 X-RAY EXAM OF FOOT: CPT

## 2024-03-25 PROCEDURE — 2580000003 HC RX 258: Performed by: STUDENT IN AN ORGANIZED HEALTH CARE EDUCATION/TRAINING PROGRAM

## 2024-03-25 PROCEDURE — 2580000003 HC RX 258: Performed by: EMERGENCY MEDICINE

## 2024-03-25 PROCEDURE — 96365 THER/PROPH/DIAG IV INF INIT: CPT

## 2024-03-25 PROCEDURE — 93005 ELECTROCARDIOGRAM TRACING: CPT | Performed by: STUDENT IN AN ORGANIZED HEALTH CARE EDUCATION/TRAINING PROGRAM

## 2024-03-25 PROCEDURE — 36415 COLL VENOUS BLD VENIPUNCTURE: CPT

## 2024-03-25 PROCEDURE — 87040 BLOOD CULTURE FOR BACTERIA: CPT

## 2024-03-25 PROCEDURE — 70450 CT HEAD/BRAIN W/O DYE: CPT

## 2024-03-25 PROCEDURE — 94760 N-INVAS EAR/PLS OXIMETRY 1: CPT

## 2024-03-25 PROCEDURE — 73720 MRI LWR EXTREMITY W/O&W/DYE: CPT

## 2024-03-25 PROCEDURE — 6360000004 HC RX CONTRAST MEDICATION: Performed by: STUDENT IN AN ORGANIZED HEALTH CARE EDUCATION/TRAINING PROGRAM

## 2024-03-25 PROCEDURE — 6360000002 HC RX W HCPCS: Performed by: EMERGENCY MEDICINE

## 2024-03-25 PROCEDURE — 6370000000 HC RX 637 (ALT 250 FOR IP): Performed by: STUDENT IN AN ORGANIZED HEALTH CARE EDUCATION/TRAINING PROGRAM

## 2024-03-25 PROCEDURE — 93971 EXTREMITY STUDY: CPT

## 2024-03-25 RX ORDER — HYDRALAZINE HYDROCHLORIDE 20 MG/ML
10 INJECTION INTRAMUSCULAR; INTRAVENOUS EVERY 6 HOURS PRN
Status: DISCONTINUED | OUTPATIENT
Start: 2024-03-25 | End: 2024-03-27 | Stop reason: HOSPADM

## 2024-03-25 RX ORDER — SODIUM CHLORIDE 0.9 % (FLUSH) 0.9 %
5-40 SYRINGE (ML) INJECTION EVERY 12 HOURS SCHEDULED
Status: DISCONTINUED | OUTPATIENT
Start: 2024-03-25 | End: 2024-03-30 | Stop reason: HOSPADM

## 2024-03-25 RX ORDER — DIAZEPAM 5 MG/1
15 TABLET ORAL
Status: DISCONTINUED | OUTPATIENT
Start: 2024-03-25 | End: 2024-03-30 | Stop reason: HOSPADM

## 2024-03-25 RX ORDER — ACETAMINOPHEN 325 MG/1
650 TABLET ORAL EVERY 4 HOURS PRN
Status: DISCONTINUED | OUTPATIENT
Start: 2024-03-25 | End: 2024-03-27 | Stop reason: SDUPTHER

## 2024-03-25 RX ORDER — DIAZEPAM 5 MG/1
10 TABLET ORAL
Status: DISCONTINUED | OUTPATIENT
Start: 2024-03-25 | End: 2024-03-30 | Stop reason: HOSPADM

## 2024-03-25 RX ORDER — FOLIC ACID 1 MG/1
1 TABLET ORAL DAILY
Status: DISCONTINUED | OUTPATIENT
Start: 2024-03-25 | End: 2024-03-30 | Stop reason: HOSPADM

## 2024-03-25 RX ORDER — DIAZEPAM 5 MG/ML
15 INJECTION, SOLUTION INTRAMUSCULAR; INTRAVENOUS
Status: DISCONTINUED | OUTPATIENT
Start: 2024-03-25 | End: 2024-03-30 | Stop reason: HOSPADM

## 2024-03-25 RX ORDER — SODIUM CHLORIDE 9 MG/ML
INJECTION, SOLUTION INTRAVENOUS PRN
Status: DISCONTINUED | OUTPATIENT
Start: 2024-03-25 | End: 2024-03-30 | Stop reason: HOSPADM

## 2024-03-25 RX ORDER — DIAZEPAM 5 MG/ML
10 INJECTION, SOLUTION INTRAMUSCULAR; INTRAVENOUS
Status: DISCONTINUED | OUTPATIENT
Start: 2024-03-25 | End: 2024-03-30 | Stop reason: HOSPADM

## 2024-03-25 RX ORDER — ZINC GLUCONATE 50 MG
50 TABLET ORAL DAILY
COMMUNITY

## 2024-03-25 RX ORDER — TURMERIC ROOT EXTRACT 500 MG
2 TABLET ORAL DAILY
COMMUNITY

## 2024-03-25 RX ORDER — ASPIRIN 325 MG
325 TABLET ORAL DAILY
COMMUNITY

## 2024-03-25 RX ORDER — M-VIT,TX,IRON,MINS/CALC/FOLIC 27MG-0.4MG
1 TABLET ORAL DAILY
COMMUNITY

## 2024-03-25 RX ORDER — NICOTINE 21 MG/24HR
1 PATCH, TRANSDERMAL 24 HOURS TRANSDERMAL
Status: COMPLETED | OUTPATIENT
Start: 2024-03-25 | End: 2024-03-26

## 2024-03-25 RX ORDER — DIAZEPAM 5 MG/ML
5 INJECTION, SOLUTION INTRAMUSCULAR; INTRAVENOUS
Status: DISCONTINUED | OUTPATIENT
Start: 2024-03-25 | End: 2024-03-30 | Stop reason: HOSPADM

## 2024-03-25 RX ORDER — ONDANSETRON 2 MG/ML
4 INJECTION INTRAMUSCULAR; INTRAVENOUS EVERY 6 HOURS PRN
Status: DISCONTINUED | OUTPATIENT
Start: 2024-03-25 | End: 2024-03-30 | Stop reason: HOSPADM

## 2024-03-25 RX ORDER — POTASSIUM CHLORIDE 7.45 MG/ML
10 INJECTION INTRAVENOUS PRN
Status: DISCONTINUED | OUTPATIENT
Start: 2024-03-25 | End: 2024-03-30 | Stop reason: HOSPADM

## 2024-03-25 RX ORDER — VITAMIN B COMPLEX
1 CAPSULE ORAL DAILY
COMMUNITY

## 2024-03-25 RX ORDER — POTASSIUM CHLORIDE 20 MEQ/1
40 TABLET, EXTENDED RELEASE ORAL PRN
Status: DISCONTINUED | OUTPATIENT
Start: 2024-03-25 | End: 2024-03-30 | Stop reason: HOSPADM

## 2024-03-25 RX ORDER — OMEGA-3 FATTY ACIDS/FISH OIL 300-1000MG
1000 CAPSULE ORAL DAILY
COMMUNITY

## 2024-03-25 RX ORDER — ACETAMINOPHEN 325 MG/1
650 TABLET ORAL EVERY 6 HOURS PRN
Status: DISCONTINUED | OUTPATIENT
Start: 2024-03-25 | End: 2024-03-30 | Stop reason: HOSPADM

## 2024-03-25 RX ORDER — ARFORMOTEROL TARTRATE 15 UG/2ML
15 SOLUTION RESPIRATORY (INHALATION)
Status: DISCONTINUED | OUTPATIENT
Start: 2024-03-25 | End: 2024-03-26

## 2024-03-25 RX ORDER — DIAZEPAM 5 MG/ML
20 INJECTION, SOLUTION INTRAMUSCULAR; INTRAVENOUS
Status: DISCONTINUED | OUTPATIENT
Start: 2024-03-25 | End: 2024-03-30 | Stop reason: HOSPADM

## 2024-03-25 RX ORDER — ONDANSETRON 2 MG/ML
4 INJECTION INTRAMUSCULAR; INTRAVENOUS EVERY 4 HOURS PRN
Status: DISCONTINUED | OUTPATIENT
Start: 2024-03-25 | End: 2024-03-27 | Stop reason: SDUPTHER

## 2024-03-25 RX ORDER — ROSUVASTATIN CALCIUM 5 MG/1
20 TABLET, COATED ORAL NIGHTLY
Status: DISCONTINUED | OUTPATIENT
Start: 2024-03-25 | End: 2024-03-30 | Stop reason: HOSPADM

## 2024-03-25 RX ORDER — ASCORBIC ACID 500 MG
1000 TABLET ORAL DAILY
COMMUNITY

## 2024-03-25 RX ORDER — NICOTINE 21 MG/24HR
1 PATCH, TRANSDERMAL 24 HOURS TRANSDERMAL DAILY
Status: DISCONTINUED | OUTPATIENT
Start: 2024-03-26 | End: 2024-03-30 | Stop reason: HOSPADM

## 2024-03-25 RX ORDER — ONDANSETRON 4 MG/1
4 TABLET, ORALLY DISINTEGRATING ORAL EVERY 8 HOURS PRN
Status: DISCONTINUED | OUTPATIENT
Start: 2024-03-25 | End: 2024-03-30 | Stop reason: HOSPADM

## 2024-03-25 RX ORDER — MAGNESIUM SULFATE IN WATER 40 MG/ML
2000 INJECTION, SOLUTION INTRAVENOUS PRN
Status: DISCONTINUED | OUTPATIENT
Start: 2024-03-25 | End: 2024-03-30 | Stop reason: HOSPADM

## 2024-03-25 RX ORDER — METHIMAZOLE 5 MG/1
10 TABLET ORAL DAILY
Status: DISCONTINUED | OUTPATIENT
Start: 2024-03-25 | End: 2024-03-25

## 2024-03-25 RX ORDER — AMIODARONE HYDROCHLORIDE 200 MG/1
200 TABLET ORAL 2 TIMES DAILY
Status: DISCONTINUED | OUTPATIENT
Start: 2024-03-25 | End: 2024-03-25

## 2024-03-25 RX ORDER — SODIUM CHLORIDE 0.9 % (FLUSH) 0.9 %
5-40 SYRINGE (ML) INJECTION PRN
Status: DISCONTINUED | OUTPATIENT
Start: 2024-03-25 | End: 2024-03-30 | Stop reason: HOSPADM

## 2024-03-25 RX ORDER — POLYETHYLENE GLYCOL 3350 17 G/17G
17 POWDER, FOR SOLUTION ORAL DAILY PRN
Status: DISCONTINUED | OUTPATIENT
Start: 2024-03-25 | End: 2024-03-30 | Stop reason: HOSPADM

## 2024-03-25 RX ORDER — DIAZEPAM 5 MG/1
5 TABLET ORAL
Status: DISCONTINUED | OUTPATIENT
Start: 2024-03-25 | End: 2024-03-30 | Stop reason: HOSPADM

## 2024-03-25 RX ORDER — ACETAMINOPHEN 650 MG/1
650 SUPPOSITORY RECTAL EVERY 6 HOURS PRN
Status: DISCONTINUED | OUTPATIENT
Start: 2024-03-25 | End: 2024-03-30 | Stop reason: HOSPADM

## 2024-03-25 RX ORDER — GAUZE BANDAGE 2" X 2"
100 BANDAGE TOPICAL DAILY
Status: DISCONTINUED | OUTPATIENT
Start: 2024-03-25 | End: 2024-03-30 | Stop reason: HOSPADM

## 2024-03-25 RX ORDER — AMPICILLIN TRIHYDRATE 250 MG
1 CAPSULE ORAL DAILY
COMMUNITY

## 2024-03-25 RX ORDER — DIAZEPAM 5 MG/1
20 TABLET ORAL
Status: DISCONTINUED | OUTPATIENT
Start: 2024-03-25 | End: 2024-03-30 | Stop reason: HOSPADM

## 2024-03-25 RX ORDER — ENOXAPARIN SODIUM 100 MG/ML
40 INJECTION SUBCUTANEOUS DAILY
Status: DISCONTINUED | OUTPATIENT
Start: 2024-03-25 | End: 2024-03-30 | Stop reason: HOSPADM

## 2024-03-25 RX ORDER — AMLODIPINE BESYLATE 5 MG/1
10 TABLET ORAL DAILY
Status: DISCONTINUED | OUTPATIENT
Start: 2024-03-25 | End: 2024-03-30 | Stop reason: HOSPADM

## 2024-03-25 RX ORDER — ALBUTEROL SULFATE 2.5 MG/3ML
2.5 SOLUTION RESPIRATORY (INHALATION) EVERY 6 HOURS PRN
Status: DISCONTINUED | OUTPATIENT
Start: 2024-03-25 | End: 2024-03-30 | Stop reason: HOSPADM

## 2024-03-25 RX ORDER — HYDROCHLOROTHIAZIDE 25 MG/1
12.5 TABLET ORAL DAILY
Status: DISCONTINUED | OUTPATIENT
Start: 2024-03-25 | End: 2024-03-30 | Stop reason: HOSPADM

## 2024-03-25 RX ADMIN — SODIUM CHLORIDE: 9 INJECTION, SOLUTION INTRAVENOUS at 20:23

## 2024-03-25 RX ADMIN — GADOTERIDOL 19 ML: 279.3 INJECTION, SOLUTION INTRAVENOUS at 14:49

## 2024-03-25 RX ADMIN — HYDRALAZINE HYDROCHLORIDE 10 MG: 20 INJECTION INTRAMUSCULAR; INTRAVENOUS at 16:15

## 2024-03-25 RX ADMIN — IPRATROPIUM BROMIDE 0.5 MG: 0.5 SOLUTION RESPIRATORY (INHALATION) at 20:09

## 2024-03-25 RX ADMIN — SODIUM CHLORIDE, PRESERVATIVE FREE 10 ML: 5 INJECTION INTRAVENOUS at 20:24

## 2024-03-25 RX ADMIN — Medication 2500 MG: at 13:34

## 2024-03-25 RX ADMIN — DIAZEPAM 10 MG: 5 TABLET ORAL at 18:26

## 2024-03-25 RX ADMIN — ARFORMOTEROL TARTRATE 15 MCG: 15 SOLUTION RESPIRATORY (INHALATION) at 20:09

## 2024-03-25 RX ADMIN — ENOXAPARIN SODIUM 40 MG: 100 INJECTION SUBCUTANEOUS at 18:26

## 2024-03-25 RX ADMIN — ROSUVASTATIN CALCIUM 20 MG: 5 TABLET, COATED ORAL at 20:25

## 2024-03-25 RX ADMIN — IPRATROPIUM BROMIDE 0.5 MG: 0.5 SOLUTION RESPIRATORY (INHALATION) at 15:33

## 2024-03-25 RX ADMIN — FOLIC ACID 1 MG: 1 TABLET ORAL at 18:26

## 2024-03-25 RX ADMIN — PIPERACILLIN AND TAZOBACTAM 3375 MG: 3; .375 INJECTION, POWDER, LYOPHILIZED, FOR SOLUTION INTRAVENOUS at 20:33

## 2024-03-25 RX ADMIN — PIPERACILLIN SODIUM AND TAZOBACTAM SODIUM 4500 MG: 4; .5 INJECTION, POWDER, LYOPHILIZED, FOR SOLUTION INTRAVENOUS at 13:08

## 2024-03-25 ASSESSMENT — PAIN DESCRIPTION - ORIENTATION: ORIENTATION: RIGHT

## 2024-03-25 ASSESSMENT — PAIN SCALES - GENERAL: PAINLEVEL_OUTOF10: 6

## 2024-03-25 ASSESSMENT — PAIN DESCRIPTION - LOCATION: LOCATION: FOOT

## 2024-03-25 NOTE — ED PROVIDER NOTES
concussion, less likely intracranial bleeding as trauma occurred 4 days ago and patient has no severe symptoms.    Will check basic lab work to rule out severe electrolyte derangements or anemia.   Will check ct head to rule out acute intracranial process  Will check XR of the R foot to assess for osteo  Will send inflammatory markers  Will check duplex of the RLE to r/o DVT    Problems Addressed:  Other acute osteomyelitis of right foot (HCC): acute illness or injury    Amount and/or Complexity of Data Reviewed  Labs: ordered. Decision-making details documented in ED Course.  Radiology: ordered. Decision-making details documented in ED Course.    Risk  OTC drugs.  Prescription drug management.  Decision regarding hospitalization.          CLINICAL MANAGEMENT TOOLS:  Not Applicable      ED Course as of 03/25/24 1435   Mon Mar 25, 2024   1253 XR concerning for osteomyelitis.  Will initiate antibiotics, MRI ordered, podiatry consult placed, discussed with hospitalist, Dr. Mojica, for admission [DOUG]      ED Course User Index  [DOUG] Alessia Bruce MD       Lab work unremarkable.  CT head without acute findings. Inflammatory markers pending at time of sign over to hospitalist service      FINAL IMPRESSION     1. Other acute osteomyelitis of right foot (HCC)          DISPOSITION/PLAN   Ranulfo Hernandez's  results have been reviewed with him.  He has been counseled regarding his diagnosis, treatment, and plan.      CLINICAL IMPRESSION    Admit Note: Pt is being admitted by Dr. Mojica. The results of their tests and reason(s) for their admission have been discussed with pt and/or available family. They convey agreement and understanding for the need to be admitted and for the admission diagnosis.           I am the Primary Clinician of Record.   Alessia Bruce MD (electronically signed)    (Please note that parts of this dictation were completed with voice recognition software. Quite often unanticipated  grammatical, syntax, homophones, and other interpretive errors are inadvertently transcribed by the computer software. Please disregards these errors. Please excuse any errors that have escaped final proofreading.)         Alessia Bruce MD  03/26/24 0889

## 2024-03-25 NOTE — PROGRESS NOTES
Pharmacy Antimicrobial Kinetic Dosing    Indication for Antimicrobials: SSTI     Current Regimen of Each Antimicrobial:  Vancomycin pharmacy consult; Start Date 3/25/24; Day # 1  Zosyn 3.375gm q 8; Start 3/25; Day #1    Previous Antimicrobial Therapy:  Vancomycin 2500mg x 1 in ED   Zosyn 4.5gm x 1 in ED      Goal Level: Vancomycin -600    Date Dose & Interval Measured (mcg/mL) Predicted AUC                       Significant Cultures:       Labs:  Recent Labs     Units 24  1035   CREATININE MG/DL 0.82   BUN MG/DL 8   WBC K/uL 8.8     Temp (24hrs), Av.1 °F (36.7 °C), Min:98.1 °F (36.7 °C), Max:98.1 °F (36.7 °C)      Conditions for Dosing Consideration: None    Creatinine Clearance (mL/min): Estimated Creatinine Clearance: 103 mL/min (based on SCr of 0.82 mg/dL).       Impression/Plan:   Vancomycin 1250mg q 12 for projected   Zosyn  Daily BMP per protocol  Antimicrobial stop date 7 days      Pharmacy will follow daily and adjust medications as appropriate for renal function and/or serum levels.    Thank you,  Loni Hagan [facility]

## 2024-03-25 NOTE — PROGRESS NOTES
MRI PENDING    Completion of MRI Screening Sheet    Fax to [phone] when completed    Please call [phone]  When this is done    Thank You

## 2024-03-25 NOTE — H&P
ml/min/1.73m2    Calcium 10.0 8.5 - 10.1 MG/DL    Total Bilirubin 0.8 0.2 - 1.0 MG/DL    ALT 29 12 - 78 U/L    AST 23 15 - 37 U/L    Alk Phosphatase 138 (H) 45 - 117 U/L    Total Protein 8.1 6.4 - 8.2 g/dL    Albumin 3.6 3.5 - 5.0 g/dL    Globulin 4.5 (H) 2.0 - 4.0 g/dL    Albumin/Globulin Ratio 0.8 (L) 1.1 - 2.2     Sedimentation Rate    Collection Time: 03/25/24 10:35 AM   Result Value Ref Range    Sed Rate, Automated 27 (H) 0 - 20 mm/hr   Vascular duplex lower extremity venous right    Collection Time: 03/25/24 11:10 AM   Result Value Ref Range    Body Surface Area 2.28 m2         XR FOOT RIGHT (MIN 3 VIEWS)    Result Date: 3/25/2024  EXAM: XR FOOT RIGHT (MIN 3 VIEWS) INDICATION: wound to great toe, assess for osteo. COMPARISON: None. FINDINGS: Three views of the right foot demonstrate fragmented erosion of the great toe distal phalanx tuft compatible with osteomyelitis. There is no soft tissue gas or foreign body. There is mild osteoarthritis.     Compatible with osteoarthritis of the great toe distal phalanx.    Vascular duplex lower extremity venous right    Result Date: 3/25/2024    No evidence of deep vein thrombosis in the right lower extremity. No evidence of deep vein or superficial vein thrombosis in the right lower extremity. Vessels demonstrate normal compressibility, color filling, and phasic and spontaneous flow.     CT Head W/O Contrast    Result Date: 3/25/2024  INDICATION: fall 4 days ago EXAM: CT HEAD without contrast. TECHNIQUE: Unenhanced CT Head is performed. CT dose reduction was achieved through use of a standardized protocol tailored for this examination and automatic exposure control for dose modulation. FINDINGS: No acute infarct is seen. There is no apparent mass on unenhanced imaging. There is no bleed, shift, obstructive hydrocephalus or significant extra-axial fluid collection. Bone windows are unremarkable.     No acute intracranial abnormality.

## 2024-03-25 NOTE — PROGRESS NOTES
X-rays reviewed of the patient, right great toe, distal tip destruction.    Awaiting MRI findings, helping for incision and drainage with bone biopsy great toe right foot.

## 2024-03-25 NOTE — PROGRESS NOTES
Patient may eat today, we'll get him n.p.o.after midnight to do the case tomorrow -for his distal great toe showing distal phalanx osteomyelitis.

## 2024-03-26 LAB
ANION GAP SERPL CALC-SCNC: 4 MMOL/L (ref 5–15)
BASOPHILS # BLD: 0.1 K/UL (ref 0–0.1)
BASOPHILS NFR BLD: 1 % (ref 0–1)
BUN SERPL-MCNC: 12 MG/DL (ref 6–20)
BUN/CREAT SERPL: 14 (ref 12–20)
CALCIUM SERPL-MCNC: 9.3 MG/DL (ref 8.5–10.1)
CHLORIDE SERPL-SCNC: 106 MMOL/L (ref 97–108)
CO2 SERPL-SCNC: 29 MMOL/L (ref 21–32)
CREAT SERPL-MCNC: 0.83 MG/DL (ref 0.7–1.3)
DIFFERENTIAL METHOD BLD: NORMAL
EOSINOPHIL # BLD: 0.3 K/UL (ref 0–0.4)
EOSINOPHIL NFR BLD: 4 % (ref 0–7)
ERYTHROCYTE [DISTWIDTH] IN BLOOD BY AUTOMATED COUNT: 13.6 % (ref 11.5–14.5)
GLUCOSE SERPL-MCNC: 104 MG/DL (ref 65–100)
HCT VFR BLD AUTO: 44.4 % (ref 36.6–50.3)
HGB BLD-MCNC: 14.4 G/DL (ref 12.1–17)
IMM GRANULOCYTES # BLD AUTO: 0 K/UL (ref 0–0.04)
IMM GRANULOCYTES NFR BLD AUTO: 0 % (ref 0–0.5)
LYMPHOCYTES # BLD: 1.5 K/UL (ref 0.8–3.5)
LYMPHOCYTES NFR BLD: 23 % (ref 12–49)
MCH RBC QN AUTO: 32.1 PG (ref 26–34)
MCHC RBC AUTO-ENTMCNC: 32.4 G/DL (ref 30–36.5)
MCV RBC AUTO: 98.9 FL (ref 80–99)
MONOCYTES # BLD: 0.5 K/UL (ref 0–1)
MONOCYTES NFR BLD: 9 % (ref 5–13)
NEUTS SEG # BLD: 4 K/UL (ref 1.8–8)
NEUTS SEG NFR BLD: 63 % (ref 32–75)
NRBC # BLD: 0 K/UL (ref 0–0.01)
NRBC BLD-RTO: 0 PER 100 WBC
PLATELET # BLD AUTO: 240 K/UL (ref 150–400)
PMV BLD AUTO: 9.7 FL (ref 8.9–12.9)
POTASSIUM SERPL-SCNC: 3.8 MMOL/L (ref 3.5–5.1)
RBC # BLD AUTO: 4.49 M/UL (ref 4.1–5.7)
SODIUM SERPL-SCNC: 139 MMOL/L (ref 136–145)
WBC # BLD AUTO: 6.4 K/UL (ref 4.1–11.1)

## 2024-03-26 PROCEDURE — 2580000003 HC RX 258: Performed by: STUDENT IN AN ORGANIZED HEALTH CARE EDUCATION/TRAINING PROGRAM

## 2024-03-26 PROCEDURE — 80048 BASIC METABOLIC PNL TOTAL CA: CPT

## 2024-03-26 PROCEDURE — 6370000000 HC RX 637 (ALT 250 FOR IP): Performed by: STUDENT IN AN ORGANIZED HEALTH CARE EDUCATION/TRAINING PROGRAM

## 2024-03-26 PROCEDURE — 94761 N-INVAS EAR/PLS OXIMETRY MLT: CPT

## 2024-03-26 PROCEDURE — 6360000002 HC RX W HCPCS: Performed by: STUDENT IN AN ORGANIZED HEALTH CARE EDUCATION/TRAINING PROGRAM

## 2024-03-26 PROCEDURE — 36415 COLL VENOUS BLD VENIPUNCTURE: CPT

## 2024-03-26 PROCEDURE — 1100000003 HC PRIVATE W/ TELEMETRY

## 2024-03-26 PROCEDURE — 85025 COMPLETE CBC W/AUTO DIFF WBC: CPT

## 2024-03-26 PROCEDURE — 94640 AIRWAY INHALATION TREATMENT: CPT

## 2024-03-26 RX ORDER — ARFORMOTEROL TARTRATE 15 UG/2ML
15 SOLUTION RESPIRATORY (INHALATION)
Status: DISCONTINUED | OUTPATIENT
Start: 2024-03-26 | End: 2024-03-30 | Stop reason: HOSPADM

## 2024-03-26 RX ADMIN — VANCOMYCIN HYDROCHLORIDE 1250 MG: 10 INJECTION, POWDER, LYOPHILIZED, FOR SOLUTION INTRAVENOUS at 14:13

## 2024-03-26 RX ADMIN — PIPERACILLIN AND TAZOBACTAM 3375 MG: 3; .375 INJECTION, POWDER, LYOPHILIZED, FOR SOLUTION INTRAVENOUS at 04:01

## 2024-03-26 RX ADMIN — VANCOMYCIN HYDROCHLORIDE 1250 MG: 10 INJECTION, POWDER, LYOPHILIZED, FOR SOLUTION INTRAVENOUS at 23:33

## 2024-03-26 RX ADMIN — ENOXAPARIN SODIUM 40 MG: 100 INJECTION SUBCUTANEOUS at 09:42

## 2024-03-26 RX ADMIN — IPRATROPIUM BROMIDE 0.5 MG: 0.5 SOLUTION RESPIRATORY (INHALATION) at 07:35

## 2024-03-26 RX ADMIN — FOLIC ACID 1 MG: 1 TABLET ORAL at 09:41

## 2024-03-26 RX ADMIN — ARFORMOTEROL TARTRATE 15 MCG: 15 SOLUTION RESPIRATORY (INHALATION) at 07:40

## 2024-03-26 RX ADMIN — SODIUM CHLORIDE, PRESERVATIVE FREE 10 ML: 5 INJECTION INTRAVENOUS at 23:36

## 2024-03-26 RX ADMIN — ARFORMOTEROL TARTRATE 15 MCG: 15 SOLUTION RESPIRATORY (INHALATION) at 20:46

## 2024-03-26 RX ADMIN — IPRATROPIUM BROMIDE 0.5 MG: 0.5 SOLUTION RESPIRATORY (INHALATION) at 20:46

## 2024-03-26 RX ADMIN — SODIUM CHLORIDE, PRESERVATIVE FREE 10 ML: 5 INJECTION INTRAVENOUS at 09:44

## 2024-03-26 RX ADMIN — PIPERACILLIN AND TAZOBACTAM 3375 MG: 3; .375 INJECTION, POWDER, LYOPHILIZED, FOR SOLUTION INTRAVENOUS at 12:33

## 2024-03-26 RX ADMIN — ROSUVASTATIN CALCIUM 20 MG: 5 TABLET, COATED ORAL at 23:30

## 2024-03-26 RX ADMIN — DIAZEPAM 15 MG: 5 INJECTION, SOLUTION INTRAMUSCULAR; INTRAVENOUS at 09:42

## 2024-03-26 RX ADMIN — VANCOMYCIN HYDROCHLORIDE 1250 MG: 10 INJECTION, POWDER, LYOPHILIZED, FOR SOLUTION INTRAVENOUS at 02:03

## 2024-03-26 RX ADMIN — SODIUM CHLORIDE: 9 INJECTION, SOLUTION INTRAVENOUS at 12:32

## 2024-03-26 RX ADMIN — DIAZEPAM 15 MG: 5 INJECTION, SOLUTION INTRAMUSCULAR; INTRAVENOUS at 18:03

## 2024-03-26 RX ADMIN — HYDROCHLOROTHIAZIDE 12.5 MG: 25 TABLET ORAL at 09:41

## 2024-03-26 RX ADMIN — Medication 100 MG: at 09:41

## 2024-03-26 RX ADMIN — AMLODIPINE BESYLATE 10 MG: 5 TABLET ORAL at 09:41

## 2024-03-26 ASSESSMENT — PAIN SCALES - GENERAL
PAINLEVEL_OUTOF10: 0
PAINLEVEL_OUTOF10: 0

## 2024-03-26 NOTE — CONSULTS
Patient seen today, discussed the x-ray and MRI findings. Seems to be isolated to the distal phalanx.    Clinically looks like closed wound/ulcer distal tip right great toe. No ascending cellulitis. No lymphatic induration or tenderness of the lower extremity right side.          Past Medical History:   Diagnosis Date    Cancer (HCC)       PROSTATE    Hyperthyroidism 5/15/2019    Psychiatric disorder       ANXIETY (SITUATIONAL)            Past Surgical History         Past Surgical History:   Procedure Laterality Date    GI   2003     18 \" COLON RESECTION (DIVERTICULITIS)    AK UNLISTED PROCEDURE ABDOMEN PERITONEUM & OMENTUM         UMBILICAL HERNIA    UROLOGICAL SURGERY         CYSTO/BIOPSY PROSTATE            Social History            Tobacco Use    Smoking status: Every Day       Current packs/day: 1.00       Types: Cigarettes    Smokeless tobacco: Never   Substance Use Topics    Alcohol use: Yes       Alcohol/week: 5.0 standard drinks of alcohol         Family History         Family History   Problem Relation Age of Onset    Heart Disease Mother      Cancer Father           PROSTATE    Diabetes Maternal Grandmother      Anesth Problems Neg Hx      Cancer Mother           LUNG            No Known Allergies      Home Medications[]Expand by Default           Prior to Admission medications    Medication Sig Start Date End Date Taking? Authorizing Provider   hydroCHLOROthiazide (HYDRODIURIL) 12.5 MG tablet TAKE 1 TABLET IN THE MORNING ORALLY ONCE A DAY FOR BLOOD PRESSURE 7/24/23     Modesto Monroe MD   amLODIPine (NORVASC) 10 MG tablet TAKE 1 TABLET EVERY DAY FOR BLOOD PRESSURE 7/24/23     Modesto Monroe MD   rosuvastatin (CRESTOR) 20 MG tablet TAKE 1 TABLET EVERY DAY TO LOWER CHOLESTEROL 7/24/23     Modesto Monroe MD   albuterol sulfate HFA (PROVENTIL;VENTOLIN;PROAIR) 108 (90 Base) MCG/ACT inhaler Inhale into the lungs       Automatic Reconciliation, Ar   amiodarone (CORDARONE) 200 MG tablet Take by

## 2024-03-26 NOTE — PROGRESS NOTES
Comprehensive Nutrition Assessment    Type and Reason for Visit:  Initial, Positive Nutrition Screen    Nutrition Recommendations/Plan:   Continue current diet, allow double portions  Please document % meals and supplements consumed in flowsheet I/O's under intake      Malnutrition Assessment:  Malnutrition Status:  At risk for malnutrition (Comment) (03/26/24 1401)        Nutrition Assessment:     Chart reviewed for MST. Pt medically noted for chronic wound with OM on R great toe, cellulitis of R foot, ETOH abuse, HTN, HLD, scoliosis. Pt reports a consistently good appetite here and PTA. He had consumed 100% of lunch per visual. No weight hx in the EMR. Pt reports gradual weight loss about 40# over the last 2 years or so 2/2 being very active on his feet at work all day. He only eats one meal a day at dinner, but it is one very large meal to make up his missed calories throughout the day. Will continue monitoring.     Patient Vitals for the past 120 hrs:   PO Meals Eaten (%)   03/26/24 1405 76 - 100%     Wt Readings from Last 5 Encounters:   03/25/24 97.2 kg (214 lb 4.6 oz)   ]    Nutrition Related Findings:    Labs: reviewed.   Meds: folic acid, hydrodiuril, zosyn, crestor, thiamine, vancomycin.   Edema: 1+ pitting RLE.   BM PTA.   Wound Type: None       Current Nutrition Intake & Therapies:    Average Meal Intake: %  Average Supplements Intake: None Ordered  Diet NPO Exceptions are: Sips of Water with Meds  ADULT DIET; Regular; Low Fat/Low Chol/High Fiber/ALEXIS; Double Protein Portions    Anthropometric Measures:  Height: 193 cm (6' 4\")  Ideal Body Weight (IBW): 202 lbs (92 kg)       Current Body Weight: 97.2 kg (214 lb 4.6 oz), 106.1 % IBW. Weight Source: Standing Scale  Current BMI (kg/m2): 26.1        Weight Adjustment For: No Adjustment                 BMI Categories: Overweight (BMI 25.0-29.9)    Estimated Daily Nutrient Needs:  Energy Requirements Based On: Formula  Weight Used for Energy Requirements:

## 2024-03-26 NOTE — PROGRESS NOTES
End of Shift Note    Bedside shift change report given to Precious NARANJO (oncoming nurse) by Martinez Turcios RN (offgoing nurse).  Report included the following information SBAR, Kardex, Intake/Output, MAR, Recent Results, and Cardiac Rhythm sinus rhythm    Shift worked:  7p-7a     Shift summary and any significant changes:     Patient tolerated care. Scheduled meds given. Patient was able to rest over shift. CIWA was 4 at the beginning of the shift. After sleeping CIWA score was 0. Labs drawn.       Expected LOS: 2  Actual LOS: 1      Martinez Turcios RN

## 2024-03-26 NOTE — CARE COORDINATION
Care Management Initial Assessment       RUR: 8%  Readmission? No  1st IM letter given? Yes - 3/25/24  1st  letter given: No        CM introduce self, explain role and confirmed demographics with pt.    Pt lives alone in a two story office building with five steps to enter and 13 steps to get to the second floor.     No DME reported.    No hx of home health, SNF or inpatient rehab.    Pt uses [facility] in [place].    At the time of d/c pt's family will transport.    CM will follow and assist with d/c planning.        03/26/24 1440   Service Assessment   Patient Orientation Alert and Oriented   Cognition Alert   History Provided By Patient   Primary Caregiver Self   Support Systems Spouse/Significant Other;Parent;Family Members;Friends/Neighbors   Patient's Healthcare Decision Maker is: Named in Scanned ACP Document   PCP Verified by CM Yes   Last Visit to PCP   (Pt stated he has a new PCP Martha Quach on [address])   Prior Functional Level Independent in ADLs/IADLs   Current Functional Level Independent in ADLs/IADLs   Can patient return to prior living arrangement Yes   Family able to assist with home care needs: No   Would you like for me to discuss the discharge plan with any other family members/significant others, and if so, who? No   Financial Resources Medicare   Community Resources None   Social/Functional History   Lives With Alone   Type of Home Apartment   Home Equipment None   Active  Yes   Occupation Part time employment   Discharge Planning   Type of Residence House   Current Services Prior To Admission None   Patient expects to be discharged to: Apartment     Advance Care Planning     General Advance Care Planning (ACP) Conversation    Date of Conversation: 3/26/2024  Conducted with: Patient with Decision Making Capacity    Healthcare Decision Maker:  No healthcare decision makers have been documented.  Click here to complete HealthCare Decision Makers including

## 2024-03-26 NOTE — PROGRESS NOTES
Hospitalist Progress Note    NAME:   Ranulfo Hernandez   : 1955   MRN: [id number]     Date/Time: 3/26/2024 8:48 AM  Patient PCP: Unknown, Provider, DO    Estimated discharge date: >48 hours  Barriers: planned I&D 3/27, cultures, clinical stability      Assessment / Plan:    Possible osteomyelitis distal phalanx right great toe  Cellulitis of the right foot  History of calluses on the plantar aspects of bilateral feet  Chronic wound on the great toe  Possible history of neuropathy  R foot X-ray 3/25: right foot reviewed osteoarthritis of great toe distal phalanx  MRI of the right foot 3/25: revealed osteomyelitis distal phalanx of the great toe with abscess and sinus tract, developing small subcentimeter abscess in the plantar soft tissues of the distal phalanx of the great toe  Vascular duplex lower extremity venous 3/25:  no evidence of deep vein thrombosis  BC paired 3/25: NGTD  ECG: NSR, non specific ST wave abnormality  - Patient received a dose of Zosyn and vancomycin in the ED, continue the same  - Podiatry following, greatly appreciate expertise  - plan for procedure tomorrow, NPO p MN tonight  - Pain management as needed with Tylenol  - Nonweightbearing, wound care as per nursing     RCRI 0, 3.9% risk of cardiac death within 30 days of surgery.  Patient is at minimal risk of having adverse events postsurgery.     History of head injury with laceration and forehead  CT head without contrast: no acute abnormality  - Wound care as per nursing     Active smoker  History of alcohol use-at risk for withdrawal  - Patient on nicotine patch-continue for now  - CIWA protocol per shift-diazepam ordered for any alcohol withdrawal symptoms  - Ordered multivitamins     Hypertension  - continue Norvasc, HCTZ    Hyperlipidemia  - continue Crestor    Scoliosis   - supportive treatment        Medical Decision Making:   I personally reviewed labs: BMP, CBC, BC  I personally reviewed imaging: MRI R foot  I

## 2024-03-27 ENCOUNTER — ANESTHESIA (OUTPATIENT)
Facility: HOSPITAL | Age: 69
End: 2024-03-27
Payer: MEDICARE

## 2024-03-27 ENCOUNTER — ANESTHESIA EVENT (OUTPATIENT)
Facility: HOSPITAL | Age: 69
End: 2024-03-27
Payer: MEDICARE

## 2024-03-27 LAB
ANION GAP SERPL CALC-SCNC: 5 MMOL/L (ref 5–15)
BASOPHILS # BLD: 0.1 K/UL (ref 0–0.1)
BASOPHILS NFR BLD: 1 % (ref 0–1)
BUN SERPL-MCNC: 11 MG/DL (ref 6–20)
BUN/CREAT SERPL: 13 (ref 12–20)
CALCIUM SERPL-MCNC: 9.1 MG/DL (ref 8.5–10.1)
CHLORIDE SERPL-SCNC: 110 MMOL/L (ref 97–108)
CO2 SERPL-SCNC: 25 MMOL/L (ref 21–32)
CREAT SERPL-MCNC: 0.82 MG/DL (ref 0.7–1.3)
DIFFERENTIAL METHOD BLD: ABNORMAL
EKG ATRIAL RATE: 72 BPM
EKG DIAGNOSIS: NORMAL
EKG P AXIS: 7 DEGREES
EKG P-R INTERVAL: 146 MS
EKG Q-T INTERVAL: 386 MS
EKG QRS DURATION: 84 MS
EKG QTC CALCULATION (BAZETT): 422 MS
EKG R AXIS: 36 DEGREES
EKG T AXIS: 39 DEGREES
EKG VENTRICULAR RATE: 72 BPM
EOSINOPHIL # BLD: 0.2 K/UL (ref 0–0.4)
EOSINOPHIL NFR BLD: 3 % (ref 0–7)
ERYTHROCYTE [DISTWIDTH] IN BLOOD BY AUTOMATED COUNT: 13.7 % (ref 11.5–14.5)
GLUCOSE SERPL-MCNC: 129 MG/DL (ref 65–100)
HCT VFR BLD AUTO: 45 % (ref 36.6–50.3)
HGB BLD-MCNC: 14.5 G/DL (ref 12.1–17)
IMM GRANULOCYTES # BLD AUTO: 0 K/UL (ref 0–0.04)
IMM GRANULOCYTES NFR BLD AUTO: 1 % (ref 0–0.5)
LYMPHOCYTES # BLD: 1.2 K/UL (ref 0.8–3.5)
LYMPHOCYTES NFR BLD: 20 % (ref 12–49)
MCH RBC QN AUTO: 31.9 PG (ref 26–34)
MCHC RBC AUTO-ENTMCNC: 32.2 G/DL (ref 30–36.5)
MCV RBC AUTO: 98.9 FL (ref 80–99)
MONOCYTES # BLD: 0.6 K/UL (ref 0–1)
MONOCYTES NFR BLD: 9 % (ref 5–13)
NEUTS SEG # BLD: 4.1 K/UL (ref 1.8–8)
NEUTS SEG NFR BLD: 66 % (ref 32–75)
NRBC # BLD: 0 K/UL (ref 0–0.01)
NRBC BLD-RTO: 0 PER 100 WBC
PLATELET # BLD AUTO: 248 K/UL (ref 150–400)
PMV BLD AUTO: 9.5 FL (ref 8.9–12.9)
POTASSIUM SERPL-SCNC: 4 MMOL/L (ref 3.5–5.1)
RBC # BLD AUTO: 4.55 M/UL (ref 4.1–5.7)
SODIUM SERPL-SCNC: 140 MMOL/L (ref 136–145)
VANCOMYCIN SERPL-MCNC: 8.8 UG/ML
WBC # BLD AUTO: 6.2 K/UL (ref 4.1–11.1)

## 2024-03-27 PROCEDURE — 80202 ASSAY OF VANCOMYCIN: CPT

## 2024-03-27 PROCEDURE — 3700000001 HC ADD 15 MINUTES (ANESTHESIA): Performed by: PODIATRIST

## 2024-03-27 PROCEDURE — 3700000000 HC ANESTHESIA ATTENDED CARE: Performed by: PODIATRIST

## 2024-03-27 PROCEDURE — 87205 SMEAR GRAM STAIN: CPT

## 2024-03-27 PROCEDURE — 6360000002 HC RX W HCPCS: Performed by: STUDENT IN AN ORGANIZED HEALTH CARE EDUCATION/TRAINING PROGRAM

## 2024-03-27 PROCEDURE — 6360000002 HC RX W HCPCS

## 2024-03-27 PROCEDURE — 6360000002 HC RX W HCPCS: Performed by: NURSE PRACTITIONER

## 2024-03-27 PROCEDURE — 0QBQ0ZX EXCISION OF RIGHT TOE PHALANX, OPEN APPROACH, DIAGNOSTIC: ICD-10-PCS | Performed by: PODIATRIST

## 2024-03-27 PROCEDURE — 2580000003 HC RX 258: Performed by: STUDENT IN AN ORGANIZED HEALTH CARE EDUCATION/TRAINING PROGRAM

## 2024-03-27 PROCEDURE — 85025 COMPLETE CBC W/AUTO DIFF WBC: CPT

## 2024-03-27 PROCEDURE — 88311 DECALCIFY TISSUE: CPT

## 2024-03-27 PROCEDURE — 0QBQ0ZZ EXCISION OF RIGHT TOE PHALANX, OPEN APPROACH: ICD-10-PCS | Performed by: PODIATRIST

## 2024-03-27 PROCEDURE — 80048 BASIC METABOLIC PNL TOTAL CA: CPT

## 2024-03-27 PROCEDURE — 7100000000 HC PACU RECOVERY - FIRST 15 MIN: Performed by: PODIATRIST

## 2024-03-27 PROCEDURE — 3600000002 HC SURGERY LEVEL 2 BASE: Performed by: PODIATRIST

## 2024-03-27 PROCEDURE — 94640 AIRWAY INHALATION TREATMENT: CPT

## 2024-03-27 PROCEDURE — 1100000003 HC PRIVATE W/ TELEMETRY

## 2024-03-27 PROCEDURE — 2709999900 HC NON-CHARGEABLE SUPPLY: Performed by: PODIATRIST

## 2024-03-27 PROCEDURE — 2580000003 HC RX 258

## 2024-03-27 PROCEDURE — 6360000002 HC RX W HCPCS: Performed by: PODIATRIST

## 2024-03-27 PROCEDURE — 87077 CULTURE AEROBIC IDENTIFY: CPT

## 2024-03-27 PROCEDURE — 94761 N-INVAS EAR/PLS OXIMETRY MLT: CPT

## 2024-03-27 PROCEDURE — 7100000001 HC PACU RECOVERY - ADDTL 15 MIN: Performed by: PODIATRIST

## 2024-03-27 PROCEDURE — 87186 SC STD MICRODIL/AGAR DIL: CPT

## 2024-03-27 PROCEDURE — 6370000000 HC RX 637 (ALT 250 FOR IP): Performed by: STUDENT IN AN ORGANIZED HEALTH CARE EDUCATION/TRAINING PROGRAM

## 2024-03-27 PROCEDURE — 87075 CULTR BACTERIA EXCEPT BLOOD: CPT

## 2024-03-27 PROCEDURE — 36415 COLL VENOUS BLD VENIPUNCTURE: CPT

## 2024-03-27 PROCEDURE — 3600000012 HC SURGERY LEVEL 2 ADDTL 15MIN: Performed by: PODIATRIST

## 2024-03-27 PROCEDURE — 87070 CULTURE OTHR SPECIMN AEROBIC: CPT

## 2024-03-27 PROCEDURE — 88307 TISSUE EXAM BY PATHOLOGIST: CPT

## 2024-03-27 PROCEDURE — 87147 CULTURE TYPE IMMUNOLOGIC: CPT

## 2024-03-27 RX ORDER — PROCHLORPERAZINE EDISYLATE 5 MG/ML
5 INJECTION INTRAMUSCULAR; INTRAVENOUS
Status: DISCONTINUED | OUTPATIENT
Start: 2024-03-27 | End: 2024-03-27 | Stop reason: HOSPADM

## 2024-03-27 RX ORDER — GLUCAGON 1 MG/ML
1 KIT INJECTION PRN
Status: DISCONTINUED | OUTPATIENT
Start: 2024-03-27 | End: 2024-03-27 | Stop reason: HOSPADM

## 2024-03-27 RX ORDER — ONDANSETRON 2 MG/ML
4 INJECTION INTRAMUSCULAR; INTRAVENOUS
Status: DISCONTINUED | OUTPATIENT
Start: 2024-03-27 | End: 2024-03-27 | Stop reason: HOSPADM

## 2024-03-27 RX ORDER — SODIUM CHLORIDE 0.9 % (FLUSH) 0.9 %
5-40 SYRINGE (ML) INJECTION PRN
Status: DISCONTINUED | OUTPATIENT
Start: 2024-03-27 | End: 2024-03-27 | Stop reason: HOSPADM

## 2024-03-27 RX ORDER — FENTANYL CITRATE 50 UG/ML
25 INJECTION, SOLUTION INTRAMUSCULAR; INTRAVENOUS EVERY 5 MIN PRN
Status: DISCONTINUED | OUTPATIENT
Start: 2024-03-27 | End: 2024-03-27 | Stop reason: HOSPADM

## 2024-03-27 RX ORDER — SODIUM CHLORIDE 0.9 % (FLUSH) 0.9 %
5-40 SYRINGE (ML) INJECTION EVERY 12 HOURS SCHEDULED
Status: DISCONTINUED | OUTPATIENT
Start: 2024-03-27 | End: 2024-03-27 | Stop reason: HOSPADM

## 2024-03-27 RX ORDER — BUPIVACAINE HYDROCHLORIDE 5 MG/ML
INJECTION, SOLUTION PERINEURAL PRN
Status: DISCONTINUED | OUTPATIENT
Start: 2024-03-27 | End: 2024-03-27 | Stop reason: ALTCHOICE

## 2024-03-27 RX ORDER — MORPHINE SULFATE 2 MG/ML
2 INJECTION, SOLUTION INTRAMUSCULAR; INTRAVENOUS EVERY 4 HOURS PRN
Status: DISPENSED | OUTPATIENT
Start: 2024-03-27 | End: 2024-03-28

## 2024-03-27 RX ORDER — SODIUM CHLORIDE 9 MG/ML
INJECTION, SOLUTION INTRAVENOUS PRN
Status: DISCONTINUED | OUTPATIENT
Start: 2024-03-27 | End: 2024-03-27 | Stop reason: HOSPADM

## 2024-03-27 RX ORDER — HYDROMORPHONE HYDROCHLORIDE 1 MG/ML
0.5 INJECTION, SOLUTION INTRAMUSCULAR; INTRAVENOUS; SUBCUTANEOUS EVERY 5 MIN PRN
Status: DISCONTINUED | OUTPATIENT
Start: 2024-03-27 | End: 2024-03-27 | Stop reason: HOSPADM

## 2024-03-27 RX ORDER — SODIUM CHLORIDE, SODIUM LACTATE, POTASSIUM CHLORIDE, CALCIUM CHLORIDE 600; 310; 30; 20 MG/100ML; MG/100ML; MG/100ML; MG/100ML
INJECTION, SOLUTION INTRAVENOUS CONTINUOUS PRN
Status: DISCONTINUED | OUTPATIENT
Start: 2024-03-27 | End: 2024-03-27 | Stop reason: SDUPTHER

## 2024-03-27 RX ORDER — DEXTROSE MONOHYDRATE 100 MG/ML
INJECTION, SOLUTION INTRAVENOUS CONTINUOUS PRN
Status: DISCONTINUED | OUTPATIENT
Start: 2024-03-27 | End: 2024-03-27 | Stop reason: HOSPADM

## 2024-03-27 RX ORDER — NALOXONE HYDROCHLORIDE 0.4 MG/ML
INJECTION, SOLUTION INTRAMUSCULAR; INTRAVENOUS; SUBCUTANEOUS PRN
Status: DISCONTINUED | OUTPATIENT
Start: 2024-03-27 | End: 2024-03-27 | Stop reason: HOSPADM

## 2024-03-27 RX ORDER — IPRATROPIUM BROMIDE AND ALBUTEROL SULFATE 2.5; .5 MG/3ML; MG/3ML
1 SOLUTION RESPIRATORY (INHALATION)
Status: DISCONTINUED | OUTPATIENT
Start: 2024-03-27 | End: 2024-03-27 | Stop reason: HOSPADM

## 2024-03-27 RX ADMIN — MORPHINE SULFATE 2 MG: 2 INJECTION, SOLUTION INTRAMUSCULAR; INTRAVENOUS at 22:39

## 2024-03-27 RX ADMIN — PROPOFOL 30 MG: 10 INJECTION, EMULSION INTRAVENOUS at 14:41

## 2024-03-27 RX ADMIN — PIPERACILLIN AND TAZOBACTAM 3375 MG: 3; .375 INJECTION, POWDER, LYOPHILIZED, FOR SOLUTION INTRAVENOUS at 14:28

## 2024-03-27 RX ADMIN — ARFORMOTEROL TARTRATE 15 MCG: 15 SOLUTION RESPIRATORY (INHALATION) at 08:34

## 2024-03-27 RX ADMIN — PROPOFOL 100 MCG/KG/MIN: 10 INJECTION, EMULSION INTRAVENOUS at 14:39

## 2024-03-27 RX ADMIN — ACETAMINOPHEN 650 MG: 325 TABLET ORAL at 18:21

## 2024-03-27 RX ADMIN — PIPERACILLIN AND TAZOBACTAM 3375 MG: 3; .375 INJECTION, POWDER, LYOPHILIZED, FOR SOLUTION INTRAVENOUS at 20:24

## 2024-03-27 RX ADMIN — SODIUM CHLORIDE, POTASSIUM CHLORIDE, SODIUM LACTATE AND CALCIUM CHLORIDE: 600; 310; 30; 20 INJECTION, SOLUTION INTRAVENOUS at 14:39

## 2024-03-27 RX ADMIN — ROSUVASTATIN CALCIUM 20 MG: 5 TABLET, COATED ORAL at 20:20

## 2024-03-27 RX ADMIN — PIPERACILLIN AND TAZOBACTAM 3375 MG: 3; .375 INJECTION, POWDER, LYOPHILIZED, FOR SOLUTION INTRAVENOUS at 00:41

## 2024-03-27 RX ADMIN — SODIUM CHLORIDE: 9 INJECTION, SOLUTION INTRAVENOUS at 20:19

## 2024-03-27 RX ADMIN — DIAZEPAM 5 MG: 5 TABLET ORAL at 20:22

## 2024-03-27 RX ADMIN — SODIUM CHLORIDE, PRESERVATIVE FREE 10 ML: 5 INJECTION INTRAVENOUS at 20:20

## 2024-03-27 RX ADMIN — IPRATROPIUM BROMIDE 0.5 MG: 0.5 SOLUTION RESPIRATORY (INHALATION) at 08:29

## 2024-03-27 RX ADMIN — HYDROCHLOROTHIAZIDE 12.5 MG: 25 TABLET ORAL at 08:40

## 2024-03-27 RX ADMIN — FOLIC ACID 1 MG: 1 TABLET ORAL at 08:40

## 2024-03-27 RX ADMIN — IPRATROPIUM BROMIDE 0.5 MG: 0.5 SOLUTION RESPIRATORY (INHALATION) at 19:45

## 2024-03-27 RX ADMIN — Medication 100 MG: at 08:39

## 2024-03-27 RX ADMIN — DIAZEPAM 10 MG: 5 INJECTION, SOLUTION INTRAMUSCULAR; INTRAVENOUS at 08:41

## 2024-03-27 RX ADMIN — ARFORMOTEROL TARTRATE 15 MCG: 15 SOLUTION RESPIRATORY (INHALATION) at 19:50

## 2024-03-27 RX ADMIN — PROPOFOL 50 MG: 10 INJECTION, EMULSION INTRAVENOUS at 14:44

## 2024-03-27 RX ADMIN — VANCOMYCIN HYDROCHLORIDE 1250 MG: 10 INJECTION, POWDER, LYOPHILIZED, FOR SOLUTION INTRAVENOUS at 14:40

## 2024-03-27 RX ADMIN — SODIUM CHLORIDE, PRESERVATIVE FREE 10 ML: 5 INJECTION INTRAVENOUS at 08:41

## 2024-03-27 RX ADMIN — AMLODIPINE BESYLATE 10 MG: 5 TABLET ORAL at 08:40

## 2024-03-27 ASSESSMENT — PAIN DESCRIPTION - ORIENTATION
ORIENTATION: RIGHT
ORIENTATION: RIGHT;LEFT
ORIENTATION: RIGHT

## 2024-03-27 ASSESSMENT — LIFESTYLE VARIABLES: SMOKING_STATUS: 1

## 2024-03-27 ASSESSMENT — PAIN DESCRIPTION - LOCATION
LOCATION: FOOT;TOE (COMMENT WHICH ONE)

## 2024-03-27 ASSESSMENT — PAIN DESCRIPTION - DESCRIPTORS
DESCRIPTORS: ACHING
DESCRIPTORS: ACHING

## 2024-03-27 ASSESSMENT — PAIN SCALES - GENERAL
PAINLEVEL_OUTOF10: 10
PAINLEVEL_OUTOF10: 0
PAINLEVEL_OUTOF10: 8
PAINLEVEL_OUTOF10: 0
PAINLEVEL_OUTOF10: 6
PAINLEVEL_OUTOF10: 0
PAINLEVEL_OUTOF10: 6
PAINLEVEL_OUTOF10: 10

## 2024-03-27 ASSESSMENT — PAIN - FUNCTIONAL ASSESSMENT: PAIN_FUNCTIONAL_ASSESSMENT: PREVENTS OR INTERFERES SOME ACTIVE ACTIVITIES AND ADLS

## 2024-03-27 NOTE — PROGRESS NOTES
End of Shift Note    Bedside shift change report given to JOSE A Westfall (oncoming nurse) by Stefani Youngblood RN (offgoing nurse).  Report included the following information SBAR, Kardex, Intake/Output, and MAR    Shift worked:  [id number]     Shift summary and any significant changes:     Patient tolerated care fairly well, no complaints of pain, did complain of mil anxiety, 2 doses of PRN valium provided.  All scheduled meds, pt education, and frequent rounding provided.  Pt ambulated with 1 assist to the bathroom.     Concerns for physician to address:       Zone phone for oncoming shift:            Stefani Youngblood RN

## 2024-03-27 NOTE — PROGRESS NOTES
Hospitalist Progress Note    NAME:   Ranulfo Hernandez   : 1955   MRN: [id number]     Date/Time: 3/27/2024 4:34 PM  Patient PCP: Unknown, Provider, DO    Estimated discharge date: >48 hours  Barriers: cultures, clinical stability, podiatry clearance      Assessment / Plan:    Possible osteomyelitis distal phalanx right great toe  Cellulitis of the right foot  History of calluses on the plantar aspects of bilateral feet  Chronic wound on the great toe  Possible history of neuropathy  R foot X-ray 3/25: right foot reviewed osteoarthritis of great toe distal phalanx  MRI of the right foot 3/25: revealed osteomyelitis distal phalanx of the great toe with abscess and sinus tract, developing small subcentimeter abscess in the plantar soft tissues of the distal phalanx of the great toe  Vascular duplex lower extremity venous 3/25:  no evidence of deep vein thrombosis  BC paired 3/25: NGTD  ECG: NSR, non specific ST wave abnormality  - Patient received a dose of Zosyn and vancomycin in the ED, continue the same  - Podiatry following, plan for I&D today at 1PM  - Pain management as needed with Tylenol  - Nonweightbearing, wound care as per nursing     RCRI 0, 3.9% risk of cardiac death within 30 days of surgery.  Patient is at minimal risk of having adverse events postsurgery.     History of head injury with laceration and forehead  CT head without contrast: no acute abnormality  - Wound care as per nursing     Active smoker  History of alcohol use-at risk for withdrawal  - Patient on nicotine patch-continue for now  - CIWA protocol per shift-diazepam ordered for any alcohol withdrawal symptoms  - Ordered multivitamins     Hypertension  - continue Norvasc, HCTZ     Hyperlipidemia  - continue Crestor     Scoliosis   - supportive treatment      Medical Decision Making:   I personally reviewed labs: BMP, CBC  I personally reviewed imaging: none  I personally reviewed EKG: NSR  Toxic drug monitoring: Arnaud  10.0 9.3 9.1   LABALBU 3.6  --   --    BILITOT 0.8  --   --    AST 23  --   --    ALT 29  --   --        Signed: PAOLO Parekh NP

## 2024-03-27 NOTE — ANESTHESIA POSTPROCEDURE EVALUATION
Department of Anesthesiology  Postprocedure Note    Patient: Ranulfo Hernandez  MRN: [id number]  YOB: 1955  Date of evaluation: 3/27/2024    Procedure Summary       Date: 03/27/24 Room / Location: [facility] MAIN OR M3 / [facility] MAIN OR    Anesthesia Start: 1439 Anesthesia Stop: 1514    Procedure: RIGHT FOOT GREAT TOE INCISION AND DRAINAGE WITH BONE RESECTION (MAC W/LOCAL) (Right: Foot) Diagnosis:       Osteomyelitis, acute, ankle or foot, right (HCC)      (Osteomyelitis, acute, ankle or foot, right (HCC) [M86.171])    Providers: Juan Pablo Antoine DPM Responsible Provider: Roque Hogan MD    Anesthesia Type: MAC ASA Status: 3            Anesthesia Type: MAC    Keyla Phase I: Keyla Score: 10    Keyla Phase II:      Anesthesia Post Evaluation    Patient location during evaluation: bedside  Patient participation: complete - patient participated  Level of consciousness: awake  Pain score: 0  Airway patency: patent  Nausea & Vomiting: no nausea and no vomiting  Cardiovascular status: hemodynamically stable  Respiratory status: acceptable  Hydration status: euvolemic  Pain management: adequate    No notable events documented.

## 2024-03-27 NOTE — PROGRESS NOTES
Pharmacy Antimicrobial Kinetic Dosing    Indication for Antimicrobials: SSTI (cellulitis), possible osteomyelitis     Current Regimen of Each Antimicrobial:  Vancomycin Pharmacy to Dose; Start Date 3/25; Day # 3  Zosyn 4.5g IV loading, then 3.375gm q8h; Start 3/25; Day # 3    Previous Antimicrobial Therapy:  N/A    Goal Level: Vancomycin -600    Date Dose & Interval Measured (mcg/mL) Predicted AUC   3/27 1250mg q12h 8.8 422                 Significant Cultures:   3/25 Blood, paired: ngtd, prelim    Labs:  Recent Labs     Units 24  0439 24  0353 24  1035   CREATININE MG/DL 0.82 0.83 0.82   BUN MG/DL 11 12 8   WBC K/uL 6.2 6.4 8.8     Temp (24hrs), Av.1 °F (36.7 °C), Min:97.9 °F (36.6 °C), Max:98.4 °F (36.9 °C)    Conditions for Dosing Consideration: None    Creatinine Clearance (mL/min): Estimated Creatinine Clearance: 103 mL/min (based on SCr of 0.82 mg/dL).     Impression/Plan:   Continue vancomycin 1250mg IV q12h for a projected   Level scheduled for 3/29 1100  BMP ordered daily   Antimicrobial stop date 7 days      Pharmacy will follow daily and adjust medications as appropriate for renal function and/or serum levels.    Thank you,  JORDAN HEMPHILL [facility]

## 2024-03-27 NOTE — ANESTHESIA PRE PROCEDURE
Department of Anesthesiology  Preprocedure Note       Name:  Ranulfo Hernandez   Age:  69 y.o.  :  1955                                          MRN:  [id number]         Date:  3/27/2024      Surgeon: Surgeon(s):  Juan Pablo Antoine DPM    Procedure: Procedure(s):  RIGHT FOOT GREAT TOE INCISION AND DRAINAGE WITH BONE RESECTION (MAC W/LOCAL)    Medications prior to admission:   Prior to Admission medications    Medication Sig Start Date End Date Taking? Authorizing Provider   aspirin 325 MG tablet Take 1 tablet by mouth daily   Yes Justin Pimentel MD   Omega 3 1000 MG CAPS Take 1,000 mg by mouth daily   Yes Justin Pimentel MD   vitamin D 25 MCG (1000 UT) CAPS Take 1 capsule by mouth daily   Yes Justin Pimentel MD   ascorbic acid (VITAMIN C) 500 MG tablet Take 2 tablets by mouth daily   Yes Justin Pimentel MD   b complex vitamins capsule Take 1 capsule by mouth daily   Yes Justin Pimentel MD   zinc gluconate 50 MG tablet Take 1 tablet by mouth daily   Yes Justin Pimentel MD   Coenzyme Q10 (COQ10) 200 MG CAPS Take 1 capsule by mouth daily   Yes Justin Pimentel MD   Turmeric 500 MG TABS Take 2 capsules by mouth daily   Yes ProviderJustin MD   Multiple Vitamins-Minerals (THERAPEUTIC MULTIVITAMIN-MINERALS) tablet Take 1 tablet by mouth daily   Yes Justin Pimentel MD   Garlic 350 MG TABS Take 1 capsule by mouth daily   Yes Justin Pimentel MD   hydroCHLOROthiazide (HYDRODIURIL) 12.5 MG tablet TAKE 1 TABLET IN THE MORNING ORALLY ONCE A DAY FOR BLOOD PRESSURE 23   Modesto Monroe MD   amLODIPine (NORVASC) 10 MG tablet TAKE 1 TABLET EVERY DAY FOR BLOOD PRESSURE 23   Modesto Monroe MD   rosuvastatin (CRESTOR) 20 MG tablet TAKE 1 TABLET EVERY DAY TO LOWER CHOLESTEROL 23   Modesto Monroe MD   albuterol sulfate HFA (PROVENTIL;VENTOLIN;PROAIR) 108 (90 Base) MCG/ACT inhaler Inhale into the lungs    Automatic Reconciliation, Ar

## 2024-03-27 NOTE — PROGRESS NOTES
Bedside shift change report given to JOSE A Ko (oncoming nurse) by JOSE A Westfall (offgoing nurse). Report included the following information Nurse Handoff Report, Index, MAR, and Recent Results.

## 2024-03-28 LAB
ANION GAP SERPL CALC-SCNC: 4 MMOL/L (ref 5–15)
BASOPHILS # BLD: 0 K/UL (ref 0–0.1)
BASOPHILS NFR BLD: 1 % (ref 0–1)
BUN SERPL-MCNC: 14 MG/DL (ref 6–20)
BUN/CREAT SERPL: 14 (ref 12–20)
CALCIUM SERPL-MCNC: 9.1 MG/DL (ref 8.5–10.1)
CHLORIDE SERPL-SCNC: 109 MMOL/L (ref 97–108)
CO2 SERPL-SCNC: 27 MMOL/L (ref 21–32)
CREAT SERPL-MCNC: 1 MG/DL (ref 0.7–1.3)
DIFFERENTIAL METHOD BLD: ABNORMAL
EOSINOPHIL # BLD: 0.2 K/UL (ref 0–0.4)
EOSINOPHIL NFR BLD: 3 % (ref 0–7)
ERYTHROCYTE [DISTWIDTH] IN BLOOD BY AUTOMATED COUNT: 13.8 % (ref 11.5–14.5)
GLUCOSE SERPL-MCNC: 95 MG/DL (ref 65–100)
HCT VFR BLD AUTO: 44.9 % (ref 36.6–50.3)
HGB BLD-MCNC: 14.2 G/DL (ref 12.1–17)
IMM GRANULOCYTES # BLD AUTO: 0 K/UL (ref 0–0.04)
IMM GRANULOCYTES NFR BLD AUTO: 0 % (ref 0–0.5)
LYMPHOCYTES # BLD: 1.4 K/UL (ref 0.8–3.5)
LYMPHOCYTES NFR BLD: 18 % (ref 12–49)
MCH RBC QN AUTO: 31.5 PG (ref 26–34)
MCHC RBC AUTO-ENTMCNC: 31.6 G/DL (ref 30–36.5)
MCV RBC AUTO: 99.6 FL (ref 80–99)
MONOCYTES # BLD: 0.9 K/UL (ref 0–1)
MONOCYTES NFR BLD: 11 % (ref 5–13)
NEUTS SEG # BLD: 5.5 K/UL (ref 1.8–8)
NEUTS SEG NFR BLD: 67 % (ref 32–75)
NRBC # BLD: 0 K/UL (ref 0–0.01)
NRBC BLD-RTO: 0 PER 100 WBC
PLATELET # BLD AUTO: 233 K/UL (ref 150–400)
PMV BLD AUTO: 9.5 FL (ref 8.9–12.9)
POTASSIUM SERPL-SCNC: 3.9 MMOL/L (ref 3.5–5.1)
RBC # BLD AUTO: 4.51 M/UL (ref 4.1–5.7)
SODIUM SERPL-SCNC: 140 MMOL/L (ref 136–145)
WBC # BLD AUTO: 8.1 K/UL (ref 4.1–11.1)

## 2024-03-28 PROCEDURE — 36415 COLL VENOUS BLD VENIPUNCTURE: CPT

## 2024-03-28 PROCEDURE — 94640 AIRWAY INHALATION TREATMENT: CPT

## 2024-03-28 PROCEDURE — 6360000002 HC RX W HCPCS: Performed by: STUDENT IN AN ORGANIZED HEALTH CARE EDUCATION/TRAINING PROGRAM

## 2024-03-28 PROCEDURE — 6360000002 HC RX W HCPCS: Performed by: INTERNAL MEDICINE

## 2024-03-28 PROCEDURE — 6370000000 HC RX 637 (ALT 250 FOR IP): Performed by: NURSE PRACTITIONER

## 2024-03-28 PROCEDURE — 94761 N-INVAS EAR/PLS OXIMETRY MLT: CPT

## 2024-03-28 PROCEDURE — 6370000000 HC RX 637 (ALT 250 FOR IP): Performed by: STUDENT IN AN ORGANIZED HEALTH CARE EDUCATION/TRAINING PROGRAM

## 2024-03-28 PROCEDURE — 80048 BASIC METABOLIC PNL TOTAL CA: CPT

## 2024-03-28 PROCEDURE — 1100000003 HC PRIVATE W/ TELEMETRY

## 2024-03-28 PROCEDURE — 6360000002 HC RX W HCPCS: Performed by: NURSE PRACTITIONER

## 2024-03-28 PROCEDURE — 2580000003 HC RX 258: Performed by: STUDENT IN AN ORGANIZED HEALTH CARE EDUCATION/TRAINING PROGRAM

## 2024-03-28 PROCEDURE — 2580000003 HC RX 258: Performed by: INTERNAL MEDICINE

## 2024-03-28 PROCEDURE — 85025 COMPLETE CBC W/AUTO DIFF WBC: CPT

## 2024-03-28 RX ORDER — OXYCODONE HYDROCHLORIDE AND ACETAMINOPHEN 5; 325 MG/1; MG/1
1 TABLET ORAL EVERY 4 HOURS PRN
Status: DISCONTINUED | OUTPATIENT
Start: 2024-03-28 | End: 2024-03-30 | Stop reason: HOSPADM

## 2024-03-28 RX ADMIN — OXYCODONE AND ACETAMINOPHEN 1 TABLET: 5; 325 TABLET ORAL at 18:40

## 2024-03-28 RX ADMIN — VANCOMYCIN HYDROCHLORIDE 1250 MG: 10 INJECTION, POWDER, LYOPHILIZED, FOR SOLUTION INTRAVENOUS at 13:10

## 2024-03-28 RX ADMIN — IPRATROPIUM BROMIDE 0.5 MG: 0.5 SOLUTION RESPIRATORY (INHALATION) at 07:53

## 2024-03-28 RX ADMIN — AMLODIPINE BESYLATE 10 MG: 5 TABLET ORAL at 09:55

## 2024-03-28 RX ADMIN — SODIUM CHLORIDE: 9 INJECTION, SOLUTION INTRAVENOUS at 12:57

## 2024-03-28 RX ADMIN — PIPERACILLIN AND TAZOBACTAM 3375 MG: 3; .375 INJECTION, POWDER, LYOPHILIZED, FOR SOLUTION INTRAVENOUS at 20:25

## 2024-03-28 RX ADMIN — HYDROCHLOROTHIAZIDE 12.5 MG: 25 TABLET ORAL at 09:57

## 2024-03-28 RX ADMIN — OXYCODONE AND ACETAMINOPHEN 1 TABLET: 5; 325 TABLET ORAL at 22:47

## 2024-03-28 RX ADMIN — ARFORMOTEROL TARTRATE 15 MCG: 15 SOLUTION RESPIRATORY (INHALATION) at 07:53

## 2024-03-28 RX ADMIN — VANCOMYCIN HYDROCHLORIDE 1250 MG: 10 INJECTION, POWDER, LYOPHILIZED, FOR SOLUTION INTRAVENOUS at 00:41

## 2024-03-28 RX ADMIN — PIPERACILLIN AND TAZOBACTAM 3375 MG: 3; .375 INJECTION, POWDER, LYOPHILIZED, FOR SOLUTION INTRAVENOUS at 12:57

## 2024-03-28 RX ADMIN — PIPERACILLIN AND TAZOBACTAM 3375 MG: 3; .375 INJECTION, POWDER, LYOPHILIZED, FOR SOLUTION INTRAVENOUS at 04:48

## 2024-03-28 RX ADMIN — SODIUM CHLORIDE, PRESERVATIVE FREE 10 ML: 5 INJECTION INTRAVENOUS at 20:30

## 2024-03-28 RX ADMIN — Medication 100 MG: at 11:15

## 2024-03-28 RX ADMIN — SODIUM CHLORIDE: 9 INJECTION, SOLUTION INTRAVENOUS at 20:25

## 2024-03-28 RX ADMIN — ACETAMINOPHEN 650 MG: 325 TABLET ORAL at 00:39

## 2024-03-28 RX ADMIN — IPRATROPIUM BROMIDE 0.5 MG: 0.5 SOLUTION RESPIRATORY (INHALATION) at 19:23

## 2024-03-28 RX ADMIN — OXYCODONE AND ACETAMINOPHEN 1 TABLET: 5; 325 TABLET ORAL at 09:55

## 2024-03-28 RX ADMIN — FOLIC ACID 1 MG: 1 TABLET ORAL at 09:56

## 2024-03-28 RX ADMIN — SODIUM CHLORIDE, PRESERVATIVE FREE 10 ML: 5 INJECTION INTRAVENOUS at 09:56

## 2024-03-28 RX ADMIN — SODIUM CHLORIDE: 9 INJECTION, SOLUTION INTRAVENOUS at 04:47

## 2024-03-28 RX ADMIN — MORPHINE SULFATE 2 MG: 2 INJECTION, SOLUTION INTRAMUSCULAR; INTRAVENOUS at 02:26

## 2024-03-28 RX ADMIN — ROSUVASTATIN CALCIUM 20 MG: 5 TABLET, COATED ORAL at 20:19

## 2024-03-28 RX ADMIN — ARFORMOTEROL TARTRATE 15 MCG: 15 SOLUTION RESPIRATORY (INHALATION) at 19:23

## 2024-03-28 ASSESSMENT — PAIN DESCRIPTION - DESCRIPTORS
DESCRIPTORS: ACHING
DESCRIPTORS: ACHING
DESCRIPTORS: ACHING;POUNDING;THROBBING

## 2024-03-28 ASSESSMENT — PAIN SCALES - GENERAL
PAINLEVEL_OUTOF10: 4
PAINLEVEL_OUTOF10: 6
PAINLEVEL_OUTOF10: 9
PAINLEVEL_OUTOF10: 10
PAINLEVEL_OUTOF10: 10
PAINLEVEL_OUTOF10: 8

## 2024-03-28 ASSESSMENT — PAIN DESCRIPTION - LOCATION
LOCATION: LEG
LOCATION: FOOT;TOE (COMMENT WHICH ONE)
LOCATION: LEG;TOE (COMMENT WHICH ONE);FOOT
LOCATION: FOOT
LOCATION: FOOT;TOE (COMMENT WHICH ONE)

## 2024-03-28 ASSESSMENT — PAIN DESCRIPTION - ORIENTATION
ORIENTATION: RIGHT

## 2024-03-28 ASSESSMENT — PAIN - FUNCTIONAL ASSESSMENT
PAIN_FUNCTIONAL_ASSESSMENT: PREVENTS OR INTERFERES SOME ACTIVE ACTIVITIES AND ADLS
PAIN_FUNCTIONAL_ASSESSMENT: PREVENTS OR INTERFERES SOME ACTIVE ACTIVITIES AND ADLS

## 2024-03-28 NOTE — PROGRESS NOTES
.End of Shift Note    Bedside shift change report given to Kenisha NARANJO  (oncoming nurse) by Rebekah Carter RN (offgoing nurse).  Report included the following information SBAR, ED Summary, Intake/Output, MAR, Recent Results, Med Rec Status, Quality Measures, and Dual Neuro Assessment    Shift worked:  7am-7-pm      Shift summary and any significant changes:     Pt was on pain , notified to provider regarding pain  , PRN PERCOCET is given at 9:55 am & 6:40 PM schedule med.given .      Concerns for physician to address:  -     Zone phone for oncoming shift:   [phone]       Activity:     Number times ambulated in hallways past shift: 1 time ambulate to the bathroom   Number of times OOB to chair past shift: 0    Cardiac:   Cardiac Monitoring: Yes           Access:  Current line(s): PIV     Genitourinary:   Urinary status: voiding    Respiratory:      Chronic home O2 use?: NO  Incentive spirometer at bedside: N/A       GI:     Current diet:  ADULT DIET; Regular; Low Fat/Low Chol/High Fiber/ALEXIS; Double Protein Portions  Passing flatus: YES LBM :today   Tolerating current diet: YES       Pain Management:   Patient states pain is manageable on current regimen: YES    Skin:     Interventions: nutritional support    Patient Safety:  Fall Score:    Interventions: bed/chair alarm, assistive device (walker, cane. etc), gripper socks, and pt to call before getting OOB       Length of Stay:  Expected LOS: 5  Actual LOS: 3      Rebekah Carter RN

## 2024-03-28 NOTE — PLAN OF CARE
Problem: Respiratory - Adult  Goal: Achieves optimal ventilation and oxygenation  3/28/2024 1529 by Rebekah Carter RN  Outcome: Progressing  3/28/2024 1419 by Rand Isaac, RT  Outcome: Progressing     Problem: Discharge Planning  Goal: Discharge to home or other facility with appropriate resources  Outcome: Progressing     Problem: Pain  Goal: Verbalizes/displays adequate comfort level or baseline comfort level  Outcome: Progressing     Problem: Safety - Adult  Goal: Free from fall injury  Outcome: Progressing     Problem: Nutrition Deficit:  Goal: Optimize nutritional status  Outcome: Progressing

## 2024-03-28 NOTE — PROGRESS NOTES
Hospitalist Progress Note    NAME:   Ranulfo Hernandez   : 1955   MRN: [id number]     Date/Time: 3/28/2024 9:23 AM  Patient PCP: Unknown, Provider, DO    Estimated discharge date: 2-3 days  Barriers: cultures, podiatry clearance      Assessment / Plan:    Possible osteomyelitis distal phalanx right great toe  Cellulitis of the right foot  History of calluses on the plantar aspects of bilateral feet  Chronic wound on the great toe  Possible history of neuropathy  R foot X-ray 3/25: right foot reviewed osteoarthritis of great toe distal phalanx  MRI of the right foot 3/25: revealed osteomyelitis distal phalanx of the great toe with abscess and sinus tract, developing small subcentimeter abscess in the plantar soft tissues of the distal phalanx of the great toe  Vascular duplex lower extremity venous 3/25:  no evidence of deep vein thrombosis  BC paired 3/25: NGTD  Anaerobic culture 3/27: in process  Wound culture 3/27: no organism seen  ECG: NSR, non specific ST wave abnormality  - Patient received a dose of Zosyn and vancomycin in the ED, continue the same  - Podiatry following, plan for I&D today at 1PM  - Pain management as needed with Tylenol  - Nonweightbearing, wound care as per nursing     RCRI 0, 3.9% risk of cardiac death within 30 days of surgery.  Patient is at minimal risk of having adverse events postsurgery.     History of head injury with laceration and forehead  CT head without contrast: no acute abnormality  - Wound care as per nursing     Active smoker  History of alcohol use-at risk for withdrawal  - Patient on nicotine patch-continue for now  - CIWA protocol per shift-diazepam ordered for any alcohol withdrawal symptoms  - Ordered multivitamins     Hypertension  - continue Norvasc, HCTZ     Hyperlipidemia  - continue Crestor     Scoliosis   - supportive treatment          Medical Decision Making:   I personally reviewed labs: BMP, CBC, wC, BC, anaerobic culture  I personally reviewed

## 2024-03-28 NOTE — PROGRESS NOTES
Pharmacy Antimicrobial Kinetic Dosing    Indication for Antimicrobials: SSTI (cellulitis), possible osteomyelitis     Current Regimen of Each Antimicrobial:  Vancomycin Pharmacy to Dose; Start Date 3/25; Day # 4  Zosyn 4.5g IV loading, then 3.375gm q8h; Start 3/25; Day # 4    Previous Antimicrobial Therapy:  N/A    Goal Level: Vancomycin -600    Date Dose & Interval Measured (mcg/mL) Predicted AUC   3/27 1250mg q12h 8.8 422                 Significant Cultures:   3/25 Blood, paired: ngtd, prelim  3/27 Wound, foot: pending  3/27 Anaerobic, foot: pending    Labs:  Recent Labs     Units 24  0237 24  0439 24  0353 24  1035   CREATININE MG/DL 1.00 0.82 0.83 0.82   BUN MG/DL 14 11 12 8   WBC K/uL 8.1 6.2 6.4 8.8     Temp (24hrs), Av °F (36.7 °C), Min:97.5 °F (36.4 °C), Max:98.5 °F (36.9 °C)    Conditions for Dosing Consideration: None    Creatinine Clearance (mL/min): Estimated Creatinine Clearance: 84 mL/min (based on SCr of 1 mg/dL).     Impression/Plan:   Continue vancomycin 1250mg IV q12h for a projected   Level scheduled for 3/29 1100  BMP ordered daily   Antimicrobial stop date 7 days (end date 3/31 - may need to prolong if OM)     Pharmacy will follow daily and adjust medications as appropriate for renal function and/or serum levels.    Thank you,  JORDAN HEMPHILL [facility]

## 2024-03-28 NOTE — OP NOTE
[facility]              [address] [place]  [address]                            OPERATIVE REPORT      PATIENT NAME: MARIA ANTONIA HOLLOWAY           : 1955  MED REC NO: [id number]                       ROOM: 1137  ACCOUNT NO: [id number]                       ADMIT DATE: 2024  PROVIDER: Juan Pablo Antoine MD    DATE OF SERVICE:  2024    PREOPERATIVE DIAGNOSES:  Osteomyelitis of great toe, right foot.    POSTOPERATIVE DIAGNOSES:  Osteomyelitis of great toe, right foot.    PROCEDURES PERFORMED:  Resection of distal phalanx with bone biopsy and irrigation of ulcer, great toe, right foot.    SURGEON:  Juan Pablo Antoine MD    ASSISTANT:  None.    ANESTHESIA:  Sedation with local.    ESTIMATED BLOOD LOSS:  Minimal.    SPECIMENS REMOVED:  Necrotic-appearing bone of distal phalanx, great toe, right foot, viable bone, proximal margin, great toe, right foot.    INTRAOPERATIVE FINDINGS:  Necrotic distal bone great toe right foot     COMPLICATIONS:  None apparent    IMPLANTS:  None.    INDICATIONS:  Ulcer and x-ray findings of bony destruction, great toe, right foot.    DESCRIPTION OF PROCEDURE:  The patient was brought to the operating room and placed on the operating table in the supine position.  Anesthesiologist administered sedation anesthesia followed by local infiltrative block as mentioned above.  The right foot was then prepped and draped in the usual aseptic technique.  Once completed, an Esmarch bandage was utilized at the base of the proximal phalanx for hemostasis.  Once completed, a curvilinear distal incision was placed full-thickness flap to expose the distal phalanx.  No purulence was seen and good plantar flap was appreciated.  This was preserved.  The bone was resected and necrotic destructed bone was sent to Pathology in fixed solution.  Furthermore, resection was carried out to viable bone level.  Once removed, the area was irrigated and the  nonviable tissue was debrided sharply to bleeding tissue edges.  Once irrigation had been completed, a more proximal viable bone margin was taken and sent as specimen #2 in  fixed solution.  Once completed, the area was then reapproximated and coapted with 3-0 nylon.  The Esmarch was now removed from the base of the great toe, right foot, showing immediate hyperemic flush to the digit.  The patient tolerated the above procedures and anesthesia well, was discharged from the operating room with vital signs stable and vascular status intact to the right foot.  Prognosis for this patient is satisfactory.    INJECTABLES:  10 mL of 0.5% Marcaine plain to great toe, right foot.    CULTURES:  Aerobic and anaerobic deep tissue cultures of ulcer site, great toe, right foot.        ARMANDO GARCIA MD      LWN/AQS  D:  03/27/2024 15:20:30  T:  03/27/2024 20:47:59  JOB #:  [id number]

## 2024-03-28 NOTE — PROGRESS NOTES
End of Shift Note    Bedside shift change report given to Irvin NARANJO (oncoming nurse) by Martinez Turcios RN (offgoing nurse).  Report included the following information SBAR, Kardex, Intake/Output, MAR, Recent Results, and Cardiac Rhythm sinus rhythm    Shift worked:  7p-7a     Shift summary and any significant changes:     Patient was in a lot of pain at the beginning of the shift. Messaged the provider and received morphine. PRN morphine given x2 and valium x1 for anxiety. Scheduled meds given.        Length of Stay:  Expected LOS: 4  Actual LOS: 3      Martinez Turcios RN

## 2024-03-28 NOTE — PROGRESS NOTES
End of Shift Note    Bedside shift change report given to JOSE A Henriquez (oncoming nurse) by Stefani Youngblood RN (offgoing nurse).  Report included the following information SBAR, Kardex, Procedure Summary, Intake/Output, and MAR    Shift worked:  [id number]     Shift summary and any significant changes:     Patient off the unit in the afternoon for an IND, bone resection and biopsy of the great right toe.  Pt was NPO since midnight, morning meds were given with sips of water.  On return to the unit after procedure, vitals signs were stable, no complaints of pain.  At end of shift 1 dose of PRN tylenol given for pain.  Pt had a visitor after surgery.   Concerns for physician to address:       Zone phone for oncoming shift:            Stefani Youngblood RN

## 2024-03-28 NOTE — PROGRESS NOTES
Nursing contacted Nocturnist/cross cover provider and notified patient s/p toe amputation today, and asking for pain meds, only has prn tylenol ordered. No other concerns reported. VSS. Ordered morphine 2mg prn to  in the AM, nurse to notify dayshift for further pain mgmt orders. Patient denies any further complaints or concerns. No acute distress reported. Nursing to notify Hospitalist for further/continued concerns. Will remain available overnight for further concerns if nursing/patient needs. Will defer further evaluation/management to the day shift primary care team.    Non-billable note.

## 2024-03-28 NOTE — PROGRESS NOTES
Patient seen today, resting well, the pain medication appears to be helping.    Bandage change today right foot, looks improved. Sutures intact with no signs of infection. New bandage with sterile bandage right foot.    May ambulate to tolerance right foot with surgical shoe. Awaiting final pathology and cultures.

## 2024-03-29 LAB
ANION GAP SERPL CALC-SCNC: 4 MMOL/L (ref 5–15)
BACTERIA SPEC CULT: ABNORMAL
BACTERIA SPEC CULT: NORMAL
BUN SERPL-MCNC: 16 MG/DL (ref 6–20)
BUN/CREAT SERPL: 17 (ref 12–20)
CALCIUM SERPL-MCNC: 9.2 MG/DL (ref 8.5–10.1)
CHLORIDE SERPL-SCNC: 107 MMOL/L (ref 97–108)
CO2 SERPL-SCNC: 28 MMOL/L (ref 21–32)
CREAT SERPL-MCNC: 0.96 MG/DL (ref 0.7–1.3)
GLUCOSE SERPL-MCNC: 127 MG/DL (ref 65–100)
GRAM STN SPEC: ABNORMAL
GRAM STN SPEC: ABNORMAL
POTASSIUM SERPL-SCNC: 3.8 MMOL/L (ref 3.5–5.1)
SERVICE CMNT-IMP: ABNORMAL
SERVICE CMNT-IMP: NORMAL
SODIUM SERPL-SCNC: 139 MMOL/L (ref 136–145)
VANCOMYCIN SERPL-MCNC: 10.9 UG/ML

## 2024-03-29 PROCEDURE — 6360000002 HC RX W HCPCS: Performed by: STUDENT IN AN ORGANIZED HEALTH CARE EDUCATION/TRAINING PROGRAM

## 2024-03-29 PROCEDURE — 6370000000 HC RX 637 (ALT 250 FOR IP): Performed by: STUDENT IN AN ORGANIZED HEALTH CARE EDUCATION/TRAINING PROGRAM

## 2024-03-29 PROCEDURE — 80048 BASIC METABOLIC PNL TOTAL CA: CPT

## 2024-03-29 PROCEDURE — 94761 N-INVAS EAR/PLS OXIMETRY MLT: CPT

## 2024-03-29 PROCEDURE — 36415 COLL VENOUS BLD VENIPUNCTURE: CPT

## 2024-03-29 PROCEDURE — 6370000000 HC RX 637 (ALT 250 FOR IP): Performed by: NURSE PRACTITIONER

## 2024-03-29 PROCEDURE — 80202 ASSAY OF VANCOMYCIN: CPT

## 2024-03-29 PROCEDURE — 1100000003 HC PRIVATE W/ TELEMETRY

## 2024-03-29 PROCEDURE — 2580000003 HC RX 258: Performed by: INTERNAL MEDICINE

## 2024-03-29 PROCEDURE — 6360000002 HC RX W HCPCS: Performed by: INTERNAL MEDICINE

## 2024-03-29 PROCEDURE — 2580000003 HC RX 258: Performed by: STUDENT IN AN ORGANIZED HEALTH CARE EDUCATION/TRAINING PROGRAM

## 2024-03-29 PROCEDURE — 94640 AIRWAY INHALATION TREATMENT: CPT

## 2024-03-29 RX ADMIN — ARFORMOTEROL TARTRATE 15 MCG: 15 SOLUTION RESPIRATORY (INHALATION) at 19:49

## 2024-03-29 RX ADMIN — OXYCODONE AND ACETAMINOPHEN 1 TABLET: 5; 325 TABLET ORAL at 03:44

## 2024-03-29 RX ADMIN — IPRATROPIUM BROMIDE 0.5 MG: 0.5 SOLUTION RESPIRATORY (INHALATION) at 07:26

## 2024-03-29 RX ADMIN — VANCOMYCIN HYDROCHLORIDE 1250 MG: 10 INJECTION, POWDER, LYOPHILIZED, FOR SOLUTION INTRAVENOUS at 13:45

## 2024-03-29 RX ADMIN — SODIUM CHLORIDE, PRESERVATIVE FREE 10 ML: 5 INJECTION INTRAVENOUS at 08:09

## 2024-03-29 RX ADMIN — Medication 100 MG: at 08:09

## 2024-03-29 RX ADMIN — HYDROCHLOROTHIAZIDE 12.5 MG: 25 TABLET ORAL at 08:09

## 2024-03-29 RX ADMIN — ROSUVASTATIN CALCIUM 20 MG: 5 TABLET, COATED ORAL at 21:03

## 2024-03-29 RX ADMIN — SODIUM CHLORIDE, PRESERVATIVE FREE 10 ML: 5 INJECTION INTRAVENOUS at 21:05

## 2024-03-29 RX ADMIN — OXYCODONE AND ACETAMINOPHEN 1 TABLET: 5; 325 TABLET ORAL at 20:45

## 2024-03-29 RX ADMIN — PIPERACILLIN AND TAZOBACTAM 3375 MG: 3; .375 INJECTION, POWDER, LYOPHILIZED, FOR SOLUTION INTRAVENOUS at 20:24

## 2024-03-29 RX ADMIN — PIPERACILLIN AND TAZOBACTAM 3375 MG: 3; .375 INJECTION, POWDER, LYOPHILIZED, FOR SOLUTION INTRAVENOUS at 12:09

## 2024-03-29 RX ADMIN — ENOXAPARIN SODIUM 40 MG: 100 INJECTION SUBCUTANEOUS at 09:08

## 2024-03-29 RX ADMIN — SODIUM CHLORIDE: 9 INJECTION, SOLUTION INTRAVENOUS at 03:47

## 2024-03-29 RX ADMIN — OXYCODONE AND ACETAMINOPHEN 1 TABLET: 5; 325 TABLET ORAL at 09:07

## 2024-03-29 RX ADMIN — PIPERACILLIN AND TAZOBACTAM 3375 MG: 3; .375 INJECTION, POWDER, LYOPHILIZED, FOR SOLUTION INTRAVENOUS at 03:48

## 2024-03-29 RX ADMIN — AMLODIPINE BESYLATE 10 MG: 5 TABLET ORAL at 08:09

## 2024-03-29 RX ADMIN — IPRATROPIUM BROMIDE 0.5 MG: 0.5 SOLUTION RESPIRATORY (INHALATION) at 19:49

## 2024-03-29 RX ADMIN — ARFORMOTEROL TARTRATE 15 MCG: 15 SOLUTION RESPIRATORY (INHALATION) at 07:26

## 2024-03-29 RX ADMIN — VANCOMYCIN HYDROCHLORIDE 1250 MG: 10 INJECTION, POWDER, LYOPHILIZED, FOR SOLUTION INTRAVENOUS at 01:31

## 2024-03-29 RX ADMIN — FOLIC ACID 1 MG: 1 TABLET ORAL at 08:09

## 2024-03-29 ASSESSMENT — PAIN DESCRIPTION - ORIENTATION
ORIENTATION: PROXIMAL
ORIENTATION: RIGHT
ORIENTATION: RIGHT

## 2024-03-29 ASSESSMENT — PAIN SCALES - GENERAL
PAINLEVEL_OUTOF10: 10
PAINLEVEL_OUTOF10: 8

## 2024-03-29 ASSESSMENT — PAIN - FUNCTIONAL ASSESSMENT: PAIN_FUNCTIONAL_ASSESSMENT: PREVENTS OR INTERFERES SOME ACTIVE ACTIVITIES AND ADLS

## 2024-03-29 ASSESSMENT — PAIN SCALES - WONG BAKER: WONGBAKER_NUMERICALRESPONSE: NO HURT

## 2024-03-29 ASSESSMENT — PAIN DESCRIPTION - LOCATION
LOCATION: FOOT
LOCATION: FOOT

## 2024-03-29 ASSESSMENT — PAIN DESCRIPTION - DESCRIPTORS
DESCRIPTORS: ACHING;THROBBING
DESCRIPTORS: THROBBING
DESCRIPTORS: STABBING

## 2024-03-29 NOTE — PROGRESS NOTES
Hospitalist Progress Note    NAME:   Ranulfo Hernandez   : 1955   MRN: [id number]     Date/Time: 3/29/2024 3:03 PM  Patient PCP: Unknown, Provider, DO    Estimated discharge date: >24 hours  Barriers: final cultures      Assessment / Plan:    Possible osteomyelitis distal phalanx right great toe  Cellulitis of the right foot  History of calluses on the plantar aspects of bilateral feet  Chronic wound on the great toe  Possible history of neuropathy  R foot X-ray 3/25: right foot reviewed osteoarthritis of great toe distal phalanx  MRI of the right foot 3/25: revealed osteomyelitis distal phalanx of the great toe with abscess and sinus tract, developing small subcentimeter abscess in the plantar soft tissues of the distal phalanx of the great toe  Vascular duplex lower extremity venous 3/25:  no evidence of deep vein thrombosis  BC paired 3/25: NGTD  Anaerobic culture 3/27: in process  Wound culture 3/27 prelim: few streptococci B hemolytic group B  ECG: NSR, non specific ST wave abnormality  - Patient received a dose of Zosyn and vancomycin in the ED, continue the same  - Podiatry following, plan for I&D today at 1PM  - Pain management as needed with Tylenol  - Nonweightbearing, wound care as per nursing     History of head injury with laceration and forehead  CT head without contrast: no acute abnormality  - Wound care as per nursing     Active smoker  History of alcohol use-at risk for withdrawal  - Patient on nicotine patch-continue for now  - CIWA protocol per shift-diazepam ordered for any alcohol withdrawal symptoms  - Ordered multivitamins     Hypertension  - continue Norvasc, HCTZ     Hyperlipidemia  - continue Crestor     Scoliosis   - supportive treatment          Medical Decision Making:   I personally reviewed labs: BMP, CBC, cultures  I personally reviewed imaging: none  I personally reviewed EKG: NSR  Toxic drug monitoring: Vanc  Discussed case with: attending, ZORA        Code Status:  drinks a day.     In the ED patient received Zosyn and vancomycin.  Podiatry was consulted.  Patient was taken for MRI.    Objective:     VITALS:   Last 24hrs VS reviewed since prior progress note. Most recent are:  Patient Vitals for the past 24 hrs:   BP Temp Temp src Pulse Resp SpO2   03/29/24 0743 118/67 97.7 °F (36.5 °C) Oral 65 16 94 %   03/29/24 0344 135/67 97.7 °F (36.5 °C) Oral 58 16 --   03/28/24 2247 -- -- -- -- 16 --   03/28/24 2014 137/77 97.9 °F (36.6 °C) Oral 78 16 90 %   03/28/24 1923 -- -- -- 72 16 91 %   03/28/24 1840 -- -- -- -- 18 --   03/28/24 1556 130/63 97.7 °F (36.5 °C) Oral 71 16 91 %   03/28/24 1025 -- -- -- -- 17 --   03/28/24 1000 127/83 -- -- 83 -- --   03/28/24 0955 -- -- -- -- 20 --         Intake/Output Summary (Last 24 hours) at 3/29/2024 0837  Last data filed at 3/29/2024 0344  Gross per 24 hour   Intake 400 ml   Output 1325 ml   Net -925 ml        I had a face to face encounter and independently examined this patient on 3/29/2024, as outlined below:  PHYSICAL EXAM:  General: Alert, cooperative  EENT:  EOMI. Anicteric sclerae.  Resp:  CTA bilaterally, no wheezing or rales.  No accessory muscle use  CV:  Regular  rhythm,  No edema  GI:  Soft, Non distended, Non tender.  +Bowel sounds  Neurologic:  Alert and oriented X 3, normal speech,   Psych:   Good insight. Not anxious nor agitated  Skin:  No rashes.  No jaundice    Reviewed most current lab test results and cultures  YES  Reviewed most current radiology test results   YES  Review and summation of old records today    NO  Reviewed patient's current orders and MAR    YES  PMH/SH reviewed - no change compared to H&P    Procedures: see electronic medical records for all procedures/Xrays and details which were not copied into this note but were reviewed prior to creation of Plan.      LABS:  I reviewed today's most current labs and imaging studies.  Pertinent labs include:  Recent Labs     03/27/24  0439 03/28/24  0237   WBC 6.2 8.1

## 2024-03-29 NOTE — PROGRESS NOTES
Attempted to schedule hospital follow up PCP appointment. Office  will contact the patient with appointment information once an appt has been secured. PCP office will need to fit the patient in the schedule since they are booked out until July.  Pending patient discharge. Alisa Gonzalez, Care Management Assistant

## 2024-03-29 NOTE — CARE COORDINATION
Transition of Care Plan:    RUR: 8%  Prior Level of Functioning: Independent   Disposition: Home   If SNF or IPR: Date FOC offered:   Date FOC received:   Accepting facility:   Date authorization started with reference number:   Date authorization received and expires:   Follow up appointments: PCP   DME needed: No needs   Transportation at discharge: Pt's family   IM/IMM Medicare/ letter given: 3/29/24  Is patient a  and connected with VA? No    If yes, was  transfer form completed and VA notified? No  Caregiver Contact:   Discharge Caregiver contacted prior to discharge? Pt will be contacted   Care Conference needed? No  Barriers to discharge: Medical clearance         CM reviewed pt's chart and pt is not medically stable for d/c waiting for cultures and podiatry clearance.    CM will follow and assist with d/c planning.    Shanice Garcias

## 2024-03-29 NOTE — PROGRESS NOTES
Patient continues to improve, distal right toe stable and no signs of infection.    Sterile bandage change today, continue with surgical shoe right foot.    Awaiting final bone margins and final cultures. Discussed with patient.

## 2024-03-29 NOTE — WOUND CARE
Wound Care Home Health Discharge Instructions:      -Q 48-hour bandage changes-Iodine distal aspect great toe right foot    -Conform bandage over this great toe right foot    -Continue with surgical shoe right foot

## 2024-03-29 NOTE — PROGRESS NOTES
End of Shift Note    Bedside shift change report given to Mckenzie NARANJO (oncoming nurse) by Martinez Turcios RN (offgoing nurse).  Report included the following information SBAR, Kardex, Intake/Output, MAR, Recent Results, and Cardiac Rhythm sinus rhythm    Shift worked:  7p-7a     Shift summary and any significant changes:     Patient tolerated care. Scheduled meds given and PRN Percocet given x2 for toe pain. Patient was able to rest over shift. Caring rounds provided. Blood work drawn.       Length of Stay:  Expected LOS: 5  Actual LOS: 4      Martinez Turcios RN

## 2024-03-29 NOTE — ADT AUTH CERT
MD Progress Notes    Progress Notes by Juan Pablo Antonie DPM at 3/25/2024 12:49 PM    Author: Juan Pablo Antoine DPM Service: Podiatry Author Type: Physician   Filed: 3/25/2024 12:49 PM Date of Service: 3/25/2024 12:49 PM Status: Signed   : Juan Pablo Antoine DPM (Physician)   X-rays reviewed of the patient, right great toe, distal tip destruction.     Awaiting MRI findings, helping for incision and drainage with bone biopsy great toe right foot.      Electronically signed by Juan Pablo Antoine DPM at 3/25/2024 12:49 PM     Progress Notes by Juan Pablo Antoine DPM at 3/25/2024  4:25 PM    Author: Juan Pablo Antoine DPM Service: Podiatry Author Type: Physician   Filed: 3/25/2024  4:26 PM Date of Service: 3/25/2024  4:25 PM Status: Signed   : Juan Pablo Antoine DPM (Physician)   Patient may eat today, we'll get him n.p.o.after midnight to do the case tomorrow -for his distal great toe showing distal phalanx osteomyelitis.        Electronically signed by Juan Pablo Antoine DPM at 3/25/2024  4:26 PM     Progress Notes by Juan Pablo Antoine DPM at 3/26/2024  9:43 AM    Author: Juan Pablo Antoine DPM Service: Podiatry Author Type: Physician   Filed: 3/26/2024  9:44 AM Date of Service: 3/26/2024  9:43 AM Status: Signed   : Juan Pablo Antoine DPM (Physician)   Patient is on the schedule for tomorrow OR 1 PM, right great toe.        Electronically signed by Juan Pablo Antoine DPM at 3/26/2024  9:44 AM     Progress Notes by Juan Pablo Antoine DPM at 3/28/2024 11:14 AM    Author: Juan Pablo Antoine DPM Service: Podiatry Author Type: Physician   Filed: 3/28/2024 11:15 AM Date of Service: 3/28/2024 11:14 AM Status: Signed   : Juan Pablo Antoine DPM (Physician)   Patient seen today, resting well, the pain medication appears to be helping.     Bandage change today right foot, looks improved. Sutures intact with no signs of infection. New bandage with sterile bandage right foot.     May ambulate to tolerance right foot

## 2024-03-30 VITALS
HEART RATE: 68 BPM | DIASTOLIC BLOOD PRESSURE: 72 MMHG | RESPIRATION RATE: 18 BRPM | BODY MASS INDEX: 26.09 KG/M2 | SYSTOLIC BLOOD PRESSURE: 125 MMHG | HEIGHT: 76 IN | TEMPERATURE: 97.5 F | OXYGEN SATURATION: 97 % | WEIGHT: 214.29 LBS

## 2024-03-30 LAB
ANION GAP SERPL CALC-SCNC: 6 MMOL/L (ref 5–15)
BUN SERPL-MCNC: 16 MG/DL (ref 6–20)
BUN/CREAT SERPL: 21 (ref 12–20)
CALCIUM SERPL-MCNC: 8.8 MG/DL (ref 8.5–10.1)
CHLORIDE SERPL-SCNC: 108 MMOL/L (ref 97–108)
CO2 SERPL-SCNC: 25 MMOL/L (ref 21–32)
CREAT SERPL-MCNC: 0.77 MG/DL (ref 0.7–1.3)
GLUCOSE SERPL-MCNC: 113 MG/DL (ref 65–100)
POTASSIUM SERPL-SCNC: 4 MMOL/L (ref 3.5–5.1)
SODIUM SERPL-SCNC: 139 MMOL/L (ref 136–145)

## 2024-03-30 PROCEDURE — 2580000003 HC RX 258: Performed by: INTERNAL MEDICINE

## 2024-03-30 PROCEDURE — 6360000002 HC RX W HCPCS: Performed by: INTERNAL MEDICINE

## 2024-03-30 PROCEDURE — 6370000000 HC RX 637 (ALT 250 FOR IP): Performed by: NURSE PRACTITIONER

## 2024-03-30 PROCEDURE — 6360000002 HC RX W HCPCS: Performed by: STUDENT IN AN ORGANIZED HEALTH CARE EDUCATION/TRAINING PROGRAM

## 2024-03-30 PROCEDURE — 94640 AIRWAY INHALATION TREATMENT: CPT

## 2024-03-30 PROCEDURE — 80048 BASIC METABOLIC PNL TOTAL CA: CPT

## 2024-03-30 PROCEDURE — 6370000000 HC RX 637 (ALT 250 FOR IP): Performed by: STUDENT IN AN ORGANIZED HEALTH CARE EDUCATION/TRAINING PROGRAM

## 2024-03-30 PROCEDURE — 2580000003 HC RX 258: Performed by: STUDENT IN AN ORGANIZED HEALTH CARE EDUCATION/TRAINING PROGRAM

## 2024-03-30 PROCEDURE — 36415 COLL VENOUS BLD VENIPUNCTURE: CPT

## 2024-03-30 RX ORDER — OXYCODONE HYDROCHLORIDE AND ACETAMINOPHEN 5; 325 MG/1; MG/1
1 TABLET ORAL EVERY 8 HOURS PRN
Qty: 10 TABLET | Refills: 0 | Status: SHIPPED | OUTPATIENT
Start: 2024-03-30 | End: 2024-04-02

## 2024-03-30 RX ORDER — AMOXICILLIN AND CLAVULANATE POTASSIUM 875; 125 MG/1; MG/1
1 TABLET, FILM COATED ORAL EVERY 12 HOURS SCHEDULED
Status: DISCONTINUED | OUTPATIENT
Start: 2024-03-30 | End: 2024-03-30 | Stop reason: HOSPADM

## 2024-03-30 RX ORDER — AMOXICILLIN AND CLAVULANATE POTASSIUM 875; 125 MG/1; MG/1
1 TABLET, FILM COATED ORAL EVERY 12 HOURS SCHEDULED
Qty: 20 TABLET | Refills: 0 | Status: SHIPPED | OUTPATIENT
Start: 2024-03-30 | End: 2024-04-09

## 2024-03-30 RX ADMIN — OXYCODONE AND ACETAMINOPHEN 1 TABLET: 5; 325 TABLET ORAL at 05:40

## 2024-03-30 RX ADMIN — OXYCODONE AND ACETAMINOPHEN 1 TABLET: 5; 325 TABLET ORAL at 09:46

## 2024-03-30 RX ADMIN — AMLODIPINE BESYLATE 10 MG: 5 TABLET ORAL at 09:44

## 2024-03-30 RX ADMIN — HYDROCHLOROTHIAZIDE 12.5 MG: 25 TABLET ORAL at 09:45

## 2024-03-30 RX ADMIN — SODIUM CHLORIDE: 9 INJECTION, SOLUTION INTRAVENOUS at 12:49

## 2024-03-30 RX ADMIN — IPRATROPIUM BROMIDE 0.5 MG: 0.5 SOLUTION RESPIRATORY (INHALATION) at 07:49

## 2024-03-30 RX ADMIN — VANCOMYCIN HYDROCHLORIDE 1250 MG: 10 INJECTION, POWDER, LYOPHILIZED, FOR SOLUTION INTRAVENOUS at 01:08

## 2024-03-30 RX ADMIN — PIPERACILLIN AND TAZOBACTAM 3375 MG: 3; .375 INJECTION, POWDER, LYOPHILIZED, FOR SOLUTION INTRAVENOUS at 12:37

## 2024-03-30 RX ADMIN — FOLIC ACID 1 MG: 1 TABLET ORAL at 09:45

## 2024-03-30 RX ADMIN — SODIUM CHLORIDE, PRESERVATIVE FREE 10 ML: 5 INJECTION INTRAVENOUS at 09:46

## 2024-03-30 RX ADMIN — PIPERACILLIN AND TAZOBACTAM 3375 MG: 3; .375 INJECTION, POWDER, LYOPHILIZED, FOR SOLUTION INTRAVENOUS at 04:20

## 2024-03-30 RX ADMIN — SODIUM CHLORIDE: 9 INJECTION, SOLUTION INTRAVENOUS at 12:36

## 2024-03-30 RX ADMIN — ENOXAPARIN SODIUM 40 MG: 100 INJECTION SUBCUTANEOUS at 09:45

## 2024-03-30 RX ADMIN — OXYCODONE AND ACETAMINOPHEN 1 TABLET: 5; 325 TABLET ORAL at 15:14

## 2024-03-30 RX ADMIN — ARFORMOTEROL TARTRATE 15 MCG: 15 SOLUTION RESPIRATORY (INHALATION) at 07:54

## 2024-03-30 RX ADMIN — VANCOMYCIN HYDROCHLORIDE 1250 MG: 10 INJECTION, POWDER, LYOPHILIZED, FOR SOLUTION INTRAVENOUS at 12:50

## 2024-03-30 RX ADMIN — Medication 100 MG: at 09:45

## 2024-03-30 ASSESSMENT — PAIN DESCRIPTION - DESCRIPTORS
DESCRIPTORS: ACHING
DESCRIPTORS: ACHING
DESCRIPTORS: ACHING;THROBBING
DESCRIPTORS: ACHING

## 2024-03-30 ASSESSMENT — PAIN SCALES - GENERAL
PAINLEVEL_OUTOF10: 7
PAINLEVEL_OUTOF10: 8

## 2024-03-30 ASSESSMENT — PAIN DESCRIPTION - LOCATION
LOCATION: TOE (COMMENT WHICH ONE)
LOCATION: FOOT;TOE (COMMENT WHICH ONE)
LOCATION: TOE (COMMENT WHICH ONE);FOOT
LOCATION: FOOT;TOE (COMMENT WHICH ONE)

## 2024-03-30 ASSESSMENT — PAIN DESCRIPTION - ORIENTATION
ORIENTATION: LEFT
ORIENTATION: RIGHT

## 2024-03-30 NOTE — CARE COORDINATION
Transition of Care Plan:     RUR: 8%    Prior Level of Functioning: Independent     Disposition: Home and [facility]    3:36 PM   [facility] accepted.   Information placed on AVS    2:55 Pm  CM sent out multiple HH referrals.   No one has accepted yet.      [facility] was interested but was not able to verify benefits until Monday.      [facility] was pending as well.  CM called [phone] and had to leave a  to see if they can accept or not.     CM talked to Hospitalist NP and explained that out of 14 referrals that were sent out.  No one has accepted yet. Hospitalist NP plans on having RN do teaching with Pt and have Pt follow up with Podiatry next week instead since unable to find HH agency.    11:24 AM  CM completed chart review.   MD rec  SN to help monitor wound at home.   Pt will be taught QOD wound care prior to DC by RN.   CM offered FOC for HH services.   Pt did not have a preference as long as insurance is in network with [employer]/ Medicare.     CM sent mass referral via [employer] to see who can accept.   HH: Pending    If SNF or IPR: Date FOC offered:   Date FOC received:   Accepting facility:   Date authorization started with reference number:   Date authorization received and expires:   Follow up appointments: PCP   DME needed: No needs   Transportation at discharge: Pt's family   IM/IMM Medicare/ letter given: 3/29/24  Is patient a [place] and connected with VA? No               If yes, was [place] transfer form completed and VA notified? No  Caregiver Contact:   Discharge Caregiver contacted prior to discharge? Pt will be contacted   Care Conference needed? No  Barriers to discharge: Medical clearance    set up.     Phylicia Joseph, Steven BLAKELY  [id number]

## 2024-03-30 NOTE — PROGRESS NOTES

## 2024-03-30 NOTE — DISCHARGE SUMMARY
the directions you see here.            CONTINUE taking these medications      albuterol sulfate  (90 Base) MCG/ACT inhaler  Commonly known as: PROVENTIL;VENTOLIN;PROAIR     amLODIPine 10 MG tablet  Commonly known as: NORVASC  TAKE 1 TABLET EVERY DAY FOR BLOOD PRESSURE     ascorbic acid 500 MG tablet  Commonly known as: VITAMIN C     aspirin 325 MG tablet     b complex vitamins capsule     CoQ10 200 MG Caps     Garlic 350 MG Tabs     hydroCHLOROthiazide 12.5 MG tablet  TAKE 1 TABLET IN THE MORNING ORALLY ONCE A DAY FOR BLOOD PRESSURE     Omega 3 1000 MG Caps     therapeutic multivitamin-minerals tablet     Turmeric 500 MG Tabs     umeclidinium-vilanterol 62.5-25 MCG/ACT inhaler  Commonly known as: ANORO ELLIPTA     vitamin D 25 MCG (1000 UT) Caps     zinc gluconate 50 MG tablet            STOP taking these medications      doxycycline monohydrate 100 MG capsule  Commonly known as: MONODOX     predniSONE 10 MG (21) Tbpk            ASK your doctor about these medications      amiodarone 200 MG tablet  Commonly known as: CORDARONE     apixaban 5 MG Tabs tablet  Commonly known as: ELIQUIS     methIMAzole 10 MG tablet  Commonly known as: TAPAZOLE               Where to Get Your Medications        These medications were sent to [facility] [place], [place] - [address] -  [phone] - F [phone] [address] [address]      Phone: [phone]   amoxicillin-clavulanate 875-125 MG per tablet  oxyCODONE-acetaminophen 5-325 MG per tablet             DISPOSITION:    Home with Family:       Home with HH/PT/OT/RN:    SNF/LTC:    ROGER:    OTHER:  HH with HH for wound care           Code status: Full  Recommended diet: regular diet  Recommended activity: activity as tolerated  Wound care:   Wound Care Home Health Discharge Instructions:  -Q 48-hour bandage changes-Iodine distal aspect great toe right foot     -Conform bandage over this great toe right foot

## 2024-03-31 ENCOUNTER — HOME HEALTH ADMISSION (OUTPATIENT)
Dept: HOME HEALTH SERVICES | Facility: HOME HEALTH | Age: 69
End: 2024-03-31
Payer: MEDICARE

## 2024-03-31 LAB
BACTERIA SPEC CULT: NORMAL
BACTERIA SPEC CULT: NORMAL
SERVICE CMNT-IMP: NORMAL
SERVICE CMNT-IMP: NORMAL

## 2024-04-03 ENCOUNTER — HOME CARE VISIT (OUTPATIENT)
Facility: HOME HEALTH | Age: 69
End: 2024-04-03
Payer: MEDICARE

## 2024-04-03 VITALS
OXYGEN SATURATION: 99 % | SYSTOLIC BLOOD PRESSURE: 142 MMHG | DIASTOLIC BLOOD PRESSURE: 78 MMHG | RESPIRATION RATE: 18 BRPM | HEART RATE: 72 BPM | TEMPERATURE: 98.3 F

## 2024-04-03 PROCEDURE — G0299 HHS/HOSPICE OF RN EA 15 MIN: HCPCS

## 2024-04-03 ASSESSMENT — ENCOUNTER SYMPTOMS: PAIN LOCATION - PAIN QUALITY: ACHING

## 2024-04-03 NOTE — HOME HEALTH
to assist with: NA Caregiver is available NA Caregiver is not present at this visit and did not participate with clinician.    Medications reconciled and all medications are available in the home this visit. The following education was provided regarding medications: medication interactions and look alike medications: NA Patient/CG ABLE to demonstrate knowledge through teach back with 50 percent accuracy.      MD  notified of any discrepancies/medication interactions NA       A list of reconciled medications has been given to the patient/caregiver  and uploaded to media.    High risk med teaching YES  High alert medication teaching on AUGMENTIN  (enter name of medication), Antibiotic therapy education Purpose, dose, and frequency, Complete course even if you feel better, Side effects such as nausea, vomiting, diarrhea, fungal (yeast) vaginal infections or oral thrush, Complications such as photosensitivity and rash and Severe allergic reaction, anaphylaxis, which requires immediate medical attention    Patient at risk for falls Yes:   Recommended requesting PT/OT orders due to fall risk YES:   Patient response to recommended requesting of PT/OT orders: PT DECLINED     Interdisciplinary communication with: MICHAEL RAJPUTN AND HILDA RN FOR COLLAB   Written Teaching Material Utilized: STOP LIGHT TOOL AND SOC BOOKLET   Clinician reviewed orientation to home health booklet with patient/caregiver including agency phone number, agency complaint process, state hotline number, as well as [employer] Commission's quality hotline number. Emergency preparedness reviewed with patient/caregiver in home health booklet. Consent forms signed    Patient/caregiver instructed on plan of care and ARE  agreeable to plan of care at this time.      Plan of care and admission to home health status called to attending physician. The following practitioner has agreed to sign the ongoing POC DR GARCIA, and was notified of the following: FREQUENCY AND TO

## 2024-04-05 ENCOUNTER — HOME CARE VISIT (OUTPATIENT)
Facility: HOME HEALTH | Age: 69
End: 2024-04-05
Payer: MEDICARE

## 2024-04-05 PROCEDURE — G0300 HHS/HOSPICE OF LPN EA 15 MIN: HCPCS

## 2024-04-06 VITALS
OXYGEN SATURATION: 94 % | HEART RATE: 78 BPM | RESPIRATION RATE: 18 BRPM | SYSTOLIC BLOOD PRESSURE: 138 MMHG | TEMPERATURE: 98.9 F | DIASTOLIC BLOOD PRESSURE: 80 MMHG

## 2024-04-06 ASSESSMENT — ENCOUNTER SYMPTOMS: HEMOPTYSIS: 0

## 2024-04-06 NOTE — HOME HEALTH
Subjective: Pt states he has no complaints or concerns at this time   Falls since last visit No(if yes complete the Fall Tracking Form and include bsrifallreport):   Caregiver involvement changes: Pt children assist as needed   Home health supplies by type and quantity ordered/delivered this visit include: yes     Clinician asked if patient has had any physician contact since last home care visit and patient states: NO  Clinician asked if patient has any new or changed medications and patient states:  NO   If Yes, were medications reconciled? NO   Was the certifying physician notified of changes in medications? NO     Clinical assessment (what this visit means for the patient overall and need for ongoing skilled care) and progress or lack of progress towards SPECIFIC goals: Pt continues to require SN for wound care, medication and diagnosis education     Written Teaching Material Utilized: N/A    Interdisciplinary communication with: N/A for the purpose of NA     Discharge planning as follows: When wound is 100% healed    Specific plan for next visit: Wound care

## 2024-04-08 ENCOUNTER — HOME CARE VISIT (OUTPATIENT)
Facility: HOME HEALTH | Age: 69
End: 2024-04-08
Payer: MEDICARE

## 2024-04-08 VITALS
RESPIRATION RATE: 18 BRPM | DIASTOLIC BLOOD PRESSURE: 80 MMHG | TEMPERATURE: 99.2 F | SYSTOLIC BLOOD PRESSURE: 140 MMHG | HEART RATE: 96 BPM | OXYGEN SATURATION: 95 %

## 2024-04-08 PROCEDURE — G0300 HHS/HOSPICE OF LPN EA 15 MIN: HCPCS

## 2024-04-08 ASSESSMENT — ENCOUNTER SYMPTOMS
PAIN LOCATION - PAIN QUALITY: STINGING
HEMOPTYSIS: 0

## 2024-04-08 NOTE — HOME HEALTH
Subjective: Pt states his toes on the right foot feel like someone is poking them with something sharp   Falls since last visit No(if yes complete the Fall Tracking Form and include bsrifallreport):   Caregiver involvement changes: Pt children assist as needed   Home health supplies by type and quantity ordered/delivered this visit include: none    Clinician asked if patient has had any physician contact since last home care visit and patient states: NO  Clinician asked if patient has any new or changed medications and patient states:  NO   If Yes, were medications reconciled? NO   Was the certifying physician notified of changes in medications? NO     Clinical assessment (what this visit means for the patient overall and need for ongoing skilled care) and progress or lack of progress towards SPECIFIC goals: Pt continues to require SN for wound care     Written Teaching Material Utilized: N/A    Interdisciplinary communication with: N/A for the purpose of NA     Discharge planning as follows: When wound is 100% healed    Specific plan for next visit: Wound care

## 2024-04-12 ENCOUNTER — HOME CARE VISIT (OUTPATIENT)
Facility: HOME HEALTH | Age: 69
End: 2024-04-12
Payer: MEDICARE

## 2024-04-16 ASSESSMENT — ENCOUNTER SYMPTOMS: DYSPNEA ACTIVITY LEVEL: AFTER AMBULATING 10 - 20 FT

## 2024-09-26 ENCOUNTER — APPOINTMENT (OUTPATIENT)
Facility: HOSPITAL | Age: 69
DRG: 617 | End: 2024-09-26
Payer: MEDICARE

## 2024-09-26 ENCOUNTER — HOSPITAL ENCOUNTER (INPATIENT)
Facility: HOSPITAL | Age: 69
LOS: 6 days | Discharge: HOME | DRG: 617 | End: 2024-10-02
Attending: STUDENT IN AN ORGANIZED HEALTH CARE EDUCATION/TRAINING PROGRAM | Admitting: INTERNAL MEDICINE
Payer: MEDICARE

## 2024-09-26 DIAGNOSIS — M86.9 OSTEOMYELITIS OF GREAT TOE OF RIGHT FOOT: Primary | ICD-10-CM

## 2024-09-26 PROBLEM — Z91.81 AT RISK FOR FALLING: Status: ACTIVE | Noted: 2024-09-26

## 2024-09-26 LAB
ALBUMIN SERPL-MCNC: 3.1 G/DL (ref 3.5–5)
ALBUMIN/GLOB SERPL: 0.6 (ref 1.1–2.2)
ALP SERPL-CCNC: 142 U/L (ref 45–117)
ALT SERPL-CCNC: 35 U/L (ref 12–78)
ANION GAP SERPL CALC-SCNC: 3 MMOL/L (ref 2–12)
AST SERPL-CCNC: 35 U/L (ref 15–37)
BASOPHILS # BLD: 0.1 K/UL (ref 0–0.1)
BASOPHILS NFR BLD: 1 % (ref 0–1)
BILIRUB SERPL-MCNC: 0.3 MG/DL (ref 0.2–1)
BUN SERPL-MCNC: 12 MG/DL (ref 6–20)
BUN/CREAT SERPL: 16 (ref 12–20)
CALCIUM SERPL-MCNC: 9.6 MG/DL (ref 8.5–10.1)
CHLORIDE SERPL-SCNC: 105 MMOL/L (ref 97–108)
CO2 SERPL-SCNC: 29 MMOL/L (ref 21–32)
COMMENT:: NORMAL
CREAT SERPL-MCNC: 0.76 MG/DL (ref 0.7–1.3)
CRP SERPL-MCNC: 9.08 MG/DL (ref 0–0.3)
DIFFERENTIAL METHOD BLD: ABNORMAL
EOSINOPHIL # BLD: 0.3 K/UL (ref 0–0.4)
EOSINOPHIL NFR BLD: 3 % (ref 0–7)
ERYTHROCYTE [DISTWIDTH] IN BLOOD BY AUTOMATED COUNT: 13.3 % (ref 11.5–14.5)
ERYTHROCYTE [SEDIMENTATION RATE] IN BLOOD: 77 MM/HR (ref 0–20)
GLOBULIN SER CALC-MCNC: 5.3 G/DL (ref 2–4)
GLUCOSE SERPL-MCNC: 127 MG/DL (ref 65–100)
HCT VFR BLD AUTO: 43.8 % (ref 36.6–50.3)
HGB BLD-MCNC: 14.6 G/DL (ref 12.1–17)
IMM GRANULOCYTES # BLD AUTO: 0.1 K/UL (ref 0–0.04)
IMM GRANULOCYTES NFR BLD AUTO: 1 % (ref 0–0.5)
LACTATE SERPL-SCNC: 2 MMOL/L (ref 0.4–2)
LYMPHOCYTES # BLD: 1.6 K/UL (ref 0.8–3.5)
LYMPHOCYTES NFR BLD: 18 % (ref 12–49)
MCH RBC QN AUTO: 31.4 PG (ref 26–34)
MCHC RBC AUTO-ENTMCNC: 33.3 G/DL (ref 30–36.5)
MCV RBC AUTO: 94.2 FL (ref 80–99)
MONOCYTES # BLD: 1 K/UL (ref 0–1)
MONOCYTES NFR BLD: 11 % (ref 5–13)
NEUTS SEG # BLD: 6.2 K/UL (ref 1.8–8)
NEUTS SEG NFR BLD: 66 % (ref 32–75)
NRBC # BLD: 0 K/UL (ref 0–0.01)
NRBC BLD-RTO: 0 PER 100 WBC
PLATELET # BLD AUTO: 317 K/UL (ref 150–400)
PMV BLD AUTO: 9 FL (ref 8.9–12.9)
POTASSIUM SERPL-SCNC: 3.6 MMOL/L (ref 3.5–5.1)
PROCALCITONIN SERPL-MCNC: <0.05 NG/ML
PROT SERPL-MCNC: 8.4 G/DL (ref 6.4–8.2)
RBC # BLD AUTO: 4.65 M/UL (ref 4.1–5.7)
SODIUM SERPL-SCNC: 137 MMOL/L (ref 136–145)
SPECIMEN HOLD: NORMAL
WBC # BLD AUTO: 9.3 K/UL (ref 4.1–11.1)

## 2024-09-26 PROCEDURE — 86140 C-REACTIVE PROTEIN: CPT

## 2024-09-26 PROCEDURE — 2580000003 HC RX 258: Performed by: INTERNAL MEDICINE

## 2024-09-26 PROCEDURE — 85025 COMPLETE CBC W/AUTO DIFF WBC: CPT

## 2024-09-26 PROCEDURE — 36415 COLL VENOUS BLD VENIPUNCTURE: CPT

## 2024-09-26 PROCEDURE — 6370000000 HC RX 637 (ALT 250 FOR IP): Performed by: INTERNAL MEDICINE

## 2024-09-26 PROCEDURE — 99285 EMERGENCY DEPT VISIT HI MDM: CPT

## 2024-09-26 PROCEDURE — 96367 TX/PROPH/DG ADDL SEQ IV INF: CPT

## 2024-09-26 PROCEDURE — 84145 PROCALCITONIN (PCT): CPT

## 2024-09-26 PROCEDURE — 83605 ASSAY OF LACTIC ACID: CPT

## 2024-09-26 PROCEDURE — 93005 ELECTROCARDIOGRAM TRACING: CPT | Performed by: INTERNAL MEDICINE

## 2024-09-26 PROCEDURE — 96375 TX/PRO/DX INJ NEW DRUG ADDON: CPT

## 2024-09-26 PROCEDURE — 6360000002 HC RX W HCPCS

## 2024-09-26 PROCEDURE — 96365 THER/PROPH/DIAG IV INF INIT: CPT

## 2024-09-26 PROCEDURE — 87040 BLOOD CULTURE FOR BACTERIA: CPT

## 2024-09-26 PROCEDURE — 1100000000 HC RM PRIVATE

## 2024-09-26 PROCEDURE — 99221 1ST HOSP IP/OBS SF/LOW 40: CPT | Performed by: NURSE PRACTITIONER

## 2024-09-26 PROCEDURE — 96374 THER/PROPH/DIAG INJ IV PUSH: CPT

## 2024-09-26 PROCEDURE — 6360000002 HC RX W HCPCS: Performed by: INTERNAL MEDICINE

## 2024-09-26 PROCEDURE — 2580000003 HC RX 258

## 2024-09-26 PROCEDURE — 73630 X-RAY EXAM OF FOOT: CPT

## 2024-09-26 PROCEDURE — 80053 COMPREHEN METABOLIC PANEL: CPT

## 2024-09-26 PROCEDURE — 85652 RBC SED RATE AUTOMATED: CPT

## 2024-09-26 PROCEDURE — 71045 X-RAY EXAM CHEST 1 VIEW: CPT

## 2024-09-26 RX ORDER — SODIUM CHLORIDE, SODIUM LACTATE, POTASSIUM CHLORIDE, CALCIUM CHLORIDE 600; 310; 30; 20 MG/100ML; MG/100ML; MG/100ML; MG/100ML
INJECTION, SOLUTION INTRAVENOUS CONTINUOUS
Status: DISCONTINUED | OUTPATIENT
Start: 2024-09-26 | End: 2024-09-26

## 2024-09-26 RX ORDER — METHIMAZOLE 5 MG/1
10 TABLET ORAL DAILY
Status: DISCONTINUED | OUTPATIENT
Start: 2024-09-26 | End: 2024-09-26

## 2024-09-26 RX ORDER — ONDANSETRON 2 MG/ML
4 INJECTION INTRAMUSCULAR; INTRAVENOUS EVERY 6 HOURS PRN
Status: DISCONTINUED | OUTPATIENT
Start: 2024-09-26 | End: 2024-10-02 | Stop reason: HOSPADM

## 2024-09-26 RX ORDER — LANOLIN ALCOHOL/MO/W.PET/CERES
800 CREAM (GRAM) TOPICAL DAILY
Status: DISCONTINUED | OUTPATIENT
Start: 2024-09-26 | End: 2024-10-02 | Stop reason: HOSPADM

## 2024-09-26 RX ORDER — POTASSIUM CHLORIDE 750 MG/1
40 TABLET, EXTENDED RELEASE ORAL PRN
Status: DISCONTINUED | OUTPATIENT
Start: 2024-09-26 | End: 2024-10-02 | Stop reason: HOSPADM

## 2024-09-26 RX ORDER — ACETAMINOPHEN 650 MG/1
650 SUPPOSITORY RECTAL EVERY 6 HOURS PRN
Status: DISCONTINUED | OUTPATIENT
Start: 2024-09-26 | End: 2024-10-02 | Stop reason: HOSPADM

## 2024-09-26 RX ORDER — HYDROCHLOROTHIAZIDE 25 MG/1
12.5 TABLET ORAL DAILY
Status: DISCONTINUED | OUTPATIENT
Start: 2024-09-26 | End: 2024-10-02 | Stop reason: HOSPADM

## 2024-09-26 RX ORDER — COVID-19 ANTIGEN TEST
1 KIT MISCELLANEOUS 2 TIMES DAILY
Status: ON HOLD | COMMUNITY
End: 2024-10-02 | Stop reason: HOSPADM

## 2024-09-26 RX ORDER — ACETAMINOPHEN 325 MG/1
650 TABLET ORAL EVERY 6 HOURS PRN
Status: DISCONTINUED | OUTPATIENT
Start: 2024-09-26 | End: 2024-10-02 | Stop reason: HOSPADM

## 2024-09-26 RX ORDER — AMLODIPINE BESYLATE 5 MG/1
10 TABLET ORAL DAILY
Status: DISCONTINUED | OUTPATIENT
Start: 2024-09-26 | End: 2024-10-02 | Stop reason: HOSPADM

## 2024-09-26 RX ORDER — ONDANSETRON 4 MG/1
4 TABLET, ORALLY DISINTEGRATING ORAL EVERY 8 HOURS PRN
Status: DISCONTINUED | OUTPATIENT
Start: 2024-09-26 | End: 2024-10-02 | Stop reason: HOSPADM

## 2024-09-26 RX ORDER — POTASSIUM CHLORIDE 7.45 MG/ML
10 INJECTION INTRAVENOUS PRN
Status: DISCONTINUED | OUTPATIENT
Start: 2024-09-26 | End: 2024-10-02 | Stop reason: HOSPADM

## 2024-09-26 RX ORDER — ASCORBIC ACID 500 MG
1000 TABLET ORAL DAILY
Status: DISCONTINUED | OUTPATIENT
Start: 2024-09-26 | End: 2024-10-02 | Stop reason: HOSPADM

## 2024-09-26 RX ORDER — POLYETHYLENE GLYCOL 3350 17 G/17G
17 POWDER, FOR SOLUTION ORAL DAILY PRN
Status: DISCONTINUED | OUTPATIENT
Start: 2024-09-26 | End: 2024-10-02 | Stop reason: HOSPADM

## 2024-09-26 RX ORDER — MORPHINE SULFATE 2 MG/ML
2 INJECTION, SOLUTION INTRAMUSCULAR; INTRAVENOUS ONCE
Status: COMPLETED | OUTPATIENT
Start: 2024-09-26 | End: 2024-09-26

## 2024-09-26 RX ORDER — MORPHINE SULFATE 2 MG/ML
2 INJECTION, SOLUTION INTRAMUSCULAR; INTRAVENOUS EVERY 4 HOURS PRN
Status: DISCONTINUED | OUTPATIENT
Start: 2024-09-26 | End: 2024-10-02 | Stop reason: HOSPADM

## 2024-09-26 RX ORDER — MAGNESIUM SULFATE IN WATER 40 MG/ML
2000 INJECTION, SOLUTION INTRAVENOUS PRN
Status: DISCONTINUED | OUTPATIENT
Start: 2024-09-26 | End: 2024-10-02 | Stop reason: HOSPADM

## 2024-09-26 RX ORDER — 0.9 % SODIUM CHLORIDE 0.9 %
1000 INTRAVENOUS SOLUTION INTRAVENOUS ONCE
Status: COMPLETED | OUTPATIENT
Start: 2024-09-26 | End: 2024-09-26

## 2024-09-26 RX ORDER — OXYCODONE HYDROCHLORIDE 5 MG/1
5 TABLET ORAL EVERY 4 HOURS PRN
Status: DISCONTINUED | OUTPATIENT
Start: 2024-09-26 | End: 2024-10-02 | Stop reason: HOSPADM

## 2024-09-26 RX ORDER — SODIUM CHLORIDE, SODIUM LACTATE, POTASSIUM CHLORIDE, CALCIUM CHLORIDE 600; 310; 30; 20 MG/100ML; MG/100ML; MG/100ML; MG/100ML
INJECTION, SOLUTION INTRAVENOUS CONTINUOUS
Status: DISCONTINUED | OUTPATIENT
Start: 2024-09-26 | End: 2024-09-28

## 2024-09-26 RX ORDER — IPRATROPIUM BROMIDE AND ALBUTEROL SULFATE 2.5; .5 MG/3ML; MG/3ML
1 SOLUTION RESPIRATORY (INHALATION) EVERY 4 HOURS PRN
Status: DISCONTINUED | OUTPATIENT
Start: 2024-09-26 | End: 2024-10-02 | Stop reason: HOSPADM

## 2024-09-26 RX ORDER — NICOTINE 21 MG/24HR
1 PATCH, TRANSDERMAL 24 HOURS TRANSDERMAL DAILY
Status: DISCONTINUED | OUTPATIENT
Start: 2024-09-26 | End: 2024-10-02 | Stop reason: HOSPADM

## 2024-09-26 RX ORDER — ASPIRIN 325 MG
325 TABLET ORAL DAILY
Status: DISCONTINUED | OUTPATIENT
Start: 2024-09-26 | End: 2024-10-02 | Stop reason: HOSPADM

## 2024-09-26 RX ORDER — SODIUM CHLORIDE 9 MG/ML
INJECTION, SOLUTION INTRAVENOUS PRN
Status: DISCONTINUED | OUTPATIENT
Start: 2024-09-26 | End: 2024-10-02 | Stop reason: HOSPADM

## 2024-09-26 RX ORDER — AMIODARONE HYDROCHLORIDE 200 MG/1
200 TABLET ORAL 2 TIMES DAILY
Status: DISCONTINUED | OUTPATIENT
Start: 2024-09-26 | End: 2024-09-26

## 2024-09-26 RX ORDER — ROSUVASTATIN CALCIUM 10 MG/1
20 TABLET, COATED ORAL NIGHTLY
Status: DISCONTINUED | OUTPATIENT
Start: 2024-09-26 | End: 2024-10-02 | Stop reason: HOSPADM

## 2024-09-26 RX ORDER — LANOLIN ALCOHOL/MO/W.PET/CERES
800 CREAM (GRAM) TOPICAL DAILY
COMMUNITY

## 2024-09-26 RX ORDER — SODIUM CHLORIDE 0.9 % (FLUSH) 0.9 %
5-40 SYRINGE (ML) INJECTION PRN
Status: DISCONTINUED | OUTPATIENT
Start: 2024-09-26 | End: 2024-10-02 | Stop reason: HOSPADM

## 2024-09-26 RX ORDER — SODIUM CHLORIDE 0.9 % (FLUSH) 0.9 %
5-40 SYRINGE (ML) INJECTION EVERY 12 HOURS SCHEDULED
Status: DISCONTINUED | OUTPATIENT
Start: 2024-09-26 | End: 2024-10-02 | Stop reason: HOSPADM

## 2024-09-26 RX ADMIN — FOLIC ACID TAB 400 MCG 800 MCG: 400 TAB at 18:55

## 2024-09-26 RX ADMIN — PIPERACILLIN AND TAZOBACTAM 3375 MG: 3; .375 INJECTION, POWDER, LYOPHILIZED, FOR SOLUTION INTRAVENOUS at 22:25

## 2024-09-26 RX ADMIN — HYDROCHLOROTHIAZIDE 12.5 MG: 25 TABLET ORAL at 18:55

## 2024-09-26 RX ADMIN — MORPHINE SULFATE 2 MG: 2 INJECTION, SOLUTION INTRAMUSCULAR; INTRAVENOUS at 12:51

## 2024-09-26 RX ADMIN — OXYCODONE HYDROCHLORIDE AND ACETAMINOPHEN 1000 MG: 500 TABLET ORAL at 18:55

## 2024-09-26 RX ADMIN — PIPERACILLIN AND TAZOBACTAM 4500 MG: 4; .5 INJECTION, POWDER, LYOPHILIZED, FOR SOLUTION INTRAVENOUS at 12:54

## 2024-09-26 RX ADMIN — SODIUM CHLORIDE, POTASSIUM CHLORIDE, SODIUM LACTATE AND CALCIUM CHLORIDE: 600; 310; 30; 20 INJECTION, SOLUTION INTRAVENOUS at 17:50

## 2024-09-26 RX ADMIN — SODIUM CHLORIDE: 9 INJECTION, SOLUTION INTRAVENOUS at 22:24

## 2024-09-26 RX ADMIN — HYDROMORPHONE HYDROCHLORIDE 0.5 MG: 1 INJECTION, SOLUTION INTRAMUSCULAR; INTRAVENOUS; SUBCUTANEOUS at 13:56

## 2024-09-26 RX ADMIN — ASPIRIN 325 MG: 325 TABLET ORAL at 18:55

## 2024-09-26 RX ADMIN — OXYCODONE 5 MG: 5 TABLET ORAL at 17:24

## 2024-09-26 RX ADMIN — Medication 2500 MG: at 15:00

## 2024-09-26 RX ADMIN — SODIUM CHLORIDE 1000 ML: 9 INJECTION, SOLUTION INTRAVENOUS at 12:51

## 2024-09-26 RX ADMIN — OXYCODONE 5 MG: 5 TABLET ORAL at 22:13

## 2024-09-26 RX ADMIN — ROSUVASTATIN CALCIUM 20 MG: 10 TABLET, FILM COATED ORAL at 22:13

## 2024-09-26 RX ADMIN — AMLODIPINE BESYLATE 10 MG: 5 TABLET ORAL at 18:55

## 2024-09-26 ASSESSMENT — PAIN SCALES - GENERAL
PAINLEVEL_OUTOF10: 8
PAINLEVEL_OUTOF10: 6
PAINLEVEL_OUTOF10: 8
PAINLEVEL_OUTOF10: 9
PAINLEVEL_OUTOF10: 8
PAINLEVEL_OUTOF10: 6

## 2024-09-26 ASSESSMENT — PAIN DESCRIPTION - LOCATION
LOCATION: FOOT
LOCATION: TOE (COMMENT WHICH ONE)
LOCATION: FOOT

## 2024-09-26 ASSESSMENT — PAIN - FUNCTIONAL ASSESSMENT
PAIN_FUNCTIONAL_ASSESSMENT: 0-10
PAIN_FUNCTIONAL_ASSESSMENT: ACTIVITIES ARE NOT PREVENTED
PAIN_FUNCTIONAL_ASSESSMENT: ACTIVITIES ARE NOT PREVENTED
PAIN_FUNCTIONAL_ASSESSMENT: PREVENTS OR INTERFERES SOME ACTIVE ACTIVITIES AND ADLS
PAIN_FUNCTIONAL_ASSESSMENT: PREVENTS OR INTERFERES SOME ACTIVE ACTIVITIES AND ADLS

## 2024-09-26 ASSESSMENT — PAIN DESCRIPTION - DESCRIPTORS
DESCRIPTORS: SHARP
DESCRIPTORS: THROBBING
DESCRIPTORS: THROBBING
DESCRIPTORS: ACHING;THROBBING
DESCRIPTORS: ACHING

## 2024-09-26 ASSESSMENT — PAIN DESCRIPTION - PAIN TYPE
TYPE: ACUTE PAIN
TYPE: ACUTE PAIN

## 2024-09-26 ASSESSMENT — PAIN DESCRIPTION - ORIENTATION
ORIENTATION: RIGHT

## 2024-09-26 ASSESSMENT — PAIN DESCRIPTION - FREQUENCY: FREQUENCY: CONTINUOUS

## 2024-09-26 ASSESSMENT — PAIN DESCRIPTION - ONSET: ONSET: ON-GOING

## 2024-09-26 NOTE — ED PROVIDER NOTES
Orencia order extended for her next treatment     procedures:  45 minutes.       (Please note that portions of this note were completed with a voice recognition program.  Efforts were made to edit the dictations but occasionally words are mis-transcribed.)    Leandra Isidro PA-C (electronically signed)  Emergency Attending Physician / Physician Assistant / Nurse Practitioner             Leandra Isidro PA-C  09/26/24 7636

## 2024-09-26 NOTE — CARE COORDINATION
Care Management Initial Assessment       RUR: 10% Low   Readmission? No  1st IM letter given? No   1st  letter given: No n/a        09/26/24 4226   Service Assessment   Patient Orientation Alert and Oriented   Cognition Alert   History Provided By Patient   Primary Caregiver Self   Accompanied By/Relationship n/a   Support Systems Friends/Neighbors;Children   Patient's Healthcare Decision Maker is: Legal Next of Kin   PCP Verified by CM Yes  (Pt wa sassigned a PCP with insurance change. Pt has not seen this PCP yet. Pt agreeable to having CM assistant set up initial/follow up appointment)   Prior Functional Level Independent in ADLs/IADLs   Current Functional Level Independent in ADLs/IADLs   Can patient return to prior living arrangement Yes   Ability to make needs known: Good   Would you like for me to discuss the discharge plan with any other family members/significant others, and if so, who? No   Financial Resources Medicare   Social/Functional History   Lives With Other (comment)  (rents a room from his boss)   Home Layout Two level   Home Access Stairs to enter with rails   Entrance Stairs - Number of Steps 5   Entrance Stairs - Rails Both   Bathroom Shower/Tub Walk-in shower       CM saw pt with [facility] NP to complete initial assessment. Pt was dc'd from [facility] in late March. At that time CM was unable to find accepting  agency. Pt was taught to do wound care on his foot.     CM met with pt to introduce self, role, and to conduct initial assessment. Facesheet demographics verified.     Residence: Rents a room in his boss' house  ADL: Independent   DME: None   PCP: CM to get info from pt  Verified Insurance: Yes  Emergency Contacts: Ayana Alan [phone]   Previous rehab services: No hx HH SNF or IPR   Transportation: Family     No CM needs identified at this time.   BEAR Campos    [facility]    or by [employer]

## 2024-09-26 NOTE — H&P
History and Physical    Date of Service:  9/26/2024  Primary Care Provider: Unknown, Provider  Source of information: The patient and review of EMR    Chief Complaint: Toe Pain      History of Presenting Illness:   Ranulfo Hernandez is a 69 y.o. male with past medical history significant for prostate cancer, paroxysmal atrial fibrillation on Eliquis for anticoagulation, COPD, hypertension, dyslipidemia, hyperthyroidism, anxiety disorder presented at the emergency room with right big toe infection.  Patient stated that the infection was as a result of a pedicure that he had done a couple of weeks ago.  He also reported  fever without rigors or chills.  Patient has been admitted for foot infection in the past.  He was admitted from 3/25/2024 to 3/30/2024, patient was admitted and treated for possible osteomyelitis distal phalanx right great toe as well as cellulitis of the right foot.  Patient is also complaining of pain involving the right foot which is constant, sharp, worse with movement, slight relief at rest and 6/10 in severity.  Patient was seen by podiatrist yesterday, received antibiotic shot and was advised to go to the emergency room to be admitted for intravenous antibiotics.  X-ray of the right foot done on arrival at the emergency room showed first toe necrotizing soft tissue infection and acute osteomyelitis with pathologic fracturing of the phalanges.  Inflammatory markers are elevated.  Patient was started on vancomycin and Zosyn and was referred to the hospitalist service for admission.         REVIEW OF SYSTEMS:  REVIEW OF SYSTEMS:  HEAD, EYES, EARS, NOSE AND THROAT:  No headache.  No dizziness.  No blurring of vision.  No photophobia.  RESPIRATORY SYSTEM:  No shortness of breath.  No cough.  No hemoptysis.  CARDIOVASCULAR SYSTEM:  No chest pain.  No orthopnea.  No palpitations.  GASTROINTESTINAL SYSTEM:  No nausea or vomiting.  No diarrhea.  No constipation.  GENITOURINARY SYSTEM:  No

## 2024-09-26 NOTE — ED TRIAGE NOTES
Patient is coming in for right big toe infection and to be admitted for IV antibiotics. Originated from a pedicure weeks ago. + fever yesterday. Patient sent in by Podiartist. + odor

## 2024-09-26 NOTE — ED NOTES
Awaiting vanc from pharmacy, provider made aware of pt's request for something else for pain at this time.   
[facility]  O- [phone]    [facility] ED Geriatric Consult Team  Yamile BOONE-NP   Tonie Kent CM    Patient Name: Ranulfo Hernandez  YOB: 1955    Date of Initial Consult: 9/26/2024  Reason for Consult: Geriatric medication assessment , ACP  Requesting Provider: NANNETTE Berrios      SUMMARY:   Ranulfo Hernandez is a 69 y.o. with a past history of cancer, diverticulitis, hypothyroidism and psychiatric disorders, who was arrived in the [facility] ED on 9/26/2024 from home with a diagnosis of osteomyelitis of great toe, right foot.     Current medical issues leading to Geriatric Consult  involvement include:   [] SNF Transfer  [] Prior admission within 30 days  [x] Geriatric Medication Assessment  [] Complex Medical Conditions  [] Complex Case Management and/or SDOH  [x] Goals of Care    GERIATRIC DIAGNOSES:   At risk for fall     PLAN:   Geriatric medication assessment  Discuss ACP  [] Initial consult note routed to primary continuity provider and/or primary health care team members  [x] Communicated plan of care with: ED and/or Hospital Health Care Team    ADVANCE CARE PLANNING / TREATMENT PREFERENCES:     GOALS OF CARE:  []-Comfort   [x]-Cure   []-Prolong life   [x]-Recovery from acute illness   [x]-Rehabilitation  []-Other:         TREATMENT PREFERENCES:     Patient and family's personal goals include: Patient wishes to be able to return home after hospitalization and be independent in daily needs.  Patient endorses that he still works full-time.    Important upcoming milestones or family events: None    The patient identifies the following as important for living well: Being able to perform self-care, patient states that he will no longer be going to get pedicures due to recent events.    Advance Care Planning:  [] Geriatric Team has updated the ACP Navigator with Health Care Decision Maker and Patient Capacity  Will need follow up.   The patient has appointed the following active 
Pt provided with urinal and water. Lights turned out for comfort. Denies other needs. Call light within reach.   
  Repeat LA: Time Due   Antibiotic Given: Yes  Fluid Resuscitation: Total needed , Status    VS x 2 post-fluid resuscitation:     Recommendation    Plan for next 24 hours: IV antibiotics  Additional Comments:     Pending orders   Consults ordered: IP CONSULT TO GERIATRICS    Consulted provider:      If any further questions, please call Sending RN at [phone]  Electronically signed by: Electronically signed by Chantale Hoskins RN on 9/26/2024 at 4:36 PM

## 2024-09-27 ENCOUNTER — ANESTHESIA EVENT (OUTPATIENT)
Facility: HOSPITAL | Age: 69
End: 2024-09-27
Payer: MEDICARE

## 2024-09-27 ENCOUNTER — APPOINTMENT (OUTPATIENT)
Facility: HOSPITAL | Age: 69
DRG: 617 | End: 2024-09-27
Payer: MEDICARE

## 2024-09-27 LAB
ALBUMIN SERPL-MCNC: 2.4 G/DL (ref 3.5–5)
ALBUMIN/GLOB SERPL: 0.6 (ref 1.1–2.2)
ALP SERPL-CCNC: 288 U/L (ref 45–117)
ALT SERPL-CCNC: 75 U/L (ref 12–78)
ANION GAP SERPL CALC-SCNC: 4 MMOL/L (ref 2–12)
AST SERPL-CCNC: 130 U/L (ref 15–37)
BASOPHILS # BLD: 0.1 K/UL (ref 0–0.1)
BASOPHILS NFR BLD: 1 % (ref 0–1)
BILIRUB SERPL-MCNC: 0.5 MG/DL (ref 0.2–1)
BUN SERPL-MCNC: 7 MG/DL (ref 6–20)
BUN/CREAT SERPL: 12 (ref 12–20)
CALCIUM SERPL-MCNC: 9.1 MG/DL (ref 8.5–10.1)
CHLORIDE SERPL-SCNC: 104 MMOL/L (ref 97–108)
CO2 SERPL-SCNC: 31 MMOL/L (ref 21–32)
CREAT SERPL-MCNC: 0.59 MG/DL (ref 0.7–1.3)
DIFFERENTIAL METHOD BLD: ABNORMAL
EKG ATRIAL RATE: 73 BPM
EKG DIAGNOSIS: NORMAL
EKG P AXIS: 67 DEGREES
EKG P-R INTERVAL: 150 MS
EKG Q-T INTERVAL: 358 MS
EKG QRS DURATION: 86 MS
EKG QTC CALCULATION (BAZETT): 394 MS
EKG R AXIS: 68 DEGREES
EKG T AXIS: -72 DEGREES
EKG VENTRICULAR RATE: 73 BPM
EOSINOPHIL # BLD: 0.5 K/UL (ref 0–0.4)
EOSINOPHIL NFR BLD: 7 % (ref 0–7)
ERYTHROCYTE [DISTWIDTH] IN BLOOD BY AUTOMATED COUNT: 13.4 % (ref 11.5–14.5)
EST. AVERAGE GLUCOSE BLD GHB EST-MCNC: 123 MG/DL
GLOBULIN SER CALC-MCNC: 4 G/DL (ref 2–4)
GLUCOSE SERPL-MCNC: 109 MG/DL (ref 65–100)
HBA1C MFR BLD: 5.9 % (ref 4–5.6)
HCT VFR BLD AUTO: 40.5 % (ref 36.6–50.3)
HGB BLD-MCNC: 13.1 G/DL (ref 12.1–17)
IMM GRANULOCYTES # BLD AUTO: 0.1 K/UL (ref 0–0.04)
IMM GRANULOCYTES NFR BLD AUTO: 1 % (ref 0–0.5)
LYMPHOCYTES # BLD: 1.6 K/UL (ref 0.8–3.5)
LYMPHOCYTES NFR BLD: 23 % (ref 12–49)
MAGNESIUM SERPL-MCNC: 2.4 MG/DL (ref 1.6–2.4)
MCH RBC QN AUTO: 30.8 PG (ref 26–34)
MCHC RBC AUTO-ENTMCNC: 32.3 G/DL (ref 30–36.5)
MCV RBC AUTO: 95.3 FL (ref 80–99)
MONOCYTES # BLD: 0.6 K/UL (ref 0–1)
MONOCYTES NFR BLD: 9 % (ref 5–13)
NEUTS SEG # BLD: 4 K/UL (ref 1.8–8)
NEUTS SEG NFR BLD: 59 % (ref 32–75)
NRBC # BLD: 0 K/UL (ref 0–0.01)
NRBC BLD-RTO: 0 PER 100 WBC
PHOSPHATE SERPL-MCNC: 3.3 MG/DL (ref 2.6–4.7)
PLATELET # BLD AUTO: 288 K/UL (ref 150–400)
PMV BLD AUTO: 9.3 FL (ref 8.9–12.9)
POTASSIUM SERPL-SCNC: 3.5 MMOL/L (ref 3.5–5.1)
PROT SERPL-MCNC: 6.4 G/DL (ref 6.4–8.2)
RBC # BLD AUTO: 4.25 M/UL (ref 4.1–5.7)
SODIUM SERPL-SCNC: 139 MMOL/L (ref 136–145)
TSH SERPL DL<=0.05 MIU/L-ACNC: 1.88 UIU/ML (ref 0.36–3.74)
WBC # BLD AUTO: 6.8 K/UL (ref 4.1–11.1)

## 2024-09-27 PROCEDURE — 73720 MRI LWR EXTREMITY W/O&W/DYE: CPT

## 2024-09-27 PROCEDURE — 85025 COMPLETE CBC W/AUTO DIFF WBC: CPT

## 2024-09-27 PROCEDURE — 84443 ASSAY THYROID STIM HORMONE: CPT

## 2024-09-27 PROCEDURE — A9579 GAD-BASE MR CONTRAST NOS,1ML: HCPCS | Performed by: PODIATRIST

## 2024-09-27 PROCEDURE — 84100 ASSAY OF PHOSPHORUS: CPT

## 2024-09-27 PROCEDURE — 93010 ELECTROCARDIOGRAM REPORT: CPT | Performed by: INTERNAL MEDICINE

## 2024-09-27 PROCEDURE — 83735 ASSAY OF MAGNESIUM: CPT

## 2024-09-27 PROCEDURE — 2580000003 HC RX 258: Performed by: INTERNAL MEDICINE

## 2024-09-27 PROCEDURE — 83036 HEMOGLOBIN GLYCOSYLATED A1C: CPT

## 2024-09-27 PROCEDURE — 1100000000 HC RM PRIVATE

## 2024-09-27 PROCEDURE — 6360000004 HC RX CONTRAST MEDICATION: Performed by: PODIATRIST

## 2024-09-27 PROCEDURE — 2580000003 HC RX 258: Performed by: HOSPITALIST

## 2024-09-27 PROCEDURE — 80053 COMPREHEN METABOLIC PANEL: CPT

## 2024-09-27 PROCEDURE — 6370000000 HC RX 637 (ALT 250 FOR IP): Performed by: INTERNAL MEDICINE

## 2024-09-27 PROCEDURE — 6360000002 HC RX W HCPCS: Performed by: INTERNAL MEDICINE

## 2024-09-27 RX ORDER — SODIUM CHLORIDE 9 MG/ML
INJECTION, SOLUTION INTRAVENOUS CONTINUOUS
Status: DISCONTINUED | OUTPATIENT
Start: 2024-09-27 | End: 2024-09-27

## 2024-09-27 RX ADMIN — OXYCODONE HYDROCHLORIDE AND ACETAMINOPHEN 1000 MG: 500 TABLET ORAL at 09:10

## 2024-09-27 RX ADMIN — MORPHINE SULFATE 2 MG: 2 INJECTION, SOLUTION INTRAMUSCULAR; INTRAVENOUS at 17:33

## 2024-09-27 RX ADMIN — OXYCODONE 5 MG: 5 TABLET ORAL at 10:34

## 2024-09-27 RX ADMIN — SODIUM CHLORIDE, PRESERVATIVE FREE 10 ML: 5 INJECTION INTRAVENOUS at 09:16

## 2024-09-27 RX ADMIN — HYDROCHLOROTHIAZIDE 12.5 MG: 25 TABLET ORAL at 09:11

## 2024-09-27 RX ADMIN — FOLIC ACID TAB 400 MCG 800 MCG: 400 TAB at 09:11

## 2024-09-27 RX ADMIN — SODIUM CHLORIDE: 9 INJECTION, SOLUTION INTRAVENOUS at 13:14

## 2024-09-27 RX ADMIN — OXYCODONE 5 MG: 5 TABLET ORAL at 02:33

## 2024-09-27 RX ADMIN — ROSUVASTATIN CALCIUM 20 MG: 10 TABLET, FILM COATED ORAL at 21:39

## 2024-09-27 RX ADMIN — MORPHINE SULFATE 2 MG: 2 INJECTION, SOLUTION INTRAMUSCULAR; INTRAVENOUS at 21:39

## 2024-09-27 RX ADMIN — PIPERACILLIN AND TAZOBACTAM 3375 MG: 3; .375 INJECTION, POWDER, LYOPHILIZED, FOR SOLUTION INTRAVENOUS at 22:01

## 2024-09-27 RX ADMIN — PIPERACILLIN AND TAZOBACTAM 3375 MG: 3; .375 INJECTION, POWDER, LYOPHILIZED, FOR SOLUTION INTRAVENOUS at 06:15

## 2024-09-27 RX ADMIN — PIPERACILLIN AND TAZOBACTAM 3375 MG: 3; .375 INJECTION, POWDER, LYOPHILIZED, FOR SOLUTION INTRAVENOUS at 13:11

## 2024-09-27 RX ADMIN — OXYCODONE 5 MG: 5 TABLET ORAL at 18:39

## 2024-09-27 RX ADMIN — OXYCODONE 5 MG: 5 TABLET ORAL at 15:18

## 2024-09-27 RX ADMIN — OXYCODONE 5 MG: 5 TABLET ORAL at 06:15

## 2024-09-27 RX ADMIN — AMLODIPINE BESYLATE 10 MG: 5 TABLET ORAL at 09:11

## 2024-09-27 RX ADMIN — SODIUM CHLORIDE 1500 MG: 9 INJECTION, SOLUTION INTRAVENOUS at 15:20

## 2024-09-27 RX ADMIN — GADOTERIDOL 20 ML: 279.3 INJECTION, SOLUTION INTRAVENOUS at 12:52

## 2024-09-27 RX ADMIN — SODIUM CHLORIDE 1250 MG: 9 INJECTION, SOLUTION INTRAVENOUS at 02:50

## 2024-09-27 ASSESSMENT — PAIN DESCRIPTION - DESCRIPTORS
DESCRIPTORS: ACHING;DISCOMFORT;DULL
DESCRIPTORS: ACHING;BURNING;CRUSHING
DESCRIPTORS: ACHING;DISCOMFORT;JABBING
DESCRIPTORS: ACHING;BURNING;GNAWING
DESCRIPTORS: STABBING

## 2024-09-27 ASSESSMENT — PAIN SCALES - WONG BAKER
WONGBAKER_NUMERICALRESPONSE: NO HURT

## 2024-09-27 ASSESSMENT — PAIN SCALES - GENERAL
PAINLEVEL_OUTOF10: 8
PAINLEVEL_OUTOF10: 6
PAINLEVEL_OUTOF10: 9
PAINLEVEL_OUTOF10: 1
PAINLEVEL_OUTOF10: 1
PAINLEVEL_OUTOF10: 7
PAINLEVEL_OUTOF10: 9
PAINLEVEL_OUTOF10: 8
PAINLEVEL_OUTOF10: 6
PAINLEVEL_OUTOF10: 7

## 2024-09-27 ASSESSMENT — PAIN DESCRIPTION - ORIENTATION
ORIENTATION: RIGHT
ORIENTATION: RIGHT
ORIENTATION: LEFT
ORIENTATION: LEFT
ORIENTATION: RIGHT
ORIENTATION: RIGHT
ORIENTATION: LEFT
ORIENTATION: LEFT

## 2024-09-27 ASSESSMENT — PAIN DESCRIPTION - LOCATION
LOCATION: FOOT

## 2024-09-27 NOTE — CARE COORDINATION
Transition of Care Plan:    RUR: 12%  Prior Level of Functioning: independent  Disposition: Home  If SNF or IPR: Date FOC offered:   Date FOC received:   Accepting facility:   Date authorization started with reference number:   Date authorization received and expires:   Follow up appointments: per physician recommendation   DME needed: TBD  Transportation at discharge: family  IM/IMM Medicare/ letter given: 1st 9/26  Caregiver Contact: Ayana Phillips [phone]   Barriers to discharge: Medical stability    CM reviewed chart, noted: Pt was dc'd from [facility] in late March. At that time CM was unable to find accepting  agency. Pt was taught to do wound care on his foot.     Patient rents a room from his employer [employer]. Patients mailing address is listed on the demographic sheet.     Patient had a MRI today 9/27, pending results. Patient has been NPO for an extended amount of time per the patient, he would like a tray ASAP, nursing is working on this.     CM to follow for discharge recs.     Laverne Shepherd RN/CRM

## 2024-09-27 NOTE — CONSULTS
Spoke with Ayana his emergency contact, And she states she was unaware he was at [facility] but that is fine. I told her I would like to get the MRI first -continue the antibiotics, definitely looks like the great toe has to be amputated but I want to assess the more proximal margins and see how they look. This way we can make a better decision as far as the incision and drainage right foot. Spoke with nursing also regarding this, he is currently seeing wound care nurse which is excellent.

## 2024-09-27 NOTE — CONSULTS
I saw the patient in clinic on Wednesday this week, inflammation with exposure deep tissues and severe edema hallux right foot. X-ray show great toe needs to go. It is also rubbing on the second digit causing ulceration. Hemosiderin discoloration dorsal distal midfoot right.        Recommending incision and drainage with amputation of the great toe right foot, however x-rays look stable with the rest of the bone structure but opting for  MRI with contrast would be a smart move just to see what the remaining structure looks like an MRI review, before doing the procedure. Patient continuing with IV antibiotics.          Going forward with the wound care nursing consult is a good idea also, appreciated.      Dr Antoine  [phone]

## 2024-09-28 ENCOUNTER — ANESTHESIA (OUTPATIENT)
Facility: HOSPITAL | Age: 69
End: 2024-09-28
Payer: MEDICARE

## 2024-09-28 LAB
ANION GAP SERPL CALC-SCNC: 1 MMOL/L (ref 2–12)
BASOPHILS # BLD: 0.1 K/UL (ref 0–0.1)
BASOPHILS NFR BLD: 1 % (ref 0–1)
BUN SERPL-MCNC: 7 MG/DL (ref 6–20)
BUN/CREAT SERPL: 9 (ref 12–20)
CALCIUM SERPL-MCNC: 9 MG/DL (ref 8.5–10.1)
CHLORIDE SERPL-SCNC: 106 MMOL/L (ref 97–108)
CO2 SERPL-SCNC: 33 MMOL/L (ref 21–32)
CREAT SERPL-MCNC: 0.75 MG/DL (ref 0.7–1.3)
DIFFERENTIAL METHOD BLD: ABNORMAL
EOSINOPHIL # BLD: 0.4 K/UL (ref 0–0.4)
EOSINOPHIL NFR BLD: 4 % (ref 0–7)
ERYTHROCYTE [DISTWIDTH] IN BLOOD BY AUTOMATED COUNT: 13.4 % (ref 11.5–14.5)
GLUCOSE SERPL-MCNC: 107 MG/DL (ref 65–100)
HCT VFR BLD AUTO: 40.3 % (ref 36.6–50.3)
HGB BLD-MCNC: 13.1 G/DL (ref 12.1–17)
IMM GRANULOCYTES # BLD AUTO: 0.1 K/UL (ref 0–0.04)
IMM GRANULOCYTES NFR BLD AUTO: 1 % (ref 0–0.5)
LYMPHOCYTES # BLD: 1.9 K/UL (ref 0.8–3.5)
LYMPHOCYTES NFR BLD: 18 % (ref 12–49)
MAGNESIUM SERPL-MCNC: 2.4 MG/DL (ref 1.6–2.4)
MCH RBC QN AUTO: 31.1 PG (ref 26–34)
MCHC RBC AUTO-ENTMCNC: 32.5 G/DL (ref 30–36.5)
MCV RBC AUTO: 95.7 FL (ref 80–99)
MONOCYTES # BLD: 0.7 K/UL (ref 0–1)
MONOCYTES NFR BLD: 7 % (ref 5–13)
NEUTS SEG # BLD: 7.1 K/UL (ref 1.8–8)
NEUTS SEG NFR BLD: 69 % (ref 32–75)
NRBC # BLD: 0 K/UL (ref 0–0.01)
NRBC BLD-RTO: 0 PER 100 WBC
PLATELET # BLD AUTO: 284 K/UL (ref 150–400)
PMV BLD AUTO: 9.1 FL (ref 8.9–12.9)
POTASSIUM SERPL-SCNC: 3.9 MMOL/L (ref 3.5–5.1)
RBC # BLD AUTO: 4.21 M/UL (ref 4.1–5.7)
SODIUM SERPL-SCNC: 140 MMOL/L (ref 136–145)
VANCOMYCIN SERPL-MCNC: 15.8 UG/ML
WBC # BLD AUTO: 10.1 K/UL (ref 4.1–11.1)

## 2024-09-28 PROCEDURE — 6360000002 HC RX W HCPCS

## 2024-09-28 PROCEDURE — 3700000001 HC ADD 15 MINUTES (ANESTHESIA): Performed by: PODIATRIST

## 2024-09-28 PROCEDURE — 6360000002 HC RX W HCPCS: Performed by: INTERNAL MEDICINE

## 2024-09-28 PROCEDURE — 6360000002 HC RX W HCPCS: Performed by: ANESTHESIOLOGY

## 2024-09-28 PROCEDURE — 2580000003 HC RX 258: Performed by: NURSE ANESTHETIST, CERTIFIED REGISTERED

## 2024-09-28 PROCEDURE — 80048 BASIC METABOLIC PNL TOTAL CA: CPT

## 2024-09-28 PROCEDURE — 87186 SC STD MICRODIL/AGAR DIL: CPT

## 2024-09-28 PROCEDURE — 7100000000 HC PACU RECOVERY - FIRST 15 MIN: Performed by: PODIATRIST

## 2024-09-28 PROCEDURE — 1100000000 HC RM PRIVATE

## 2024-09-28 PROCEDURE — 3700000000 HC ANESTHESIA ATTENDED CARE: Performed by: PODIATRIST

## 2024-09-28 PROCEDURE — 94640 AIRWAY INHALATION TREATMENT: CPT

## 2024-09-28 PROCEDURE — 2500000003 HC RX 250 WO HCPCS: Performed by: NURSE ANESTHETIST, CERTIFIED REGISTERED

## 2024-09-28 PROCEDURE — 83735 ASSAY OF MAGNESIUM: CPT

## 2024-09-28 PROCEDURE — 87070 CULTURE OTHR SPECIMN AEROBIC: CPT

## 2024-09-28 PROCEDURE — 2709999900 HC NON-CHARGEABLE SUPPLY: Performed by: PODIATRIST

## 2024-09-28 PROCEDURE — 0Y6P0Z0 DETACHMENT AT RIGHT 1ST TOE, COMPLETE, OPEN APPROACH: ICD-10-PCS | Performed by: PODIATRIST

## 2024-09-28 PROCEDURE — 2580000003 HC RX 258: Performed by: INTERNAL MEDICINE

## 2024-09-28 PROCEDURE — 80202 ASSAY OF VANCOMYCIN: CPT

## 2024-09-28 PROCEDURE — 2720000010 HC SURG SUPPLY STERILE: Performed by: PODIATRIST

## 2024-09-28 PROCEDURE — 87205 SMEAR GRAM STAIN: CPT

## 2024-09-28 PROCEDURE — 7100000001 HC PACU RECOVERY - ADDTL 15 MIN: Performed by: PODIATRIST

## 2024-09-28 PROCEDURE — 2580000003 HC RX 258: Performed by: ANESTHESIOLOGY

## 2024-09-28 PROCEDURE — 88311 DECALCIFY TISSUE: CPT

## 2024-09-28 PROCEDURE — 3600000002 HC SURGERY LEVEL 2 BASE: Performed by: PODIATRIST

## 2024-09-28 PROCEDURE — 87077 CULTURE AEROBIC IDENTIFY: CPT

## 2024-09-28 PROCEDURE — 36415 COLL VENOUS BLD VENIPUNCTURE: CPT

## 2024-09-28 PROCEDURE — 6370000000 HC RX 637 (ALT 250 FOR IP): Performed by: HOSPITALIST

## 2024-09-28 PROCEDURE — 87102 FUNGUS ISOLATION CULTURE: CPT

## 2024-09-28 PROCEDURE — 3600000012 HC SURGERY LEVEL 2 ADDTL 15MIN: Performed by: PODIATRIST

## 2024-09-28 PROCEDURE — 85025 COMPLETE CBC W/AUTO DIFF WBC: CPT

## 2024-09-28 PROCEDURE — 6370000000 HC RX 637 (ALT 250 FOR IP): Performed by: INTERNAL MEDICINE

## 2024-09-28 PROCEDURE — 6360000002 HC RX W HCPCS: Performed by: NURSE ANESTHETIST, CERTIFIED REGISTERED

## 2024-09-28 PROCEDURE — 88305 TISSUE EXAM BY PATHOLOGIST: CPT

## 2024-09-28 PROCEDURE — 6360000002 HC RX W HCPCS: Performed by: PODIATRIST

## 2024-09-28 PROCEDURE — 2500000003 HC RX 250 WO HCPCS: Performed by: PODIATRIST

## 2024-09-28 RX ORDER — FENTANYL CITRATE 50 UG/ML
25 INJECTION, SOLUTION INTRAMUSCULAR; INTRAVENOUS PRN
Status: DISCONTINUED | OUTPATIENT
Start: 2024-09-28 | End: 2024-09-28 | Stop reason: HOSPADM

## 2024-09-28 RX ORDER — MEPERIDINE HYDROCHLORIDE 25 MG/ML
12.5 INJECTION INTRAMUSCULAR; INTRAVENOUS; SUBCUTANEOUS EVERY 5 MIN PRN
Status: DISCONTINUED | OUTPATIENT
Start: 2024-09-28 | End: 2024-09-28 | Stop reason: HOSPADM

## 2024-09-28 RX ORDER — ONDANSETRON 2 MG/ML
4 INJECTION INTRAMUSCULAR; INTRAVENOUS
Status: DISCONTINUED | OUTPATIENT
Start: 2024-09-28 | End: 2024-09-28 | Stop reason: HOSPADM

## 2024-09-28 RX ORDER — IPRATROPIUM BROMIDE AND ALBUTEROL SULFATE 2.5; .5 MG/3ML; MG/3ML
1 SOLUTION RESPIRATORY (INHALATION) ONCE
Status: COMPLETED | OUTPATIENT
Start: 2024-09-28 | End: 2024-09-28

## 2024-09-28 RX ORDER — SODIUM CHLORIDE, SODIUM LACTATE, POTASSIUM CHLORIDE, CALCIUM CHLORIDE 600; 310; 30; 20 MG/100ML; MG/100ML; MG/100ML; MG/100ML
INJECTION, SOLUTION INTRAVENOUS CONTINUOUS
Status: DISCONTINUED | OUTPATIENT
Start: 2024-09-28 | End: 2024-09-28 | Stop reason: HOSPADM

## 2024-09-28 RX ORDER — FENTANYL CITRATE 50 UG/ML
INJECTION, SOLUTION INTRAMUSCULAR; INTRAVENOUS
Status: DISCONTINUED | OUTPATIENT
Start: 2024-09-28 | End: 2024-09-28 | Stop reason: SDUPTHER

## 2024-09-28 RX ORDER — SODIUM CHLORIDE 0.9 % (FLUSH) 0.9 %
5-40 SYRINGE (ML) INJECTION PRN
Status: DISCONTINUED | OUTPATIENT
Start: 2024-09-28 | End: 2024-09-28 | Stop reason: HOSPADM

## 2024-09-28 RX ORDER — FENTANYL CITRATE 50 UG/ML
INJECTION, SOLUTION INTRAMUSCULAR; INTRAVENOUS
Status: DISCONTINUED | OUTPATIENT
Start: 2024-09-28 | End: 2024-09-28

## 2024-09-28 RX ORDER — FENTANYL CITRATE 50 UG/ML
50 INJECTION, SOLUTION INTRAMUSCULAR; INTRAVENOUS PRN
Status: DISCONTINUED | OUTPATIENT
Start: 2024-09-28 | End: 2024-09-28 | Stop reason: HOSPADM

## 2024-09-28 RX ORDER — SODIUM CHLORIDE 9 MG/ML
INJECTION, SOLUTION INTRAVENOUS CONTINUOUS
Status: DISCONTINUED | OUTPATIENT
Start: 2024-09-28 | End: 2024-09-28 | Stop reason: HOSPADM

## 2024-09-28 RX ORDER — LABETALOL HYDROCHLORIDE 5 MG/ML
10 INJECTION, SOLUTION INTRAVENOUS
Status: DISCONTINUED | OUTPATIENT
Start: 2024-09-28 | End: 2024-09-28 | Stop reason: HOSPADM

## 2024-09-28 RX ORDER — FENTANYL CITRATE 50 UG/ML
25 INJECTION, SOLUTION INTRAMUSCULAR; INTRAVENOUS EVERY 5 MIN PRN
Status: COMPLETED | OUTPATIENT
Start: 2024-09-28 | End: 2024-09-28

## 2024-09-28 RX ORDER — MIDAZOLAM HYDROCHLORIDE 2 MG/2ML
2 INJECTION, SOLUTION INTRAMUSCULAR; INTRAVENOUS PRN
Status: DISCONTINUED | OUTPATIENT
Start: 2024-09-28 | End: 2024-09-28 | Stop reason: HOSPADM

## 2024-09-28 RX ORDER — DIPHENHYDRAMINE HYDROCHLORIDE 50 MG/ML
12.5 INJECTION INTRAMUSCULAR; INTRAVENOUS
Status: DISCONTINUED | OUTPATIENT
Start: 2024-09-28 | End: 2024-09-28 | Stop reason: HOSPADM

## 2024-09-28 RX ORDER — ONDANSETRON 2 MG/ML
INJECTION INTRAMUSCULAR; INTRAVENOUS
Status: DISCONTINUED | OUTPATIENT
Start: 2024-09-28 | End: 2024-09-28 | Stop reason: SDUPTHER

## 2024-09-28 RX ORDER — SODIUM CHLORIDE 9 MG/ML
INJECTION, SOLUTION INTRAVENOUS PRN
Status: DISCONTINUED | OUTPATIENT
Start: 2024-09-28 | End: 2024-09-28 | Stop reason: HOSPADM

## 2024-09-28 RX ORDER — ACETAMINOPHEN 325 MG/1
650 TABLET ORAL ONCE
Status: DISCONTINUED | OUTPATIENT
Start: 2024-09-28 | End: 2024-09-28 | Stop reason: HOSPADM

## 2024-09-28 RX ORDER — LIDOCAINE HYDROCHLORIDE 10 MG/ML
1 INJECTION, SOLUTION EPIDURAL; INFILTRATION; INTRACAUDAL; PERINEURAL
Status: DISCONTINUED | OUTPATIENT
Start: 2024-09-28 | End: 2024-09-28 | Stop reason: HOSPADM

## 2024-09-28 RX ORDER — NALOXONE HYDROCHLORIDE 0.4 MG/ML
INJECTION, SOLUTION INTRAMUSCULAR; INTRAVENOUS; SUBCUTANEOUS PRN
Status: DISCONTINUED | OUTPATIENT
Start: 2024-09-28 | End: 2024-09-28 | Stop reason: HOSPADM

## 2024-09-28 RX ORDER — BUPIVACAINE HYDROCHLORIDE AND EPINEPHRINE 5; 5 MG/ML; UG/ML
INJECTION, SOLUTION PERINEURAL PRN
Status: DISCONTINUED | OUTPATIENT
Start: 2024-09-28 | End: 2024-09-28 | Stop reason: HOSPADM

## 2024-09-28 RX ORDER — PROCHLORPERAZINE EDISYLATE 5 MG/ML
5 INJECTION INTRAMUSCULAR; INTRAVENOUS
Status: DISCONTINUED | OUTPATIENT
Start: 2024-09-28 | End: 2024-09-28 | Stop reason: HOSPADM

## 2024-09-28 RX ORDER — SODIUM CHLORIDE 0.9 % (FLUSH) 0.9 %
5-40 SYRINGE (ML) INJECTION EVERY 12 HOURS SCHEDULED
Status: DISCONTINUED | OUTPATIENT
Start: 2024-09-28 | End: 2024-09-28 | Stop reason: HOSPADM

## 2024-09-28 RX ORDER — MORPHINE SULFATE 2 MG/ML
INJECTION, SOLUTION INTRAMUSCULAR; INTRAVENOUS
Status: COMPLETED
Start: 2024-09-28 | End: 2024-09-28

## 2024-09-28 RX ORDER — MIDAZOLAM HYDROCHLORIDE 2 MG/2ML
2 INJECTION, SOLUTION INTRAMUSCULAR; INTRAVENOUS
Status: DISCONTINUED | OUTPATIENT
Start: 2024-09-28 | End: 2024-09-28 | Stop reason: HOSPADM

## 2024-09-28 RX ORDER — OXYCODONE HYDROCHLORIDE 5 MG/1
5 TABLET ORAL
Status: DISCONTINUED | OUTPATIENT
Start: 2024-09-28 | End: 2024-09-28 | Stop reason: HOSPADM

## 2024-09-28 RX ORDER — GLYCOPYRROLATE 0.2 MG/ML
INJECTION INTRAMUSCULAR; INTRAVENOUS
Status: COMPLETED
Start: 2024-09-28 | End: 2024-09-28

## 2024-09-28 RX ORDER — GLYCOPYRROLATE 0.2 MG/ML
0.4 INJECTION INTRAMUSCULAR; INTRAVENOUS ONCE
Status: COMPLETED | OUTPATIENT
Start: 2024-09-28 | End: 2024-09-28

## 2024-09-28 RX ORDER — MIDAZOLAM HYDROCHLORIDE 1 MG/ML
INJECTION INTRAMUSCULAR; INTRAVENOUS
Status: DISCONTINUED | OUTPATIENT
Start: 2024-09-28 | End: 2024-09-28 | Stop reason: SDUPTHER

## 2024-09-28 RX ORDER — LIDOCAINE HYDROCHLORIDE 20 MG/ML
INJECTION, SOLUTION EPIDURAL; INFILTRATION; INTRACAUDAL; PERINEURAL
Status: DISCONTINUED | OUTPATIENT
Start: 2024-09-28 | End: 2024-09-28 | Stop reason: SDUPTHER

## 2024-09-28 RX ORDER — SODIUM CHLORIDE, SODIUM LACTATE, POTASSIUM CHLORIDE, CALCIUM CHLORIDE 600; 310; 30; 20 MG/100ML; MG/100ML; MG/100ML; MG/100ML
INJECTION, SOLUTION INTRAVENOUS
Status: DISCONTINUED | OUTPATIENT
Start: 2024-09-28 | End: 2024-09-28 | Stop reason: SDUPTHER

## 2024-09-28 RX ORDER — BUPIVACAINE HYDROCHLORIDE 5 MG/ML
INJECTION, SOLUTION EPIDURAL; INTRACAUDAL PRN
Status: DISCONTINUED | OUTPATIENT
Start: 2024-09-28 | End: 2024-09-28 | Stop reason: HOSPADM

## 2024-09-28 RX ADMIN — SODIUM CHLORIDE, POTASSIUM CHLORIDE, SODIUM LACTATE AND CALCIUM CHLORIDE: 600; 310; 30; 20 INJECTION, SOLUTION INTRAVENOUS at 08:02

## 2024-09-28 RX ADMIN — MIDAZOLAM 2 MG: 1 INJECTION INTRAMUSCULAR; INTRAVENOUS at 08:06

## 2024-09-28 RX ADMIN — LIDOCAINE HYDROCHLORIDE 40 MG: 20 INJECTION, SOLUTION EPIDURAL; INFILTRATION; INTRACAUDAL; PERINEURAL at 08:11

## 2024-09-28 RX ADMIN — MORPHINE SULFATE 2 MG: 2 INJECTION, SOLUTION INTRAMUSCULAR; INTRAVENOUS at 20:26

## 2024-09-28 RX ADMIN — SODIUM CHLORIDE 1500 MG: 9 INJECTION, SOLUTION INTRAVENOUS at 15:04

## 2024-09-28 RX ADMIN — GLYCOPYRROLATE 0.4 MG: 0.2 INJECTION INTRAMUSCULAR; INTRAVENOUS at 10:02

## 2024-09-28 RX ADMIN — SODIUM CHLORIDE 1500 MG: 9 INJECTION, SOLUTION INTRAVENOUS at 02:43

## 2024-09-28 RX ADMIN — IPRATROPIUM BROMIDE AND ALBUTEROL SULFATE 1 DOSE: .5; 3 SOLUTION RESPIRATORY (INHALATION) at 15:33

## 2024-09-28 RX ADMIN — PIPERACILLIN AND TAZOBACTAM 3375 MG: 3; .375 INJECTION, POWDER, LYOPHILIZED, FOR SOLUTION INTRAVENOUS at 13:41

## 2024-09-28 RX ADMIN — MIDAZOLAM 2 MG: 1 INJECTION INTRAMUSCULAR; INTRAVENOUS at 08:02

## 2024-09-28 RX ADMIN — PROPOFOL 40 MG: 10 INJECTION, EMULSION INTRAVENOUS at 08:12

## 2024-09-28 RX ADMIN — FENTANYL CITRATE 50 MCG: 50 INJECTION, SOLUTION INTRAMUSCULAR; INTRAVENOUS at 08:08

## 2024-09-28 RX ADMIN — HYDROMORPHONE HYDROCHLORIDE 0.5 MG: 1 INJECTION, SOLUTION INTRAMUSCULAR; INTRAVENOUS; SUBCUTANEOUS at 10:07

## 2024-09-28 RX ADMIN — MORPHINE SULFATE 2 MG: 2 INJECTION, SOLUTION INTRAMUSCULAR; INTRAVENOUS at 11:39

## 2024-09-28 RX ADMIN — OXYCODONE 5 MG: 5 TABLET ORAL at 02:37

## 2024-09-28 RX ADMIN — FENTANYL CITRATE 25 MCG: 50 INJECTION INTRAMUSCULAR; INTRAVENOUS at 10:25

## 2024-09-28 RX ADMIN — HYDROMORPHONE HYDROCHLORIDE 0.5 MG: 1 INJECTION, SOLUTION INTRAMUSCULAR; INTRAVENOUS; SUBCUTANEOUS at 09:47

## 2024-09-28 RX ADMIN — PIPERACILLIN AND TAZOBACTAM 3375 MG: 3; .375 INJECTION, POWDER, LYOPHILIZED, FOR SOLUTION INTRAVENOUS at 04:43

## 2024-09-28 RX ADMIN — FENTANYL CITRATE 25 MCG: 50 INJECTION INTRAMUSCULAR; INTRAVENOUS at 10:15

## 2024-09-28 RX ADMIN — FENTANYL CITRATE 25 MCG: 50 INJECTION INTRAMUSCULAR; INTRAVENOUS at 10:00

## 2024-09-28 RX ADMIN — MORPHINE SULFATE 2 MG: 2 INJECTION, SOLUTION INTRAMUSCULAR; INTRAVENOUS at 16:31

## 2024-09-28 RX ADMIN — FENTANYL CITRATE 25 MCG: 50 INJECTION, SOLUTION INTRAMUSCULAR; INTRAVENOUS at 08:14

## 2024-09-28 RX ADMIN — SODIUM CHLORIDE 25 ML: 9 INJECTION, SOLUTION INTRAVENOUS at 15:03

## 2024-09-28 RX ADMIN — ONDANSETRON 4 MG: 2 INJECTION INTRAMUSCULAR; INTRAVENOUS at 08:22

## 2024-09-28 RX ADMIN — MIDAZOLAM 1 MG: 1 INJECTION INTRAMUSCULAR; INTRAVENOUS at 08:10

## 2024-09-28 RX ADMIN — SODIUM CHLORIDE, PRESERVATIVE FREE 10 ML: 5 INJECTION INTRAVENOUS at 20:26

## 2024-09-28 RX ADMIN — PIPERACILLIN AND TAZOBACTAM 3375 MG: 3; .375 INJECTION, POWDER, LYOPHILIZED, FOR SOLUTION INTRAVENOUS at 22:28

## 2024-09-28 RX ADMIN — OXYCODONE 5 MG: 5 TABLET ORAL at 13:26

## 2024-09-28 RX ADMIN — OXYCODONE 5 MG: 5 TABLET ORAL at 22:12

## 2024-09-28 RX ADMIN — SODIUM CHLORIDE, POTASSIUM CHLORIDE, SODIUM LACTATE AND CALCIUM CHLORIDE: 600; 310; 30; 20 INJECTION, SOLUTION INTRAVENOUS at 07:45

## 2024-09-28 RX ADMIN — SODIUM CHLORIDE: 9 INJECTION, SOLUTION INTRAVENOUS at 22:27

## 2024-09-28 RX ADMIN — FENTANYL CITRATE 25 MCG: 50 INJECTION INTRAMUSCULAR; INTRAVENOUS at 09:50

## 2024-09-28 RX ADMIN — OXYCODONE 5 MG: 5 TABLET ORAL at 06:05

## 2024-09-28 RX ADMIN — PROPOFOL 75 MCG/KG/MIN: 10 INJECTION, EMULSION INTRAVENOUS at 08:13

## 2024-09-28 RX ADMIN — ROSUVASTATIN CALCIUM 20 MG: 10 TABLET, FILM COATED ORAL at 22:12

## 2024-09-28 RX ADMIN — FENTANYL CITRATE 25 MCG: 50 INJECTION, SOLUTION INTRAMUSCULAR; INTRAVENOUS at 08:12

## 2024-09-28 ASSESSMENT — PAIN DESCRIPTION - DESCRIPTORS
DESCRIPTORS: THROBBING
DESCRIPTORS: STABBING
DESCRIPTORS: THROBBING
DESCRIPTORS: THROBBING
DESCRIPTORS: STABBING

## 2024-09-28 ASSESSMENT — PAIN DESCRIPTION - LOCATION
LOCATION: FOOT

## 2024-09-28 ASSESSMENT — PAIN SCALES - GENERAL
PAINLEVEL_OUTOF10: 9
PAINLEVEL_OUTOF10: 5
PAINLEVEL_OUTOF10: 5
PAINLEVEL_OUTOF10: 7
PAINLEVEL_OUTOF10: 8
PAINLEVEL_OUTOF10: 7
PAINLEVEL_OUTOF10: 9
PAINLEVEL_OUTOF10: 10
PAINLEVEL_OUTOF10: 7
PAINLEVEL_OUTOF10: 7
PAINLEVEL_OUTOF10: 9
PAINLEVEL_OUTOF10: 8
PAINLEVEL_OUTOF10: 9

## 2024-09-28 ASSESSMENT — PAIN DESCRIPTION - ORIENTATION
ORIENTATION: RIGHT

## 2024-09-28 ASSESSMENT — PAIN SCALES - WONG BAKER: WONGBAKER_NUMERICALRESPONSE: NO HURT

## 2024-09-28 ASSESSMENT — PAIN - FUNCTIONAL ASSESSMENT: PAIN_FUNCTIONAL_ASSESSMENT: NONE - DENIES PAIN

## 2024-09-28 ASSESSMENT — LIFESTYLE VARIABLES: SMOKING_STATUS: 1

## 2024-09-28 NOTE — OP NOTE
[address]  [address]                            OPERATIVE REPORT      PATIENT NAME: MARIA ANTONIA HOLLOWAY           : 1955  MED REC NO: [id number]                       ROOM: [facility]  ACCOUNT NO: [id number]                       ADMIT DATE: 2024  PROVIDER: Juan Pablo Antoine MD    DATE OF SERVICE:  2024    PREOPERATIVE DIAGNOSES:  Osteomyelitis, great toe, right foot.    POSTOPERATIVE DIAGNOSES:  Osteomyelitis, great toe, right foot.    PROCEDURES PERFORMED:  Incision and drainage with amputation, great toe, right foot.    SURGEON:  Juan Pablo Antoine MD    ASSISTANT:  None.    ANESTHESIA:  Sedation with local.    ESTIMATED BLOOD LOSS:  Minimal.    SPECIMENS REMOVED:  Necrotic great toe with bone exposure throughout great toe right foot.    INTRAOPERATIVE FINDINGS:  Necrotic and exposed bone great toe right foot with stable first metatarsal right foot     COMPLICATIONS:  None apparent.    IMPLANTS:  None.    INDICATIONS:  Septic joint with gangrene great toe right foot.    DESCRIPTION OF PROCEDURE:  The patient was taken to the operating room, placed in the operating room table in supine position.  Anesthesiologist administered anesthesia followed by local infiltrative block as mentioned above.  The right foot was then prepped and draped in the usual aseptic technique.  An Esmarch bandage was utilized at midfoot level for exsanguination, right side.    Access was now gained towards the great toe base proximal phalanx region where a curvilinear incision, full-thickness flap was carried out.  Care was taken not to put pressure on the viable remaining proximal flaps.  The digit was now disarticulated from the first metatarsophalangeal joint.  Abundant region of gangrenous development throughout the interphalangeal joint as well as exposure of bone more proximally was seen through the skin.  Great toe was removed and sent to Pathology.

## 2024-09-29 LAB
ANION GAP SERPL CALC-SCNC: 4 MMOL/L (ref 2–12)
BASOPHILS # BLD: 0.1 K/UL (ref 0–0.1)
BASOPHILS NFR BLD: 1 % (ref 0–1)
BUN SERPL-MCNC: 11 MG/DL (ref 6–20)
BUN/CREAT SERPL: 13 (ref 12–20)
CALCIUM SERPL-MCNC: 8.6 MG/DL (ref 8.5–10.1)
CHLORIDE SERPL-SCNC: 107 MMOL/L (ref 97–108)
CO2 SERPL-SCNC: 30 MMOL/L (ref 21–32)
CREAT SERPL-MCNC: 0.86 MG/DL (ref 0.7–1.3)
DIFFERENTIAL METHOD BLD: ABNORMAL
EOSINOPHIL # BLD: 0.3 K/UL (ref 0–0.4)
EOSINOPHIL NFR BLD: 4 % (ref 0–7)
ERYTHROCYTE [DISTWIDTH] IN BLOOD BY AUTOMATED COUNT: 13.6 % (ref 11.5–14.5)
GLUCOSE SERPL-MCNC: 103 MG/DL (ref 65–100)
HCT VFR BLD AUTO: 40.1 % (ref 36.6–50.3)
HGB BLD-MCNC: 12.9 G/DL (ref 12.1–17)
IMM GRANULOCYTES # BLD AUTO: 0.1 K/UL (ref 0–0.04)
IMM GRANULOCYTES NFR BLD AUTO: 1 % (ref 0–0.5)
LYMPHOCYTES # BLD: 1.6 K/UL (ref 0.8–3.5)
LYMPHOCYTES NFR BLD: 18 % (ref 12–49)
MAGNESIUM SERPL-MCNC: 2.6 MG/DL (ref 1.6–2.4)
MCH RBC QN AUTO: 31.1 PG (ref 26–34)
MCHC RBC AUTO-ENTMCNC: 32.2 G/DL (ref 30–36.5)
MCV RBC AUTO: 96.6 FL (ref 80–99)
MONOCYTES # BLD: 0.7 K/UL (ref 0–1)
MONOCYTES NFR BLD: 8 % (ref 5–13)
NEUTS SEG # BLD: 6.3 K/UL (ref 1.8–8)
NEUTS SEG NFR BLD: 68 % (ref 32–75)
NRBC # BLD: 0 K/UL (ref 0–0.01)
NRBC BLD-RTO: 0 PER 100 WBC
PLATELET # BLD AUTO: 293 K/UL (ref 150–400)
PMV BLD AUTO: 9.2 FL (ref 8.9–12.9)
POTASSIUM SERPL-SCNC: 4.2 MMOL/L (ref 3.5–5.1)
RBC # BLD AUTO: 4.15 M/UL (ref 4.1–5.7)
SODIUM SERPL-SCNC: 141 MMOL/L (ref 136–145)
WBC # BLD AUTO: 9 K/UL (ref 4.1–11.1)

## 2024-09-29 PROCEDURE — 83735 ASSAY OF MAGNESIUM: CPT

## 2024-09-29 PROCEDURE — 6360000002 HC RX W HCPCS: Performed by: INTERNAL MEDICINE

## 2024-09-29 PROCEDURE — 6370000000 HC RX 637 (ALT 250 FOR IP): Performed by: INTERNAL MEDICINE

## 2024-09-29 PROCEDURE — 2580000003 HC RX 258: Performed by: HOSPITALIST

## 2024-09-29 PROCEDURE — 99223 1ST HOSP IP/OBS HIGH 75: CPT | Performed by: INTERNAL MEDICINE

## 2024-09-29 PROCEDURE — 85025 COMPLETE CBC W/AUTO DIFF WBC: CPT

## 2024-09-29 PROCEDURE — 1100000000 HC RM PRIVATE

## 2024-09-29 PROCEDURE — 36415 COLL VENOUS BLD VENIPUNCTURE: CPT

## 2024-09-29 PROCEDURE — 2580000003 HC RX 258: Performed by: INTERNAL MEDICINE

## 2024-09-29 PROCEDURE — 6360000002 HC RX W HCPCS: Performed by: HOSPITALIST

## 2024-09-29 PROCEDURE — 80048 BASIC METABOLIC PNL TOTAL CA: CPT

## 2024-09-29 RX ADMIN — PIPERACILLIN AND TAZOBACTAM 3375 MG: 3; .375 INJECTION, POWDER, LYOPHILIZED, FOR SOLUTION INTRAVENOUS at 20:40

## 2024-09-29 RX ADMIN — PIPERACILLIN AND TAZOBACTAM 3375 MG: 3; .375 INJECTION, POWDER, LYOPHILIZED, FOR SOLUTION INTRAVENOUS at 05:24

## 2024-09-29 RX ADMIN — OXYCODONE 5 MG: 5 TABLET ORAL at 07:05

## 2024-09-29 RX ADMIN — SODIUM CHLORIDE 1500 MG: 9 INJECTION, SOLUTION INTRAVENOUS at 03:04

## 2024-09-29 RX ADMIN — SODIUM CHLORIDE, PRESERVATIVE FREE 10 ML: 5 INJECTION INTRAVENOUS at 20:48

## 2024-09-29 RX ADMIN — SODIUM CHLORIDE, PRESERVATIVE FREE 10 ML: 5 INJECTION INTRAVENOUS at 09:52

## 2024-09-29 RX ADMIN — ASPIRIN 325 MG: 325 TABLET ORAL at 10:00

## 2024-09-29 RX ADMIN — ROSUVASTATIN CALCIUM 20 MG: 10 TABLET, FILM COATED ORAL at 20:40

## 2024-09-29 RX ADMIN — PIPERACILLIN AND TAZOBACTAM 3375 MG: 3; .375 INJECTION, POWDER, LYOPHILIZED, FOR SOLUTION INTRAVENOUS at 12:59

## 2024-09-29 RX ADMIN — OXYCODONE 5 MG: 5 TABLET ORAL at 12:49

## 2024-09-29 RX ADMIN — OXYCODONE 5 MG: 5 TABLET ORAL at 02:59

## 2024-09-29 RX ADMIN — MORPHINE SULFATE 2 MG: 2 INJECTION, SOLUTION INTRAMUSCULAR; INTRAVENOUS at 14:27

## 2024-09-29 RX ADMIN — MORPHINE SULFATE 2 MG: 2 INJECTION, SOLUTION INTRAMUSCULAR; INTRAVENOUS at 20:37

## 2024-09-29 RX ADMIN — OXYCODONE 5 MG: 5 TABLET ORAL at 23:00

## 2024-09-29 RX ADMIN — HYDROCHLOROTHIAZIDE 12.5 MG: 25 TABLET ORAL at 09:54

## 2024-09-29 RX ADMIN — MORPHINE SULFATE 2 MG: 2 INJECTION, SOLUTION INTRAMUSCULAR; INTRAVENOUS at 09:49

## 2024-09-29 RX ADMIN — ACETAMINOPHEN 650 MG: 325 TABLET ORAL at 23:00

## 2024-09-29 RX ADMIN — SODIUM CHLORIDE: 9 INJECTION, SOLUTION INTRAVENOUS at 20:38

## 2024-09-29 RX ADMIN — AMLODIPINE BESYLATE 10 MG: 5 TABLET ORAL at 09:54

## 2024-09-29 RX ADMIN — SODIUM CHLORIDE 25 ML: 9 INJECTION, SOLUTION INTRAVENOUS at 12:58

## 2024-09-29 RX ADMIN — OXYCODONE HYDROCHLORIDE AND ACETAMINOPHEN 1000 MG: 500 TABLET ORAL at 09:54

## 2024-09-29 RX ADMIN — SODIUM CHLORIDE 1250 MG: 9 INJECTION, SOLUTION INTRAVENOUS at 19:00

## 2024-09-29 RX ADMIN — OXYCODONE 5 MG: 5 TABLET ORAL at 18:51

## 2024-09-29 RX ADMIN — MORPHINE SULFATE 2 MG: 2 INJECTION, SOLUTION INTRAMUSCULAR; INTRAVENOUS at 00:31

## 2024-09-29 RX ADMIN — FOLIC ACID TAB 400 MCG 800 MCG: 400 TAB at 09:54

## 2024-09-29 ASSESSMENT — PAIN DESCRIPTION - DESCRIPTORS
DESCRIPTORS: ACHING;STABBING
DESCRIPTORS: THROBBING;STABBING
DESCRIPTORS: ACHING
DESCRIPTORS: STABBING
DESCRIPTORS: THROBBING
DESCRIPTORS: STABBING
DESCRIPTORS: THROBBING
DESCRIPTORS: STABBING;THROBBING
DESCRIPTORS: THROBBING;STABBING

## 2024-09-29 ASSESSMENT — PAIN SCALES - GENERAL
PAINLEVEL_OUTOF10: 9
PAINLEVEL_OUTOF10: 9
PAINLEVEL_OUTOF10: 10
PAINLEVEL_OUTOF10: 0
PAINLEVEL_OUTOF10: 9
PAINLEVEL_OUTOF10: 8
PAINLEVEL_OUTOF10: 10
PAINLEVEL_OUTOF10: 8
PAINLEVEL_OUTOF10: 0
PAINLEVEL_OUTOF10: 8
PAINLEVEL_OUTOF10: 10
PAINLEVEL_OUTOF10: 9

## 2024-09-29 ASSESSMENT — PAIN DESCRIPTION - LOCATION
LOCATION: FOOT

## 2024-09-29 ASSESSMENT — PAIN DESCRIPTION - ORIENTATION
ORIENTATION: RIGHT

## 2024-09-29 ASSESSMENT — PAIN DESCRIPTION - PAIN TYPE: TYPE: ACUTE PAIN;SURGICAL PAIN

## 2024-09-29 NOTE — CONSULTS
Infectious Disease Consult Note    Assessment / Plan:       68 y/o with COPD, prostate CA, A-fib, HTN, hyperthyroid, HLD recent admission @ [facility] 3/2024 for OM right toe s/p I/D admitted with persistent right 1st toe wound infection.    Vanco/Zosyn pending ID and sensitivity of OR cultures.    If no growth of MRSA by tomorrow can stop Vancomycin.    Follow pathology to see if residual infection in right 1st MTP present or not.    Strongly encouraged avoidance of EtOH and smoking.    Blood glucose control, defer to primary.         Reason for Consult: Osteomyelitis  Date of Consultation: September 29, 2024  Date of Admission: 9/26/2024  Referring Physician: Dr. Vazquez    HPI:    68 y/o male with EtOH abuse, active smoker, COPD, prostate CA, A-fib, HTN, HLD recent admission @ [facility] 3/2024 for OM right toe admitted for persistent right 1st toe ulcer with infection.  Started initially after having a pedicure earlier this year with persistent wound.  Hospitalized 3/25-3/20/24 @ [facility] for right 1st toe wound, found to have osteomyelitis on MRI with sinus tract formation.  Underwent I/D, bone biopsy with + acute osteomyelitis.  ID was not involved in patient's care at that time and discharged with PO Augmentin 875mg-125mg BID to complete a 10 day course for which he attested to adherence to therapy.    Admitted for persistent wound with cellulitic changes, subjective fever, destructive OM changes on right 1st toe.  Underwent amputation of right 1st toe on 9/28 with firm and solid metatarsal head to right 1st MTP region.  + blood flow noted to tissue.    On Vanco/Zosyn post-operatively with culture so far pending, GNR and skin elizabeth so far.      Past Medical History:  Past Medical History:   Diagnosis Date    Cancer (HCC)     PROSTATE    Diverticulitis     Hyperthyroidism 5/15/2019    Psychiatric disorder     ANXIETY (SITUATIONAL)         Surgical History:  Past Surgical History:   Procedure Laterality Date    GI  2003

## 2024-09-30 ENCOUNTER — APPOINTMENT (OUTPATIENT)
Facility: HOSPITAL | Age: 69
DRG: 617 | End: 2024-09-30
Attending: PODIATRIST
Payer: MEDICARE

## 2024-09-30 LAB
ANION GAP SERPL CALC-SCNC: 3 MMOL/L (ref 2–12)
BACTERIA SPEC CULT: NORMAL
BASOPHILS # BLD: 0.1 K/UL (ref 0–0.1)
BASOPHILS NFR BLD: 1 % (ref 0–1)
BUN SERPL-MCNC: 10 MG/DL (ref 6–20)
BUN/CREAT SERPL: 11 (ref 12–20)
CALCIUM SERPL-MCNC: 9.1 MG/DL (ref 8.5–10.1)
CHLORIDE SERPL-SCNC: 104 MMOL/L (ref 97–108)
CO2 SERPL-SCNC: 33 MMOL/L (ref 21–32)
CREAT SERPL-MCNC: 0.89 MG/DL (ref 0.7–1.3)
DIFFERENTIAL METHOD BLD: ABNORMAL
ECHO BSA: 2.27 M2
EOSINOPHIL # BLD: 0.3 K/UL (ref 0–0.4)
EOSINOPHIL NFR BLD: 4 % (ref 0–7)
ERYTHROCYTE [DISTWIDTH] IN BLOOD BY AUTOMATED COUNT: 13.6 % (ref 11.5–14.5)
GLUCOSE SERPL-MCNC: 113 MG/DL (ref 65–100)
HCT VFR BLD AUTO: 42.8 % (ref 36.6–50.3)
HGB BLD-MCNC: 13.6 G/DL (ref 12.1–17)
IMM GRANULOCYTES # BLD AUTO: 0.1 K/UL (ref 0–0.04)
IMM GRANULOCYTES NFR BLD AUTO: 1 % (ref 0–0.5)
LYMPHOCYTES # BLD: 1.7 K/UL (ref 0.8–3.5)
LYMPHOCYTES NFR BLD: 21 % (ref 12–49)
MAGNESIUM SERPL-MCNC: 2.6 MG/DL (ref 1.6–2.4)
MCH RBC QN AUTO: 31.2 PG (ref 26–34)
MCHC RBC AUTO-ENTMCNC: 31.8 G/DL (ref 30–36.5)
MCV RBC AUTO: 98.2 FL (ref 80–99)
MONOCYTES # BLD: 0.7 K/UL (ref 0–1)
MONOCYTES NFR BLD: 9 % (ref 5–13)
NEUTS SEG # BLD: 5.1 K/UL (ref 1.8–8)
NEUTS SEG NFR BLD: 64 % (ref 32–75)
NRBC # BLD: 0 K/UL (ref 0–0.01)
NRBC BLD-RTO: 0 PER 100 WBC
PLATELET # BLD AUTO: 285 K/UL (ref 150–400)
PMV BLD AUTO: 9.4 FL (ref 8.9–12.9)
POTASSIUM SERPL-SCNC: 4.8 MMOL/L (ref 3.5–5.1)
RBC # BLD AUTO: 4.36 M/UL (ref 4.1–5.7)
SERVICE CMNT-IMP: NORMAL
SODIUM SERPL-SCNC: 140 MMOL/L (ref 136–145)
VANCOMYCIN SERPL-MCNC: 13.3 UG/ML
VAS LEFT ABI: 1.11
VAS LEFT ARM BP: 127 MMHG
VAS LEFT DORSALIS PEDIS BP: 141 MMHG
VAS LEFT PTA BP: 135 MMHG
VAS RIGHT ABI: 1.06
VAS RIGHT ARM BP: 119 MMHG
VAS RIGHT DORSALIS PEDIS BP: 135 MMHG
VAS RIGHT PTA BP: 134 MMHG
WBC # BLD AUTO: 7.9 K/UL (ref 4.1–11.1)

## 2024-09-30 PROCEDURE — 80048 BASIC METABOLIC PNL TOTAL CA: CPT

## 2024-09-30 PROCEDURE — 85025 COMPLETE CBC W/AUTO DIFF WBC: CPT

## 2024-09-30 PROCEDURE — 6370000000 HC RX 637 (ALT 250 FOR IP): Performed by: INTERNAL MEDICINE

## 2024-09-30 PROCEDURE — 99231 SBSQ HOSP IP/OBS SF/LOW 25: CPT | Performed by: NURSE PRACTITIONER

## 2024-09-30 PROCEDURE — 97161 PT EVAL LOW COMPLEX 20 MIN: CPT

## 2024-09-30 PROCEDURE — 2580000003 HC RX 258: Performed by: INTERNAL MEDICINE

## 2024-09-30 PROCEDURE — 2700000000 HC OXYGEN THERAPY PER DAY

## 2024-09-30 PROCEDURE — 6360000002 HC RX W HCPCS: Performed by: INTERNAL MEDICINE

## 2024-09-30 PROCEDURE — 83735 ASSAY OF MAGNESIUM: CPT

## 2024-09-30 PROCEDURE — 97535 SELF CARE MNGMENT TRAINING: CPT

## 2024-09-30 PROCEDURE — 93922 UPR/L XTREMITY ART 2 LEVELS: CPT

## 2024-09-30 PROCEDURE — 1100000000 HC RM PRIVATE

## 2024-09-30 PROCEDURE — 80202 ASSAY OF VANCOMYCIN: CPT

## 2024-09-30 PROCEDURE — 97165 OT EVAL LOW COMPLEX 30 MIN: CPT

## 2024-09-30 PROCEDURE — 94760 N-INVAS EAR/PLS OXIMETRY 1: CPT

## 2024-09-30 RX ADMIN — AMLODIPINE BESYLATE 10 MG: 5 TABLET ORAL at 08:45

## 2024-09-30 RX ADMIN — SODIUM CHLORIDE, PRESERVATIVE FREE 10 ML: 5 INJECTION INTRAVENOUS at 08:45

## 2024-09-30 RX ADMIN — ACETAMINOPHEN 650 MG: 325 TABLET ORAL at 18:20

## 2024-09-30 RX ADMIN — PIPERACILLIN AND TAZOBACTAM 3375 MG: 3; .375 INJECTION, POWDER, LYOPHILIZED, FOR SOLUTION INTRAVENOUS at 20:52

## 2024-09-30 RX ADMIN — PIPERACILLIN AND TAZOBACTAM 3375 MG: 3; .375 INJECTION, POWDER, LYOPHILIZED, FOR SOLUTION INTRAVENOUS at 05:47

## 2024-09-30 RX ADMIN — HYDROCHLOROTHIAZIDE 12.5 MG: 25 TABLET ORAL at 08:45

## 2024-09-30 RX ADMIN — OXYCODONE HYDROCHLORIDE AND ACETAMINOPHEN 1000 MG: 500 TABLET ORAL at 08:44

## 2024-09-30 RX ADMIN — ROSUVASTATIN CALCIUM 20 MG: 10 TABLET, FILM COATED ORAL at 20:52

## 2024-09-30 RX ADMIN — PIPERACILLIN AND TAZOBACTAM 3375 MG: 3; .375 INJECTION, POWDER, LYOPHILIZED, FOR SOLUTION INTRAVENOUS at 13:04

## 2024-09-30 RX ADMIN — MORPHINE SULFATE 2 MG: 2 INJECTION, SOLUTION INTRAMUSCULAR; INTRAVENOUS at 09:00

## 2024-09-30 RX ADMIN — MORPHINE SULFATE 2 MG: 2 INJECTION, SOLUTION INTRAMUSCULAR; INTRAVENOUS at 02:34

## 2024-09-30 RX ADMIN — FOLIC ACID TAB 400 MCG 800 MCG: 400 TAB at 08:45

## 2024-09-30 RX ADMIN — MORPHINE SULFATE 2 MG: 2 INJECTION, SOLUTION INTRAMUSCULAR; INTRAVENOUS at 16:51

## 2024-09-30 RX ADMIN — ACETAMINOPHEN 650 MG: 325 TABLET ORAL at 12:10

## 2024-09-30 RX ADMIN — OXYCODONE 5 MG: 5 TABLET ORAL at 14:30

## 2024-09-30 RX ADMIN — OXYCODONE 5 MG: 5 TABLET ORAL at 10:20

## 2024-09-30 RX ADMIN — MORPHINE SULFATE 2 MG: 2 INJECTION, SOLUTION INTRAMUSCULAR; INTRAVENOUS at 13:00

## 2024-09-30 RX ADMIN — OXYCODONE 5 MG: 5 TABLET ORAL at 22:31

## 2024-09-30 RX ADMIN — ASPIRIN 325 MG: 325 TABLET ORAL at 08:45

## 2024-09-30 RX ADMIN — ACETAMINOPHEN 650 MG: 325 TABLET ORAL at 05:48

## 2024-09-30 RX ADMIN — SODIUM CHLORIDE, PRESERVATIVE FREE 10 ML: 5 INJECTION INTRAVENOUS at 20:53

## 2024-09-30 RX ADMIN — OXYCODONE 5 MG: 5 TABLET ORAL at 05:44

## 2024-09-30 RX ADMIN — OXYCODONE 5 MG: 5 TABLET ORAL at 18:20

## 2024-09-30 RX ADMIN — MORPHINE SULFATE 2 MG: 2 INJECTION, SOLUTION INTRAMUSCULAR; INTRAVENOUS at 20:55

## 2024-09-30 ASSESSMENT — PAIN DESCRIPTION - ORIENTATION
ORIENTATION: RIGHT

## 2024-09-30 ASSESSMENT — PAIN DESCRIPTION - ONSET
ONSET: ON-GOING
ONSET: ON-GOING

## 2024-09-30 ASSESSMENT — PAIN SCALES - GENERAL
PAINLEVEL_OUTOF10: 7
PAINLEVEL_OUTOF10: 8
PAINLEVEL_OUTOF10: 8
PAINLEVEL_OUTOF10: 7
PAINLEVEL_OUTOF10: 9
PAINLEVEL_OUTOF10: 10
PAINLEVEL_OUTOF10: 7
PAINLEVEL_OUTOF10: 4
PAINLEVEL_OUTOF10: 0
PAINLEVEL_OUTOF10: 7
PAINLEVEL_OUTOF10: 8
PAINLEVEL_OUTOF10: 8
PAINLEVEL_OUTOF10: 5
PAINLEVEL_OUTOF10: 7
PAINLEVEL_OUTOF10: 7
PAINLEVEL_OUTOF10: 8
PAINLEVEL_OUTOF10: 0
PAINLEVEL_OUTOF10: 9

## 2024-09-30 ASSESSMENT — PAIN DESCRIPTION - PAIN TYPE
TYPE: ACUTE PAIN;SURGICAL PAIN
TYPE: ACUTE PAIN;SURGICAL PAIN

## 2024-09-30 ASSESSMENT — PAIN DESCRIPTION - LOCATION
LOCATION: FOOT

## 2024-09-30 ASSESSMENT — PAIN DESCRIPTION - DESCRIPTORS
DESCRIPTORS: THROBBING
DESCRIPTORS: ACHING
DESCRIPTORS: THROBBING
DESCRIPTORS: THROBBING
DESCRIPTORS: ACHING
DESCRIPTORS: ACHING

## 2024-09-30 ASSESSMENT — PAIN DESCRIPTION - FREQUENCY
FREQUENCY: CONTINUOUS
FREQUENCY: CONTINUOUS

## 2024-09-30 NOTE — CARE COORDINATION
Transition of Care Plan:    Cultures pending, ID plan pending, POD2 for toe.    During March admission at [facility], Pt was unable to find accepting HH agency. Pt was taught to do wound care on his foot.     Patient rents a room from his employer [employer]. Patients mailing address is listed on the demographic sheet.     PT and OT to eval; orders were placed 9/30    --  RUR: 8%  Prior Level of Functioning: independent  Disposition: Home  If SNF or IPR: Date FOC offered:   Date FOC received:   Accepting facility:   Date authorization started with reference number:   Date authorization received and expires:   Follow up appointments: per physician recommendation   DME needed: TBD  Transportation at discharge: family  IM/IMM Medicare/ letter given: 1st 9/26  Caregiver Contact: Ayana Phillips [phone]   Barriers to discharge: Medical stability, cultures pending, ID plan

## 2024-10-01 LAB
ANION GAP SERPL CALC-SCNC: 3 MMOL/L (ref 2–12)
BACTERIA SPEC CULT: NORMAL
BACTERIA SPEC CULT: NORMAL
BASOPHILS # BLD: 0.1 K/UL (ref 0–0.1)
BASOPHILS NFR BLD: 1 % (ref 0–1)
BUN SERPL-MCNC: 14 MG/DL (ref 6–20)
BUN/CREAT SERPL: 13 (ref 12–20)
CALCIUM SERPL-MCNC: 8.9 MG/DL (ref 8.5–10.1)
CHLORIDE SERPL-SCNC: 106 MMOL/L (ref 97–108)
CO2 SERPL-SCNC: 30 MMOL/L (ref 21–32)
CREAT SERPL-MCNC: 1.04 MG/DL (ref 0.7–1.3)
DIFFERENTIAL METHOD BLD: ABNORMAL
EOSINOPHIL # BLD: 0.4 K/UL (ref 0–0.4)
EOSINOPHIL NFR BLD: 5 % (ref 0–7)
ERYTHROCYTE [DISTWIDTH] IN BLOOD BY AUTOMATED COUNT: 13.5 % (ref 11.5–14.5)
GLUCOSE SERPL-MCNC: 97 MG/DL (ref 65–100)
HCT VFR BLD AUTO: 42.1 % (ref 36.6–50.3)
HGB BLD-MCNC: 13.6 G/DL (ref 12.1–17)
IMM GRANULOCYTES # BLD AUTO: 0 K/UL (ref 0–0.04)
IMM GRANULOCYTES NFR BLD AUTO: 1 % (ref 0–0.5)
LYMPHOCYTES # BLD: 1.6 K/UL (ref 0.8–3.5)
LYMPHOCYTES NFR BLD: 24 % (ref 12–49)
MAGNESIUM SERPL-MCNC: 2.7 MG/DL (ref 1.6–2.4)
MCH RBC QN AUTO: 31.2 PG (ref 26–34)
MCHC RBC AUTO-ENTMCNC: 32.3 G/DL (ref 30–36.5)
MCV RBC AUTO: 96.6 FL (ref 80–99)
MONOCYTES # BLD: 0.6 K/UL (ref 0–1)
MONOCYTES NFR BLD: 9 % (ref 5–13)
NEUTS SEG # BLD: 4.1 K/UL (ref 1.8–8)
NEUTS SEG NFR BLD: 60 % (ref 32–75)
NRBC # BLD: 0 K/UL (ref 0–0.01)
NRBC BLD-RTO: 0 PER 100 WBC
PLATELET # BLD AUTO: 282 K/UL (ref 150–400)
PMV BLD AUTO: 9.6 FL (ref 8.9–12.9)
POTASSIUM SERPL-SCNC: 4.4 MMOL/L (ref 3.5–5.1)
RBC # BLD AUTO: 4.36 M/UL (ref 4.1–5.7)
SERVICE CMNT-IMP: NORMAL
SERVICE CMNT-IMP: NORMAL
SODIUM SERPL-SCNC: 139 MMOL/L (ref 136–145)
WBC # BLD AUTO: 6.8 K/UL (ref 4.1–11.1)

## 2024-10-01 PROCEDURE — 97530 THERAPEUTIC ACTIVITIES: CPT

## 2024-10-01 PROCEDURE — 97116 GAIT TRAINING THERAPY: CPT

## 2024-10-01 PROCEDURE — 83735 ASSAY OF MAGNESIUM: CPT

## 2024-10-01 PROCEDURE — 2580000003 HC RX 258: Performed by: INTERNAL MEDICINE

## 2024-10-01 PROCEDURE — 36415 COLL VENOUS BLD VENIPUNCTURE: CPT

## 2024-10-01 PROCEDURE — 6360000002 HC RX W HCPCS: Performed by: INTERNAL MEDICINE

## 2024-10-01 PROCEDURE — 1100000000 HC RM PRIVATE

## 2024-10-01 PROCEDURE — 80048 BASIC METABOLIC PNL TOTAL CA: CPT

## 2024-10-01 PROCEDURE — 6370000000 HC RX 637 (ALT 250 FOR IP): Performed by: INTERNAL MEDICINE

## 2024-10-01 PROCEDURE — 85025 COMPLETE CBC W/AUTO DIFF WBC: CPT

## 2024-10-01 RX ADMIN — OXYCODONE HYDROCHLORIDE AND ACETAMINOPHEN 1000 MG: 500 TABLET ORAL at 08:49

## 2024-10-01 RX ADMIN — SODIUM CHLORIDE, PRESERVATIVE FREE 10 ML: 5 INJECTION INTRAVENOUS at 20:42

## 2024-10-01 RX ADMIN — PIPERACILLIN AND TAZOBACTAM 3375 MG: 3; .375 INJECTION, POWDER, LYOPHILIZED, FOR SOLUTION INTRAVENOUS at 14:00

## 2024-10-01 RX ADMIN — ACETAMINOPHEN 650 MG: 325 TABLET ORAL at 16:05

## 2024-10-01 RX ADMIN — FOLIC ACID TAB 400 MCG 800 MCG: 400 TAB at 08:49

## 2024-10-01 RX ADMIN — ROSUVASTATIN CALCIUM 20 MG: 10 TABLET, FILM COATED ORAL at 20:40

## 2024-10-01 RX ADMIN — SODIUM CHLORIDE, PRESERVATIVE FREE 10 ML: 5 INJECTION INTRAVENOUS at 08:49

## 2024-10-01 RX ADMIN — AMLODIPINE BESYLATE 10 MG: 5 TABLET ORAL at 08:49

## 2024-10-01 RX ADMIN — ASPIRIN 325 MG: 325 TABLET ORAL at 08:49

## 2024-10-01 RX ADMIN — OXYCODONE 5 MG: 5 TABLET ORAL at 20:40

## 2024-10-01 RX ADMIN — MORPHINE SULFATE 2 MG: 2 INJECTION, SOLUTION INTRAMUSCULAR; INTRAVENOUS at 18:09

## 2024-10-01 RX ADMIN — ACETAMINOPHEN 650 MG: 325 TABLET ORAL at 03:06

## 2024-10-01 RX ADMIN — MORPHINE SULFATE 2 MG: 2 INJECTION, SOLUTION INTRAMUSCULAR; INTRAVENOUS at 13:55

## 2024-10-01 RX ADMIN — MORPHINE SULFATE 2 MG: 2 INJECTION, SOLUTION INTRAMUSCULAR; INTRAVENOUS at 09:40

## 2024-10-01 RX ADMIN — ACETAMINOPHEN 650 MG: 325 TABLET ORAL at 22:22

## 2024-10-01 RX ADMIN — ACETAMINOPHEN 650 MG: 325 TABLET ORAL at 08:55

## 2024-10-01 RX ADMIN — OXYCODONE 5 MG: 5 TABLET ORAL at 11:30

## 2024-10-01 RX ADMIN — OXYCODONE 5 MG: 5 TABLET ORAL at 16:05

## 2024-10-01 RX ADMIN — PIPERACILLIN AND TAZOBACTAM 3375 MG: 3; .375 INJECTION, POWDER, LYOPHILIZED, FOR SOLUTION INTRAVENOUS at 20:42

## 2024-10-01 RX ADMIN — MORPHINE SULFATE 2 MG: 2 INJECTION, SOLUTION INTRAMUSCULAR; INTRAVENOUS at 01:21

## 2024-10-01 RX ADMIN — HYDROCHLOROTHIAZIDE 12.5 MG: 25 TABLET ORAL at 08:49

## 2024-10-01 RX ADMIN — MORPHINE SULFATE 2 MG: 2 INJECTION, SOLUTION INTRAMUSCULAR; INTRAVENOUS at 05:26

## 2024-10-01 RX ADMIN — MORPHINE SULFATE 2 MG: 2 INJECTION, SOLUTION INTRAMUSCULAR; INTRAVENOUS at 22:19

## 2024-10-01 RX ADMIN — OXYCODONE 5 MG: 5 TABLET ORAL at 03:03

## 2024-10-01 RX ADMIN — OXYCODONE 5 MG: 5 TABLET ORAL at 07:30

## 2024-10-01 RX ADMIN — PIPERACILLIN AND TAZOBACTAM 3375 MG: 3; .375 INJECTION, POWDER, LYOPHILIZED, FOR SOLUTION INTRAVENOUS at 05:29

## 2024-10-01 ASSESSMENT — PAIN SCALES - GENERAL
PAINLEVEL_OUTOF10: 4
PAINLEVEL_OUTOF10: 4
PAINLEVEL_OUTOF10: 8
PAINLEVEL_OUTOF10: 7
PAINLEVEL_OUTOF10: 8
PAINLEVEL_OUTOF10: 5
PAINLEVEL_OUTOF10: 8
PAINLEVEL_OUTOF10: 8
PAINLEVEL_OUTOF10: 7
PAINLEVEL_OUTOF10: 7
PAINLEVEL_OUTOF10: 9
PAINLEVEL_OUTOF10: 10
PAINLEVEL_OUTOF10: 7
PAINLEVEL_OUTOF10: 8
PAINLEVEL_OUTOF10: 7
PAINLEVEL_OUTOF10: 8
PAINLEVEL_OUTOF10: 7
PAINLEVEL_OUTOF10: 4
PAINLEVEL_OUTOF10: 6

## 2024-10-01 ASSESSMENT — PAIN DESCRIPTION - ORIENTATION
ORIENTATION: RIGHT

## 2024-10-01 ASSESSMENT — PAIN DESCRIPTION - FREQUENCY: FREQUENCY: CONTINUOUS

## 2024-10-01 ASSESSMENT — PAIN DESCRIPTION - DESCRIPTORS
DESCRIPTORS: THROBBING
DESCRIPTORS: ACHING

## 2024-10-01 ASSESSMENT — PAIN DESCRIPTION - LOCATION
LOCATION: FOOT

## 2024-10-01 ASSESSMENT — PAIN DESCRIPTION - PAIN TYPE: TYPE: ACUTE PAIN;SURGICAL PAIN

## 2024-10-01 NOTE — CARE COORDINATION
Transition of Care Plan:    Cultures pending, ID plan pending, POD3 for toe.    During March admission at [facility], Pt was unable to find accepting  agency. Pt was taught to do wound care on his foot.     Patient rents a room from his employer [employer]. Patients mailing address is listed on the demographic sheet.     --  RUR: 8%  Prior Level of Functioning: independent  Disposition: Home  If SNF or IPR: Date FOC offered:   Date FOC received:   Accepting facility:   Date authorization started with reference number:   Date authorization received and expires:   Follow up appointments: per physician recommendation   DME needed: TBD  Transportation at discharge: family  IM/IMM Medicare/ letter given: 1st 9/26  Caregiver Contact: Ayana Phillips [phone]   Barriers to discharge: Medical stability, cultures pending, ID plan      Laverne Shepherd RN/CRM

## 2024-10-02 VITALS
TEMPERATURE: 97.5 F | WEIGHT: 211.64 LBS | RESPIRATION RATE: 16 BRPM | DIASTOLIC BLOOD PRESSURE: 72 MMHG | SYSTOLIC BLOOD PRESSURE: 136 MMHG | OXYGEN SATURATION: 94 % | HEART RATE: 76 BPM | HEIGHT: 76 IN | BODY MASS INDEX: 25.77 KG/M2

## 2024-10-02 LAB
BACTERIA SPEC CULT: ABNORMAL
GRAM STN SPEC: ABNORMAL
GRAM STN SPEC: ABNORMAL
SERVICE CMNT-IMP: ABNORMAL

## 2024-10-02 PROCEDURE — 99231 SBSQ HOSP IP/OBS SF/LOW 25: CPT | Performed by: NURSE PRACTITIONER

## 2024-10-02 PROCEDURE — 6370000000 HC RX 637 (ALT 250 FOR IP): Performed by: INTERNAL MEDICINE

## 2024-10-02 PROCEDURE — 6360000002 HC RX W HCPCS: Performed by: INTERNAL MEDICINE

## 2024-10-02 PROCEDURE — 6370000000 HC RX 637 (ALT 250 FOR IP): Performed by: NURSE PRACTITIONER

## 2024-10-02 PROCEDURE — 2580000003 HC RX 258: Performed by: INTERNAL MEDICINE

## 2024-10-02 RX ORDER — LEVOFLOXACIN 750 MG/1
750 TABLET, FILM COATED ORAL DAILY
Qty: 13 TABLET | Refills: 0 | Status: SHIPPED | OUTPATIENT
Start: 2024-10-03 | End: 2024-10-16

## 2024-10-02 RX ORDER — LEVOFLOXACIN 750 MG/1
750 TABLET, FILM COATED ORAL DAILY
Status: DISCONTINUED | OUTPATIENT
Start: 2024-10-02 | End: 2024-10-02 | Stop reason: HOSPADM

## 2024-10-02 RX ORDER — OXYCODONE HYDROCHLORIDE 5 MG/1
5 TABLET ORAL EVERY 4 HOURS PRN
Qty: 16 TABLET | Refills: 0 | Status: SHIPPED | OUTPATIENT
Start: 2024-10-02 | End: 2024-10-05

## 2024-10-02 RX ADMIN — OXYCODONE HYDROCHLORIDE AND ACETAMINOPHEN 1000 MG: 500 TABLET ORAL at 08:58

## 2024-10-02 RX ADMIN — OXYCODONE 5 MG: 5 TABLET ORAL at 15:59

## 2024-10-02 RX ADMIN — LEVOFLOXACIN 750 MG: 750 TABLET, FILM COATED ORAL at 11:36

## 2024-10-02 RX ADMIN — OXYCODONE 5 MG: 5 TABLET ORAL at 01:42

## 2024-10-02 RX ADMIN — MORPHINE SULFATE 2 MG: 2 INJECTION, SOLUTION INTRAMUSCULAR; INTRAVENOUS at 08:59

## 2024-10-02 RX ADMIN — AMLODIPINE BESYLATE 10 MG: 5 TABLET ORAL at 08:57

## 2024-10-02 RX ADMIN — ACETAMINOPHEN 650 MG: 325 TABLET ORAL at 13:50

## 2024-10-02 RX ADMIN — ACETAMINOPHEN 650 MG: 325 TABLET ORAL at 06:09

## 2024-10-02 RX ADMIN — OXYCODONE 5 MG: 5 TABLET ORAL at 06:09

## 2024-10-02 RX ADMIN — PIPERACILLIN AND TAZOBACTAM 3375 MG: 3; .375 INJECTION, POWDER, LYOPHILIZED, FOR SOLUTION INTRAVENOUS at 06:11

## 2024-10-02 RX ADMIN — FOLIC ACID TAB 400 MCG 800 MCG: 400 TAB at 08:58

## 2024-10-02 RX ADMIN — MORPHINE SULFATE 2 MG: 2 INJECTION, SOLUTION INTRAMUSCULAR; INTRAVENOUS at 03:34

## 2024-10-02 RX ADMIN — HYDROCHLOROTHIAZIDE 12.5 MG: 25 TABLET ORAL at 08:58

## 2024-10-02 RX ADMIN — OXYCODONE 5 MG: 5 TABLET ORAL at 11:36

## 2024-10-02 RX ADMIN — SODIUM CHLORIDE, PRESERVATIVE FREE 10 ML: 5 INJECTION INTRAVENOUS at 09:00

## 2024-10-02 RX ADMIN — ASPIRIN 325 MG: 325 TABLET ORAL at 08:58

## 2024-10-02 ASSESSMENT — PAIN SCALES - GENERAL
PAINLEVEL_OUTOF10: 5
PAINLEVEL_OUTOF10: 8
PAINLEVEL_OUTOF10: 3
PAINLEVEL_OUTOF10: 8
PAINLEVEL_OUTOF10: 3
PAINLEVEL_OUTOF10: 8
PAINLEVEL_OUTOF10: 4
PAINLEVEL_OUTOF10: 8
PAINLEVEL_OUTOF10: 8
PAINLEVEL_OUTOF10: 0
PAINLEVEL_OUTOF10: 8
PAINLEVEL_OUTOF10: 7

## 2024-10-02 ASSESSMENT — PAIN DESCRIPTION - DESCRIPTORS
DESCRIPTORS: ACHING
DESCRIPTORS: ACHING;SORE
DESCRIPTORS: ACHING
DESCRIPTORS: ACHING
DESCRIPTORS: ACHING;SORE
DESCRIPTORS: ACHING
DESCRIPTORS: ACHING

## 2024-10-02 ASSESSMENT — PAIN DESCRIPTION - ORIENTATION
ORIENTATION: RIGHT

## 2024-10-02 ASSESSMENT — PAIN DESCRIPTION - LOCATION
LOCATION: FOOT
LOCATION: TOE (COMMENT WHICH ONE)
LOCATION: FOOT
LOCATION: FOOT

## 2024-10-02 NOTE — DISCHARGE SUMMARY
Discharge Summary       PATIENT ID: Ranulfo Hernandez  MRN: [id number]   YOB: 1955    DATE OF ADMISSION: 9/26/2024 12:22 PM    DATE OF DISCHARGE: 10/2/2024   PRIMARY CARE PROVIDER: Unknown, Provider     ATTENDING PHYSICIAN: Taylor  DISCHARGING PROVIDER: Nicol Vazquez MD    To contact this individual call [phone] and ask the  to page.  If unavailable ask to be transferred the Adult Hospitalist Department.    CONSULTATIONS: IP CONSULT TO GERIATRICS  IP CONSULT TO PHARMACY  IP CONSULT TO PODIATRY  IP WOUND CARE NURSE CONSULT TO EVAL  IP CONSULT TO INFECTIOUS DISEASES  IP WOUND CARE NURSE CONSULT TO EVAL  IP CONSULT HOME HEALTH    PROCEDURES/SURGERIES: Procedure(s):  INCISION AND DRAINAGE WITH AMPUTATION GREAT TOE RIGHT FOOT     ADMITTING DIAGNOSES & HOSPITAL COURSE:   OM if R 1st toe  Paroxysmal afib  HTN  Dyslipidemia  Hyperthyroidism  Tobacco use  History of prostate CA  COPD    69 y.o. male with past medical history significant for prostate cancer, paroxysmal atrial fibrillation on Eliquis for anticoagulation, COPD, hypertension, dyslipidemia, hyperthyroidism, anxiety disorder presented at the emergency room with right big toe infection. Patient stated that the infection was as a result of a pedicure that he had done a couple of weeks ago. He was admitted from 3/25/2024 to 3/30/2024, patient was admitted and treated for possible osteomyelitis distal phalanx right great toe as well as cellulitis of the right foot.     -s/p amputation and I&D of great R toe  -s/p IV abx, wound cx w/ morganella, ID rec 2 weeks of levofloxacin    Paroxysmal atrial fibrillation POA: not anticoagulated     Hyperglycemia: A1c 5.9%  Essential hypertension  Dyslipidemia  Hyperthyroidism:  -TSH wnl    DISCHARGE DIAGNOSES / PLAN:      FOLLOW UP APPOINTMENTS:    Follow-up Information       Follow up With Specialties Details Why Contact Juan Pablo Perez DPM Podiatry Follow up  [address]

## 2024-10-02 NOTE — WOUND CARE
WOCN Note:     Reconsult for reassess right foot and toes.  s/p Right great toe amputation on 9.28.24.  Dr Antoine, podiatry following.    Chart reviewed.  Assessed in 570/01.    Ranulfo Hernandez is a 69 y.o. y/o male who presented for Osteomyelitis of great toe of right foot  Admitted on 9/26/2024; LOS: 5    Past Medical History:   Diagnosis Date    Cancer (HCC)     PROSTATE    Diverticulitis     Hyperthyroidism 5/15/2019    Psychiatric disorder     ANXIETY (SITUATIONAL)       Lab Results   Component Value Date/Time    WBC 6.8 10/01/2024 06:28 AM        Tobacco Use      Smoking status: Every Day        Packs/day: 1.00        Types: Cigarettes      Smokeless tobacco: Never     DIET ONE TIME MESSAGE;  ADULT DIET; Regular     Assessment:   Patient is A&O x 3, communicative, continent and mobile.    Bed: [facility]  Patient medicated for pain by RN.   Heels offloaded with pillows.   Heels intact without erythema.     Wound Assessment  Right great toe amputation site with sutures.  Scant serosang drainage; no erythema or odor; tender to touch.  POA right second toe, full thickness wound:  2 x 2 x 0.1 cm; 80% dry maroon scab 20% red wound bed; small serous drainage; no odor.  Tx:  cleansed with saline; applied xeroform and dry dressing.        3.  Right second toe, dry maroon scab surrounded by macerated skin.  4.  Right third toe, dry maroon scab; 10% is unroofed with red wound bed.  Tx:  cleansed with saline; applied xeroform and dry dressing.      Wound, Pressure Prevention & Skin Care Recommendations:    Minimize layers of linen/pads under patient to optimize support surface.    2.  Turn/reposition approximately every 2 hours and offload heels.   3.  Manage moisture/ Keep skin folds clean and dry/minimize brief usage.  4. Right toe wounds:  Daily cleanse with saline; apply xeroform and dry dressing.    Discussed above plan with patient & RN.    Transition of Care:   Plan to follow as needed while admitted to 
Wound care for discharge:    Xeroform working for the digits, however I do now want to decrease the moisture at the distal wound site amputation site, recommending silver alginate to distal amputation site, with sterile Kerlix and Ace wrap, continue with q.48 hours Xeroform to the digits.    -Q.48 hours silver alginate amputation site right foot. 4 x 4, Kerlix And Ace wrap    -Xeroform 2 digits q.48 hours external Kerlix    -Continue with surgical shoe heel walking right foot, still limiting activity, staying out of work activities until approved  
dressing.    Discussed above plan with patient & RN.    Transition of Care:   Plan to follow as needed while admitted to hospital.    VALENTINO AguilarN RN [facility] Inpatient Wound Care  Available on [facility]  Office [phone]

## 2024-10-02 NOTE — PLAN OF CARE
Problem: Discharge Planning  Goal: Discharge to home or other facility with appropriate resources  10/1/2024 2045 by Sita Blue RN  Outcome: Progressing  10/1/2024 1036 by Marily Pryor RN  Outcome: Progressing  Flowsheets (Taken 10/1/2024 0845)  Discharge to home or other facility with appropriate resources: Identify barriers to discharge with patient and caregiver     Problem: Pain  Goal: Verbalizes/displays adequate comfort level or baseline comfort level  10/1/2024 2045 by Sita Blue RN  Outcome: Progressing  10/1/2024 1036 by Marily Pryor RN  Outcome: Progressing  Flowsheets (Taken 10/1/2024 0845)  Verbalizes/displays adequate comfort level or baseline comfort level: Encourage patient to monitor pain and request assistance     Problem: Safety - Adult  Goal: Free from fall injury  10/1/2024 2045 by Sita Blue RN  Outcome: Progressing  10/1/2024 1036 by Marily Pryor RN  Outcome: Progressing     Problem: Physical Therapy - Adult  Goal: By Discharge: Performs mobility at highest level of function for planned discharge setting.  See evaluation for individualized goals.  Description: FUNCTIONAL STATUS PRIOR TO ADMISSION: Patient was independent and active without use of DME.    HOME SUPPORT PRIOR TO ADMISSION: The patient lived alone, rents apartment of [facility].    Physical Therapy Goals  Initiated 9/30/2024  1.  Patient will move from supine to sit and sit to supine in bed with modified independence within 7 day(s).    2.  Patient will perform sit to stand with modified independence within 7 day(s).  3.  Patient will transfer from bed to chair and chair to bed with modified independence using the least restrictive device within 7 day(s).  4.  Patient will ambulate with modified independence for 150 feet with the least restrictive device within 7 day(s).   5.  Patient will ascend/descend 10 stairs with both handrail(s) with modified independence within 7 day(s).    10/1/2024 1942 by Tova Lu, 
  Problem: Discharge Planning  Goal: Discharge to home or other facility with appropriate resources  9/30/2024 2233 by Sita Blue RN  Outcome: Progressing  9/30/2024 0952 by Marily Pryor RN  Outcome: Progressing  Flowsheets (Taken 9/30/2024 0840)  Discharge to home or other facility with appropriate resources: Identify barriers to discharge with patient and caregiver     Problem: Pain  Goal: Verbalizes/displays adequate comfort level or baseline comfort level  9/30/2024 2233 by Sita Blue RN  Outcome: Progressing  9/30/2024 0952 by Marily Pryor RN  Outcome: Progressing  Flowsheets  Taken 9/30/2024 0930  Verbalizes/displays adequate comfort level or baseline comfort level: Encourage patient to monitor pain and request assistance  Taken 9/30/2024 0840  Verbalizes/displays adequate comfort level or baseline comfort level: Encourage patient to monitor pain and request assistance     Problem: Safety - Adult  Goal: Free from fall injury  9/30/2024 2233 by Sita Blue RN  Outcome: Progressing  9/30/2024 0952 by Marily Pryor RN  Outcome: Progressing     Problem: Physical Therapy - Adult  Goal: By Discharge: Performs mobility at highest level of function for planned discharge setting.  See evaluation for individualized goals.  Description: FUNCTIONAL STATUS PRIOR TO ADMISSION: Patient was independent and active without use of DME.    HOME SUPPORT PRIOR TO ADMISSION: The patient lived alone, rents apartment of [facility].    Physical Therapy Goals  Initiated 9/30/2024  1.  Patient will move from supine to sit and sit to supine in bed with modified independence within 7 day(s).    2.  Patient will perform sit to stand with modified independence within 7 day(s).  3.  Patient will transfer from bed to chair and chair to bed with modified independence using the least restrictive device within 7 day(s).  4.  Patient will ambulate with modified independence for 150 feet with the least restrictive device within 7 
  Problem: Discharge Planning  Goal: Discharge to home or other facility with appropriate resources  Outcome: Progressing     
  Problem: Discharge Planning  Goal: Discharge to home or other facility with appropriate resources  Outcome: Progressing     Problem: Pain  Goal: Verbalizes/displays adequate comfort level or baseline comfort level  Outcome: Progressing     Problem: Safety - Adult  Goal: Free from fall injury  Outcome: Progressing     
  Problem: Discharge Planning  Goal: Discharge to home or other facility with appropriate resources  Outcome: Progressing  Flowsheets (Taken 9/28/2024 1150)  Discharge to home or other facility with appropriate resources: Identify discharge learning needs (meds, wound care, etc)     Problem: Pain  Goal: Verbalizes/displays adequate comfort level or baseline comfort level  Outcome: Progressing     Problem: Safety - Adult  Goal: Free from fall injury  Outcome: Progressing     
  Problem: Discharge Planning  Goal: Discharge to home or other facility with appropriate resources  Outcome: Progressing  Flowsheets (Taken 9/29/2024 1001)  Discharge to home or other facility with appropriate resources: Identify discharge learning needs (meds, wound care, etc)     Problem: Pain  Goal: Verbalizes/displays adequate comfort level or baseline comfort level  Outcome: Progressing     Problem: Safety - Adult  Goal: Free from fall injury  Outcome: Progressing     
  Problem: Physical Therapy - Adult  Goal: By Discharge: Performs mobility at highest level of function for planned discharge setting.  See evaluation for individualized goals.  Description: FUNCTIONAL STATUS PRIOR TO ADMISSION: Patient was independent and active without use of DME.    HOME SUPPORT PRIOR TO ADMISSION: The patient lived alone, rents apartment of [facility].    Physical Therapy Goals  Initiated 9/30/2024  1.  Patient will move from supine to sit and sit to supine in bed with modified independence within 7 day(s).    2.  Patient will perform sit to stand with modified independence within 7 day(s).  3.  Patient will transfer from bed to chair and chair to bed with modified independence using the least restrictive device within 7 day(s).  4.  Patient will ambulate with modified independence for 150 feet with the least restrictive device within 7 day(s).   5.  Patient will ascend/descend 10 stairs with both handrail(s) with modified independence within 7 day(s).    Outcome: Progressing   PHYSICAL THERAPY TREATMENT    Patient: Ranulfo Hernandez (69 y.o. male)  Date: 10/1/2024  Diagnosis: Osteomyelitis of great toe of right foot [M86.9] Osteomyelitis of great toe of right foot  Procedure(s) (LRB):  INCISION AND DRAINAGE WITH AMPUTATION GREAT TOE RIGHT FOOT (Right) 3 Days Post-Op  Precautions: Weight Bearing Right Lower Extremity Weight Bearing: Partial Weight Bearing                    ASSESSMENT:  Patient continues to benefit from skilled PT services and is progressing towards goals. Pt donned left tennis shoe and right Darco shoe with min assist to tighten dorsal strap on right shoe.  Pt increased amb distance to 125 feet and performed stairs with contact guard.  Pt need further teaching and encouragement to elevated right foot higher than heart to decrease edema and promote healing.         PLAN:  Patient continues to benefit from skilled intervention to address the above impairments.  Continue treatment 
                                                                                                                                                                                                                          Activity Tolerance:   Fair     After treatment:   Patient left in no apparent distress sitting up in chair and Call bell within reach    COMMUNICATION/EDUCATION:   The patient's plan of care was discussed with: occupational therapist and registered nurse    Patient Education  Education Given To: Patient  Education Provided: Role of Therapy;Plan of Care;Transfer Training;Equipment;Precautions  Education Method: Demonstration;Verbal  Barriers to Learning: None  Education Outcome: Verbalized understanding;Continued education needed    Thank you for this referral.  June Regalado, PT  Minutes: 12      Physical Therapy Evaluation Charge Determination   History Examination Presentation Decision-Making   HIGH Complexity :3+ comorbidities / personal factors will impact the outcome/ POC  LOW Complexity : 1-2 Standardized tests and measures addressing body structure, function, activity limitation and / or participation in recreation  MEDIUM Complexity : Evolving with changing characteristics  AM-PAC  LOW    Based on the above components, the patient evaluation is determined to be of the following complexity level: Low    
[email or web address]://[email or web address]    Pain Rating:  3/10   Pain Intervention(s):   repositioning    Activity Tolerance:   Good    After treatment:   Patient left in no apparent distress in bed and Call bell within reach    COMMUNICATION/EDUCATION:   The patient's plan of care was discussed with: registered nurse    Patient Education  Education Given To: Patient  Education Provided: Role of Therapy;ADL Adaptive Strategies;Transfer Training;Precautions  Education Method: Demonstration;Verbal  Barriers to Learning: None  Education Outcome: Verbalized understanding    Thank you for this referral.  Alayna Hart OT  Minutes: 22    Occupational Therapy Evaluation Charge Determination   History Examination Decision-Making   LOW Complexity : Brief history review  LOW Complexity: 1-3 Performance deficits relating to physical, cognitive, or psychosocial skills that result in activity limitations and/or participation restrictions LOW Complexity: No comorbidities that affect functional and  no verbal  or physical assist needed to complete eval tasks   Based on the above components, the patient evaluation is determined to be of the following complexity level: Low

## 2024-10-02 NOTE — PROGRESS NOTES
[facility]                                                                                          Hospitalist Progress Note  Nicol Vazquez MD  Office Phone: [phone]        Date of Service:  10/1/2024  NAME:  Ranulfo Hernandez  :  1955  MRN:  [id number]       Admission Summary:   69 y.o. male with past medical history significant for prostate cancer, paroxysmal atrial fibrillation on Eliquis for anticoagulation, COPD, hypertension, dyslipidemia, hyperthyroidism, anxiety disorder presented at the emergency room with right big toe infection.  Patient stated that the infection was as a result of a pedicure that he had done a couple of weeks ago.  He also reported  fever without rigors or chills.  Patient has been admitted for foot infection in the past.  He was admitted from 3/25/2024 to 3/30/2024, patient was admitted and treated for possible osteomyelitis distal phalanx right great toe as well as cellulitis of the right foot.  Patient is also complaining of pain involving the right foot which is constant, sharp, worse with movement, slight relief at rest and 6/10 in severity.  Patient was seen by podiatrist yesterday, received antibiotic shot and was advised to go to the emergency room to be admitted for intravenous antibiotics.  X-ray of the right foot done on arrival at the emergency room showed first toe necrotizing soft tissue infection and acute osteomyelitis with pathologic fracturing of the phalanges.        Interval history / Subjective:   No new complaints, pain controlled     Assessment & Plan:     1.  Osteomyelitis of great toe of right foot POA:   -s/p amputation and I&D of great R toe  -continue IV abx  -wound care  -pathology and culture (operative) pending  -ID consulted     Paroxysmal atrial fibrillation POA: continue amiodarone     Hyperglycemia POA: A1c 5.9%    Essential hypertension POA: monitor on current regimen     Dyslipidemia POA: 
        [facility]                                                                                          Hospitalist Progress Note  Nicol Vazquez MD  Office Phone: [phone]        Date of Service:  2024  NAME:  Ranulfo Hernandez  :  1955  MRN:  [id number]       Admission Summary:   69 y.o. male with past medical history significant for prostate cancer, paroxysmal atrial fibrillation on Eliquis for anticoagulation, COPD, hypertension, dyslipidemia, hyperthyroidism, anxiety disorder presented at the emergency room with right big toe infection.  Patient stated that the infection was as a result of a pedicure that he had done a couple of weeks ago.  He also reported  fever without rigors or chills.  Patient has been admitted for foot infection in the past.  He was admitted from 3/25/2024 to 3/30/2024, patient was admitted and treated for possible osteomyelitis distal phalanx right great toe as well as cellulitis of the right foot.  Patient is also complaining of pain involving the right foot which is constant, sharp, worse with movement, slight relief at rest and 6/10 in severity.  Patient was seen by podiatrist yesterday, received antibiotic shot and was advised to go to the emergency room to be admitted for intravenous antibiotics.  X-ray of the right foot done on arrival at the emergency room showed first toe necrotizing soft tissue infection and acute osteomyelitis with pathologic fracturing of the phalanges.        Interval history / Subjective:   No new complaints, pain controlled     Assessment & Plan:     1.  Osteomyelitis of great toe of right foot POA:   -s/p amputation and I&D of great R toe  -continue IV abx  -wound care  -pathology and culture (operative) pending  -ID consulted     Paroxysmal atrial fibrillation POA: We will hold Eliquis in anticipation of intervention by the podiatry service.  This will need to be continued after intervention or 
        [facility]                                                                                          Hospitalist Progress Note  Nicol Vazquez MD  Office Phone: [phone]        Date of Service:  2024  NAME:  Ranulfo Hernandez  :  1955  MRN:  [id number]       Admission Summary:   69 y.o. male with past medical history significant for prostate cancer, paroxysmal atrial fibrillation on Eliquis for anticoagulation, COPD, hypertension, dyslipidemia, hyperthyroidism, anxiety disorder presented at the emergency room with right big toe infection.  Patient stated that the infection was as a result of a pedicure that he had done a couple of weeks ago.  He also reported  fever without rigors or chills.  Patient has been admitted for foot infection in the past.  He was admitted from 3/25/2024 to 3/30/2024, patient was admitted and treated for possible osteomyelitis distal phalanx right great toe as well as cellulitis of the right foot.  Patient is also complaining of pain involving the right foot which is constant, sharp, worse with movement, slight relief at rest and 6/10 in severity.  Patient was seen by podiatrist yesterday, received antibiotic shot and was advised to go to the emergency room to be admitted for intravenous antibiotics.  X-ray of the right foot done on arrival at the emergency room showed first toe necrotizing soft tissue infection and acute osteomyelitis with pathologic fracturing of the phalanges.        Interval history / Subjective:   S/p I&D and amputation of R great toe today, no new complaints     Assessment & Plan:     1.  Osteomyelitis of great toe of right foot POA:   -s/p amputation and I&D of great R toe  -podiatry consultation  -continue IV abx  -wound care     Paroxysmal atrial fibrillation POA: We will hold Eliquis in anticipation of intervention by the podiatry service.  This will need to be continued after intervention or if 
Day #2 of Vancomycin  Indication:  R foot hallux osteomyelitis/severe edema; 2nd digit ulceration 2/2 Hallux edema, hemosiderin discoloration dorsal distal midfoot right.   - MRI w/contrast-pending  Current regimen:  1250 mg IV every 12 hours   Abx regimen:  Vancomycin and Zosyn  ID Following ?: NO  Concomitant nephrotoxic drugs (requires more frequent monitoring): None  Frequency of BMP?: Daily     Recent Labs     24  1145 24  0649   WBC 9.3 6.8   CREATININE 0.76 0.59*   BUN 12 7   Est CrCl: 96 ml/min; UO: 1 ml/kg/hr (once occurrence)  Temp (24hrs), Av.3 °F (36.8 °C), Min:98.1 °F (36.7 °C), Max:98.6 °F (37 °C)    Cultures:    Blood 1 - NGTD - pending   Blood 2 - NGTD - pending    MRSA Swab ordered (if applicable)? N/A    Goal target range AUC/NEEMA 400-600    Recent level history:  Date/Time Dose & Interval Measured Level (mcg/mL) Associated AUC/NEEMA Dose Adjustment     (empiric adjustment) 1250 mg IV Q 12hrs  425 predicted 1500 mg IV Q 12hrs             Assessment/Plan: Leuks down this AM; afebrile. SCr improved with adequate UOP. Now AUC predicted to be in the lower end of therapeutic range. Given the presence of osteomyelitis will empirically adjust regimen to provide a more optimal AUC. Pharmacy will continue to monitor this patient daily for changes in clinical status and renal function.      
Day #4 of Vancomycin  Indication:  R foot hallux osteomyelitis/severe edema; 2nd digit ulceration 2/2 Hallux edema, hemosiderin discoloration dorsal distal midfoot right.   - MRI w/contrast-pending  Current regimen:  1500 mg IV every 12 hours   Abx regimen:  Vancomycin and Zosyn  ID Following ?: NO  Concomitant nephrotoxic drugs (requires more frequent monitoring): None  Frequency of BMP?: Daily through     Recent Labs     24  0649 24  0518 24  0623   WBC 6.8 10.1 9.0   CREATININE 0.59* 0.75 0.86   BUN 7 7 11   Est CrCl: 98 ml/min; UO: >1 ml/kg/hr  Temp (24hrs), Av °F (36.7 °C), Min:97.4 °F (36.3 °C), Max:98.8 °F (37.1 °C)    Cultures:    Blood #1 - NGTD   Blood #2 - NGTD   Wound - scant GNRs, prelim    MRSA Swab ordered (if applicable)? N/A    Goal target range AUC/NEEMA 400-600    Recent level history:  Date/Time Dose & Interval Measured Level (mcg/mL) Associated AUC/NEEMA Dose Adjustment     (empiric adjustment) 1250 mg IV Q 12hrs  362 predicted at this time, 425 at SS 1500 mg q12h    @ 1200 1500 mg q12h 15.8 (~9.25 hr post dose) 580 Continue, no change                                  Assessment/Plan: Change to 1250 mg IV every 12 hours for anticipated AUC of 550. Random level ordered for tomorrow AM.  
Discharge  instructions reviewed with patient. Opportunity for questions and clarification provided. Hard script given to patient for PRN pain medication  
Infectious Disease Progress     Impression:   OM of right great toe  S/p amputation of right great toe (9/28)  - afebrile, wbc 9.0     Intra-op cx (9/28) GNR     Previous wound cx grew GBS     Blood cx (9/26) no growth    Type II DM  - A1C 5.9    Continue with glucose control  Plan:     - vancomycin has been d/c'd evening of 9/29    Continue with IV zosyn; spoke with microbiology team. Identification and sensitivity will be available tomorrow.     Pathology is pending      ID team will follow    Plan of care d/w pt and Dr. Mac.            History of Present Illness   9/29/2024  \"70 y/o male with EtOH abuse, active smoker, COPD, prostate CA, A-fib, HTN, HLD recent admission @ [facility] 3/2024 for OM right toe admitted for persistent right 1st toe ulcer with infection.  Started initially after having a pedicure earlier this year with persistent wound.  Hospitalized 3/25-3/20/24 @ [facility] for right 1st toe wound, found to have osteomyelitis on MRI with sinus tract formation.  Underwent I/D, bone biopsy with + acute osteomyelitis.  ID was not involved in patient's care at that time and discharged with PO Augmentin 875mg-125mg BID to complete a 10 day course for which he attested to adherence to therapy.     Admitted for persistent wound with cellulitic changes, subjective fever, destructive OM changes on right 1st toe.  Underwent amputation of right 1st toe on 9/28 with firm and solid metatarsal head to right 1st MTP region.  + blood flow noted to tissue.     On Vanco/Zosyn post-operatively with culture so far pending, GNR and skin elizabeth so far. \"     Subjective:      Denies any discomfort    Review of Systems:            Symptom Y/N Comments   Symptom Y/N Comments   Fever/Chills       Chest Pain        Poor Appetite       Edema        Cough       Abdominal Pain        Sputum       Joint Pain        SOB/OSHEA       Pruritis/Rash        Nausea/vomit       Tolerating PT/OT        Diarrhea       Tolerating Diet        Constipation 
Infectious Disease Progress     Impression:   OM of right great toe  S/p amputation of right great toe (9/28)  - afebrile, wbc 9.0     Intra-op cx (9/28) morganella morganii, myroides     Previous wound cx grew GBS     Blood cx (9/26) no growth    Type II DM  - A1C 5.9    Continue with glucose control  Plan:     - Received IV zosyn. Recommend to complete total 2 weeks of PO levofloxacin, QTC 380ms.    Patient was counseled on potential side effects of antibiotic therapy including but not limited to: GI upset, rash, diarrhea, allergic reaction, myositis, tendonitis, neuromuscular side effects, nephrotoxicity, ototoxicity, hepatotoxicity.       Pathology result reviewed by Dr. Antoine, podiatrist.     -> Patient pathology shows digital osteomyelitis, proximal margin shows more just inflammation more likely arthritis and regular cartilage degradation but no proximal osteomyelitis.      Recommend pt to follow up with podiatrist upon discharge.     ID team signing off. Please contact us with any questions.   Plan of care d/w pt, Dr. Vazquez, and Dr. Mac.            History of Present Illness   9/29/2024  \"70 y/o male with EtOH abuse, active smoker, COPD, prostate CA, A-fib, HTN, HLD recent admission @ [facility] 3/2024 for OM right toe admitted for persistent right 1st toe ulcer with infection.  Started initially after having a pedicure earlier this year with persistent wound.  Hospitalized 3/25-3/20/24 @ [facility] for right 1st toe wound, found to have osteomyelitis on MRI with sinus tract formation.  Underwent I/D, bone biopsy with + acute osteomyelitis.  ID was not involved in patient's care at that time and discharged with PO Augmentin 875mg-125mg BID to complete a 10 day course for which he attested to adherence to therapy.     Admitted for persistent wound with cellulitic changes, subjective fever, destructive OM changes on right 1st toe.  Underwent amputation of right 1st toe on 9/28 with firm and solid metatarsal head 
Patient is in vascular right now for his Doppler/ABIs.        I put in a new wound care nursing consult to reassess now that he is postop great toe amputation right foot.  
Patient is receiving excellent wound care nursing at [place] has excellent bandaging today. Intravenous antibiotics are continued. Patient stabilized systemic involvement, localized to the great toe right foot.    I spoke with patient directly explained the procedure for tomorrow. Risks and benefits as well as potential complications discussed, length of time required for healing which sometimes x2 months for complete healing of skin discussed, he understands this takes time to heal and not to be excessively on it after surgery after discharge.        Depending upon the condition of the integument, Patient may require a second Irrigation several days later depending upon his response tomorrow and the condition of the integument proximally. This is discussed with him-In some cases these tissues need a second surgery really due to the severity of the necrosis initially.  
Patient may weightbear right foot heel walking with use of surgical shoe.  
Patient pathology shows digital osteomyelitis, proximal margin shows more just inflammation more likely arthritis and regular cartilage degradation but no proximal osteomyelitis.    Pathology final, microbiology completed susceptibilities.    Nursing has completed second evaluation with excellent bandaging.    Patient being set up for discharge planning.    Will be in just past noon to assess wound directly for discharge and bandaging discharge instructions.  
Patient seen today, white blood cell count down. Vitals are stable.    Has completed the Doppler I will assess those results for arterial flow to the lower extremity.    Bandage change today, wound site looks excellent with good distal perfusion, second digit improved also. Cleanse the area thoroughly and iodine applied to the incision with ABD padding and 4 x 4 external , kerlix and Coban right foot.    Following pain medication regimen, following by the antibiotic care. Again, patient may ambulate heel walking and surgical shoe, but mainly bedrest today. Awaiting final cultures and pathology, first metatarsal looked excellent with no invasion clinically seen intraoperatively and MRI was clear also Patient seen today, with blood cell count down. Vitals are stable.    Has completed the Doppler I will assess those results for arterial flow to the lower extremity.    Bandage change today, wound site looks excellent with good distal perfusion, second digit improved also. Cleanse the area thoroughly and iodine applied to the incision with ABD padding and 4 x 4 external acrylics and Koban right foot.    Following pain medication Diane, following by the antibiotic care. Again, patient may ambulate heel walking and surgical shoe, but mainly bedrest today. Awaiting final cultures and pathology, first metatarsal looked excellent with no invasion clinically seen and MRI was clear also.at   
Pharmacist Note - Vancomycin Dosing    Consult provided for this 69 y.o. male for indication of bone/joint infection : osteomyelitis of great toe of right foot.    Antibiotic regimen(s): Vanc + Zosyn  Patient on vancomycin PTA? NO     Recent Labs     24  1145   WBC 9.3   CREATININE 0.76   BUN 12     Frequency of BMP: CMP x tomorrow, followed by BMP daily x 3 days   Height: 193 cm  Weight: 96 kg  Est CrCl: >100 ml/min; UO: --- ml/kg/hr  Temp (24hrs), Av.4 °F (36.9 °C), Min:98.2 °F (36.8 °C), Max:98.6 °F (37 °C)    Cultures:   Blood 1 - ngtd   Blood 2 - ngtd    MRSA Swab ordered (if applicable)? N/A    The plan below is expected to result in a target range of AUC/NEEMA 400-600    Therapy will be initiated with a loading dose of 2500 mg IV x 1 to be followed by a maintenance dose of 1250 mg IV every 12 hours.  Pharmacy to follow patient daily and order levels / make dose adjustments as appropriate.    *Vancomycin has been dosed used Bayesian kinetics software to target an AUC/NEEMA of 400-600, which provides adequate exposure for an assumed infection due to MRSA with an NEEMA of 1 or less while reducing the risk of nephrotoxicity as seen with traditional trough based dosing goals.     
Pharmacist Note - Vancomycin Dosing  Therapy day 3  Indication:  R foot hallux osteomyelitis/severe edema; 2nd digit ulceration 2/2 Hallux edema, hemosiderin discoloration dorsal distal midfoot right.   - MRI confirms osteo  Current regimen: 1500 mg IV every 12 hours    Recent Labs     09/26/24  1145 09/27/24  0649 09/28/24  0518   WBC 9.3 6.8 10.1   CREATININE 0.76 0.59* 0.75   BUN 12 7 7       A random vancomycin level of 15.8 mcg/mL was obtained and from this level, the patient's AUC24 is calculated to be 580 with the current regimen.     Goal target range AUC/NEEMA 400-600      Plan: Continue current regimen given treating osteomyelitis. Pharmacy will continue to monitor this patient daily for changes in clinical status and renal function.    *Random vancomycin levels are used to calculate AUC/NEEMA, this level should not be interpreted as a trough. Vancomycin has been dosed using Bayesian kinetics software to target an AUC24:NEEMA of 400-600, which provides adequate exposure for as assumed infection due to MRSA with an NEEMA of 1 or less while reducing the risk of nephrotoxicity as seen with traditional trough based dosing goals.  
QUINN within normal limits bilateral foot.  
STAT MRI ordered right foot  
The patient is seen today, right foot looks significantly improved. No dehiscence at amputation site some serosanguineous drainage but no purulence. Second & third digit improving.    Wound care nursing excellent approach with the Bandaging, sterile Re-bandage carried out today this is good for at least 48 hours right foot.    Findings discussed with the patient, need for outpatient home health wound care nursing discussed, he is agreeable.      Xeroform working for the digits, however I do now want to decrease the moisture at the distal wound site amputation site, recommending silver alginate to distal amputation site, with sterile Kerlix and Ace wrap, continue with q.48 hours Xeroform to the digits.    Will follow outpatient in clinic, closely.  
    Financial abuse: Denies     Sexual abuse: Denies   Utilities: Not At Risk (3/25/2024)    [facility] Utilities     Threatened with loss of utilities: No       Review of Systems:   A comprehensive review of systems was negative.       Vital Signs:    Last 24hrs VS reviewed since prior progress note. Most recent are:  Vitals:    09/27/24 1518   BP:    Pulse:    Resp: 16   Temp:    SpO2:          Intake/Output Summary (Last 24 hours) at 9/27/2024 1528  Last data filed at 9/27/2024 0830  Gross per 24 hour   Intake 50 ml   Output 1600 ml   Net -1550 ml        Physical Examination:     I had a face to face encounter with this patient and independently examined them on 9/27/2024 as outlined below:          General : alert x 3, awake, no acute distress,   HEENT: PEERL, EOMI, moist mucus membrane  Neck: supple, no JVD, no meningeal signs  Chest: Clear to auscultation bilaterally   CVS: S1 S2 heard, Capillary refill less than 2 seconds  Abd: soft/ non tender, non distended, BS physiological,   Ext: R foot wrapped, see wound care notes  Neuro/Psych: pleasant mood and affect, CN 2-12 grossly intact, sensory grossly within normal limit, Strength 5/5 in all extremities  Skin: warm     Data Review:    Review and/or order of clinical lab test  Review and/or order of tests in the radiology section of CPT  Review and/or order of tests in the medicine section of CPT      I have personally and independently reviewed all pertinent labs, diagnostic studies, imaging, and have provided independent interpretation of the same.     Labs:     Recent Labs     09/26/24  1145 09/27/24  0649   WBC 9.3 6.8   HGB 14.6 13.1   HCT 43.8 40.5    288     Recent Labs     09/26/24  1145 09/27/24  0649    139   K 3.6 3.5    104   CO2 29 31   BUN 12 7   MG  --  2.4   PHOS  --  3.3     Recent Labs     09/26/24  1145 09/27/24  0649   ALT 35 75   GLOB 5.3* 4.0     No results for input(s): \"INR\", \"APTT\" in the last 72 hours.    Invalid input(s): 
    09/26/24  1145 09/27/24  0649   ALT 35 75   GLOB 5.3* 4.0     No results for input(s): \"INR\", \"APTT\" in the last 72 hours.    Invalid input(s): \"PTP\"   No results for input(s): \"TIBC\" in the last 72 hours.    Invalid input(s): \"FE\", \"PSAT\", \"FERR\"   No results found for: \"RBCF\"   No results for input(s): \"PH\", \"PCO2\", \"PO2\" in the last 72 hours.  No results for input(s): \"CPK\" in the last 72 hours.    Invalid input(s): \"CPKMB\", \"CKNDX\", \"TROIQ\"  No results found for: \"CHOL\", \"CHLST\", \"CHOLV\", \"HDL\", \"HDLC\", \"LDL\", \"LDLC\"  No results found for: \"GLUCPOC\"  [unfilled]    Notes reviewed from all clinical/nonclinical/nursing services involved in patient's clinical care. Care coordination discussions were held with appropriate clinical/nonclinical/ nursing providers based on care coordination needs.         Patients current active Medications were reviewed, considered, added and adjusted based on the clinical condition today.      Home Medications were reconciled to the best of my ability given all available resources at the time of admission. Route is PO if not otherwise noted.      Admission Status:[id number]:::1}      Medications Reviewed:     Current Facility-Administered Medications   Medication Dose Route Frequency    vancomycin (VANCOCIN) 1,500 mg in sodium chloride 0.9 % 250 mL IVPB ([id number])  1,500 mg IntraVENous Q12H    morphine (PF) injection 2 mg  2 mg IntraVENous Q4H PRN    oxyCODONE (ROXICODONE) immediate release tablet 5 mg  5 mg Oral Q4H PRN    amLODIPine (NORVASC) tablet 10 mg  10 mg Oral Daily    ascorbic acid (VITAMIN C) tablet 1,000 mg  1,000 mg Oral Daily    aspirin tablet 325 mg  325 mg Oral Daily    dextromethorphan-guaiFENesin (MUCINEX DM)  MG per extended release tablet 1 tablet  1 tablet Oral Q12H PRN    folic acid (FOLVITE) tablet 800 mcg  800 mcg Oral Daily    hydroCHLOROthiazide (HYDRODIURIL) tablet 12.5 mg  12.5 mg Oral Daily    rosuvastatin (CRESTOR) tablet 20 mg  20 mg Oral Nightly

## 2024-10-02 NOTE — CARE COORDINATION
Transition of Care Plan:    Noted: During March admission at [facility], Pt was unable to find accepting HH agency. Pt was taught to do wound care on his foot.     Patient rents a room from his employer [employer]. Patients mailing address is listed on the demographic sheet.     HH order has been placed, patient does not need long term IV ABX, FOC offered [facility] has accepted, CM added to AVS.       4:40PM: CM notified this patient needs a RW, order placed with [employer]- to be delivered to patient prior to discharge.     --  RUR: 8%  Prior Level of Functioning: independent  Disposition: Home  If SNF or IPR: Date FOC offered:   Date FOC received:   Accepting facility:   Date authorization started with reference number:   Date authorization received and expires:   Follow up appointments: per physician recommendation   DME needed: TBD  Transportation at discharge: family  IM/IMM Medicare/[employer] letter given: 1st 9/26  Caregiver Contact: Ayana Phillips [phone]     Laverne Shepherd RN/CRM

## 2024-10-07 LAB
BACTERIA SPEC CULT: NORMAL
SERVICE CMNT-IMP: NORMAL

## 2024-10-08 ENCOUNTER — TELEPHONE (OUTPATIENT)
Facility: CLINIC | Age: 69
End: 2024-10-08

## 2024-10-08 NOTE — TELEPHONE ENCOUNTER
Patient states he was Dr Stevens's patient a long time a go and would like to become his patient again. Please call.     [phone]

## 2024-10-08 NOTE — TELEPHONE ENCOUNTER
Patient advised Dr. Stevens is not taking on new patients.   Patient was advised that [facility] has two new physicians.  Patient states he will call back tomorrow to schedule an appt.

## 2024-10-14 LAB
BACTERIA SPEC CULT: NORMAL
SERVICE CMNT-IMP: NORMAL

## 2024-10-21 LAB
BACTERIA SPEC CULT: NORMAL
SERVICE CMNT-IMP: NORMAL

## 2024-10-28 LAB
BACTERIA SPEC CULT: NORMAL
SERVICE CMNT-IMP: NORMAL

## 2024-12-02 ENCOUNTER — HOSPITAL ENCOUNTER (OUTPATIENT)
Facility: HOSPITAL | Age: 69
Discharge: HOME | End: 2024-12-02
Payer: MEDICARE

## 2024-12-02 VITALS
HEART RATE: 93 BPM | RESPIRATION RATE: 18 BRPM | SYSTOLIC BLOOD PRESSURE: 110 MMHG | TEMPERATURE: 97.6 F | DIASTOLIC BLOOD PRESSURE: 79 MMHG

## 2024-12-02 DIAGNOSIS — L76.82 POSTOPERATIVE COMPLICATION OF SKIN INVOLVING DRAINAGE FROM SURGICAL WOUND: Primary | ICD-10-CM

## 2024-12-02 DIAGNOSIS — M86.9 OSTEOMYELITIS OF GREAT TOE OF RIGHT FOOT: ICD-10-CM

## 2024-12-02 PROCEDURE — 99213 OFFICE O/P EST LOW 20 MIN: CPT

## 2024-12-02 PROCEDURE — 97597 DBRDMT OPN WND 1ST 20 CM/<: CPT

## 2024-12-02 RX ORDER — GENTAMICIN SULFATE 1 MG/G
OINTMENT TOPICAL ONCE
Status: CANCELLED | OUTPATIENT
Start: 2024-12-02 | End: 2024-12-02

## 2024-12-02 RX ORDER — LIDOCAINE HYDROCHLORIDE 20 MG/ML
JELLY TOPICAL ONCE
Status: CANCELLED | OUTPATIENT
Start: 2024-12-02 | End: 2024-12-02

## 2024-12-02 RX ORDER — SILVER SULFADIAZINE 10 MG/G
CREAM TOPICAL ONCE
Status: CANCELLED | OUTPATIENT
Start: 2024-12-02 | End: 2024-12-02

## 2024-12-02 RX ORDER — GINSENG 100 MG
CAPSULE ORAL ONCE
Status: CANCELLED | OUTPATIENT
Start: 2024-12-02 | End: 2024-12-02

## 2024-12-02 RX ORDER — LIDOCAINE 40 MG/G
CREAM TOPICAL ONCE
Status: CANCELLED | OUTPATIENT
Start: 2024-12-02 | End: 2024-12-02

## 2024-12-02 RX ORDER — BETAMETHASONE DIPROPIONATE 0.5 MG/G
CREAM TOPICAL ONCE
Status: CANCELLED | OUTPATIENT
Start: 2024-12-02 | End: 2024-12-02

## 2024-12-02 RX ORDER — CLOBETASOL PROPIONATE 0.5 MG/G
OINTMENT TOPICAL ONCE
Status: CANCELLED | OUTPATIENT
Start: 2024-12-02 | End: 2024-12-02

## 2024-12-02 RX ORDER — SODIUM CHLOR/HYPOCHLOROUS ACID 0.033 %
SOLUTION, IRRIGATION IRRIGATION ONCE
Status: CANCELLED | OUTPATIENT
Start: 2024-12-02 | End: 2024-12-02

## 2024-12-02 RX ORDER — NEOMYCIN/BACITRACIN/POLYMYXINB 3.5-400-5K
OINTMENT (GRAM) TOPICAL ONCE
Status: CANCELLED | OUTPATIENT
Start: 2024-12-02 | End: 2024-12-02

## 2024-12-02 RX ORDER — LIDOCAINE HYDROCHLORIDE 40 MG/ML
SOLUTION TOPICAL ONCE
Status: CANCELLED | OUTPATIENT
Start: 2024-12-02 | End: 2024-12-02

## 2024-12-02 RX ORDER — BACITRACIN ZINC AND POLYMYXIN B SULFATE 500; 1000 [USP'U]/G; [USP'U]/G
OINTMENT TOPICAL ONCE
Status: CANCELLED | OUTPATIENT
Start: 2024-12-02 | End: 2024-12-02

## 2024-12-02 RX ORDER — MUPIROCIN 20 MG/G
OINTMENT TOPICAL ONCE
Status: CANCELLED | OUTPATIENT
Start: 2024-12-02 | End: 2024-12-02

## 2024-12-02 RX ORDER — TRIAMCINOLONE ACETONIDE 1 MG/G
OINTMENT TOPICAL ONCE
Status: CANCELLED | OUTPATIENT
Start: 2024-12-02 | End: 2024-12-02

## 2024-12-02 RX ORDER — LIDOCAINE 50 MG/G
OINTMENT TOPICAL ONCE
Status: CANCELLED | OUTPATIENT
Start: 2024-12-02 | End: 2024-12-02

## 2024-12-02 NOTE — H&P
MD     No Known Allergies     Review of Systems:  A comprehensive review of systems was negative except for that written in the History of Present Illness.and PMH.    Objective:     Physical Exam:     /79   Pulse 93   Temp 97.6 °F (36.4 °C) (Temporal)   Resp 18   General: well developed, well nourished, pleasant , NAD. Hygiene good  Psych: cooperative. Pleasant. No anxiety or depression. Normal mood and affect.  Neuro: alert and oriented to person/place/situation. Otherwise nonfocal.  HEENT: Normocephalic, atraumatic. EOMI. Conjunctiva clear. No scleral icterus.   Chest: Respirations nonlabored.  Abdomen:  Nondistended.    Lower extremities: color normal; temperature normal. Hair growth is not present. Calves are supple, nontender, approximately equally sized in comparison.     Focused Lower Extremity Exam:  Bounding pulses.    Ulcer Location: Rt. foot hallux   Etiology: Postsurgical  Wound:  1.9 x 0.9 x 0.1 cm  Ulcer bed: Fibrotic slough    Periwound: Normal  Exudate: Scant/small amount Serous exudate  Depth of Wound: To Fat Layer       Assessment:     69 y.o. male with Rt. foot hallux neuropathic ulcer.  Smoker    Plan:   Wound was at least mechanically debrided using a 4x4. More extensive debridement, if done, is outlined below.  Encouraged immediate smoking cessation.  Slough removed with ring curette - no pain, limited bleeding, controlled with pressure.    Dressing: Silver alginate, gauze, RG.   Frequency: three times a week    Patient understood and agrees with plan. Questions answered.    Weekly visits and serial debridements also discussed.  Follow up with me in 2 weeks.    Signed By: Andrea Carranza MD     December 2, 2024

## 2024-12-02 NOTE — FLOWSHEET NOTE
12/02/24 1415   RLE Neurovascular Assessment   Capillary Refill Less than/Equal to 3 seconds   Color Appropriate for Ethnicity   Temperature Warm   R Pedal Pulse +3   Wound 12/02/24 Toe (Comment  which one) Right great toe   Date First Assessed/Time First Assessed: 12/02/24 1432   Wound Approximate Age at First Assessment (Weeks): 8 weeks  Primary Wound Type: Surgical Type  Location: Toe (Comment  which one)  Wound Location Orientation: Right  Wound Description (Comments)...   Wound Image    Wound Etiology Non-Healing Surgical   Dressing Status Old drainage noted   Wound Cleansed Cleansed with saline   Wound Length (cm) 1.9 cm   Wound Width (cm) 0.9 cm   Wound Depth (cm) 0.1 cm   Wound Surface Area (cm^2) 1.71 cm^2   Wound Volume (cm^3) 0.171 cm^3   Wound Assessment [place]/red;Slough   Drainage Amount Moderate (25-50%)   Drainage Description Serous   Odor None   [place]-wound Assessment Fragile   Margins Flat/open edges   Wound Thickness Description not for Pressure Injury Full thickness   Pain Assessment   Pain Assessment None - Denies Pain     /79   Pulse 93   Temp 97.6 °F (36.4 °C) (Temporal)   Resp 18

## 2024-12-03 RX ORDER — BETAMETHASONE DIPROPIONATE 0.5 MG/G
CREAM TOPICAL ONCE
OUTPATIENT
Start: 2024-12-03 | End: 2024-12-03

## 2024-12-03 RX ORDER — LIDOCAINE 40 MG/G
CREAM TOPICAL ONCE
OUTPATIENT
Start: 2024-12-03 | End: 2024-12-03

## 2024-12-03 RX ORDER — SODIUM CHLOR/HYPOCHLOROUS ACID 0.033 %
SOLUTION, IRRIGATION IRRIGATION ONCE
OUTPATIENT
Start: 2024-12-03 | End: 2024-12-03

## 2024-12-03 RX ORDER — BACITRACIN ZINC AND POLYMYXIN B SULFATE 500; 1000 [USP'U]/G; [USP'U]/G
OINTMENT TOPICAL ONCE
OUTPATIENT
Start: 2024-12-03 | End: 2024-12-03

## 2024-12-03 RX ORDER — LIDOCAINE HYDROCHLORIDE 20 MG/ML
JELLY TOPICAL ONCE
OUTPATIENT
Start: 2024-12-03 | End: 2024-12-03

## 2024-12-03 RX ORDER — TRIAMCINOLONE ACETONIDE 1 MG/G
OINTMENT TOPICAL ONCE
OUTPATIENT
Start: 2024-12-03 | End: 2024-12-03

## 2024-12-03 RX ORDER — LIDOCAINE HYDROCHLORIDE 40 MG/ML
SOLUTION TOPICAL ONCE
OUTPATIENT
Start: 2024-12-03 | End: 2024-12-03

## 2024-12-03 RX ORDER — CLOBETASOL PROPIONATE 0.5 MG/G
OINTMENT TOPICAL ONCE
OUTPATIENT
Start: 2024-12-03 | End: 2024-12-03

## 2024-12-03 RX ORDER — GINSENG 100 MG
CAPSULE ORAL ONCE
OUTPATIENT
Start: 2024-12-03 | End: 2024-12-03

## 2024-12-03 RX ORDER — GENTAMICIN SULFATE 1 MG/G
OINTMENT TOPICAL ONCE
OUTPATIENT
Start: 2024-12-03 | End: 2024-12-03

## 2024-12-03 RX ORDER — SILVER SULFADIAZINE 10 MG/G
CREAM TOPICAL ONCE
OUTPATIENT
Start: 2024-12-03 | End: 2024-12-03

## 2024-12-03 RX ORDER — LIDOCAINE 50 MG/G
OINTMENT TOPICAL ONCE
OUTPATIENT
Start: 2024-12-03 | End: 2024-12-03

## 2024-12-03 RX ORDER — MUPIROCIN 20 MG/G
OINTMENT TOPICAL ONCE
OUTPATIENT
Start: 2024-12-03 | End: 2024-12-03

## 2024-12-03 RX ORDER — NEOMYCIN/BACITRACIN/POLYMYXINB 3.5-400-5K
OINTMENT (GRAM) TOPICAL ONCE
OUTPATIENT
Start: 2024-12-03 | End: 2024-12-03

## 2025-01-06 ENCOUNTER — TELEPHONE (OUTPATIENT)
Facility: CLINIC | Age: 70
End: 2025-01-06

## 2025-01-06 NOTE — TELEPHONE ENCOUNTER
----- Message from Trish S sent at 1/3/2025  4:54 PM EST -----  Regarding: ECC Appointment Request  ECC Appointment Request    Patient needs appointment for ECC Appointment Type: New to Provider.    Patient Requested Dates(s):any date in january  Patient Requested Time: after 3;30  Provider Name:n\a    Reason for Appointment Request: New Patient - No appointments available during search  --------------------------------------------------------------------------------------------------------------------------    Relationship to Patient: Self     Call Back Information: OK to leave message on voicemail  Preferred Call Back Number: Phone [phone] (home)

## 2025-01-20 ENCOUNTER — HOSPITAL ENCOUNTER (OUTPATIENT)
Facility: HOSPITAL | Age: 70
Discharge: HOME | End: 2025-01-20
Attending: ORTHOPAEDIC SURGERY
Payer: MEDICARE

## 2025-01-20 VITALS
HEART RATE: 91 BPM | DIASTOLIC BLOOD PRESSURE: 72 MMHG | RESPIRATION RATE: 18 BRPM | TEMPERATURE: 98 F | SYSTOLIC BLOOD PRESSURE: 141 MMHG

## 2025-01-20 DIAGNOSIS — M86.9 OSTEOMYELITIS OF GREAT TOE OF RIGHT FOOT: Primary | ICD-10-CM

## 2025-01-20 DIAGNOSIS — L76.82 POSTOPERATIVE COMPLICATION OF SKIN INVOLVING DRAINAGE FROM SURGICAL WOUND: ICD-10-CM

## 2025-01-20 PROCEDURE — 99212 OFFICE O/P EST SF 10 MIN: CPT

## 2025-01-20 RX ORDER — LIDOCAINE 40 MG/G
CREAM TOPICAL ONCE
Status: CANCELLED | OUTPATIENT
Start: 2025-01-20 | End: 2025-01-20

## 2025-01-20 RX ORDER — LIDOCAINE 50 MG/G
OINTMENT TOPICAL ONCE
Status: CANCELLED | OUTPATIENT
Start: 2025-01-20 | End: 2025-01-20

## 2025-01-20 RX ORDER — LIDOCAINE HYDROCHLORIDE 40 MG/ML
SOLUTION TOPICAL ONCE
Status: CANCELLED | OUTPATIENT
Start: 2025-01-20 | End: 2025-01-20

## 2025-01-20 RX ORDER — TRIAMCINOLONE ACETONIDE 1 MG/G
OINTMENT TOPICAL ONCE
Status: CANCELLED | OUTPATIENT
Start: 2025-01-20 | End: 2025-01-20

## 2025-01-20 RX ORDER — GENTAMICIN SULFATE 1 MG/G
OINTMENT TOPICAL ONCE
Status: CANCELLED | OUTPATIENT
Start: 2025-01-20 | End: 2025-01-20

## 2025-01-20 RX ORDER — MUPIROCIN 20 MG/G
OINTMENT TOPICAL ONCE
Status: CANCELLED | OUTPATIENT
Start: 2025-01-20 | End: 2025-01-20

## 2025-01-20 RX ORDER — SILVER SULFADIAZINE 10 MG/G
CREAM TOPICAL ONCE
Status: CANCELLED | OUTPATIENT
Start: 2025-01-20 | End: 2025-01-20

## 2025-01-20 RX ORDER — NEOMYCIN/BACITRACIN/POLYMYXINB 3.5-400-5K
OINTMENT (GRAM) TOPICAL ONCE
Status: CANCELLED | OUTPATIENT
Start: 2025-01-20 | End: 2025-01-20

## 2025-01-20 RX ORDER — BETAMETHASONE DIPROPIONATE 0.5 MG/G
CREAM TOPICAL ONCE
Status: CANCELLED | OUTPATIENT
Start: 2025-01-20 | End: 2025-01-20

## 2025-01-20 RX ORDER — BACITRACIN ZINC AND POLYMYXIN B SULFATE 500; 1000 [USP'U]/G; [USP'U]/G
OINTMENT TOPICAL ONCE
Status: CANCELLED | OUTPATIENT
Start: 2025-01-20 | End: 2025-01-20

## 2025-01-20 RX ORDER — GINSENG 100 MG
CAPSULE ORAL ONCE
Status: CANCELLED | OUTPATIENT
Start: 2025-01-20 | End: 2025-01-20

## 2025-01-20 RX ORDER — CLOBETASOL PROPIONATE 0.5 MG/G
OINTMENT TOPICAL ONCE
Status: CANCELLED | OUTPATIENT
Start: 2025-01-20 | End: 2025-01-20

## 2025-01-20 RX ORDER — SODIUM CHLOR/HYPOCHLOROUS ACID 0.033 %
SOLUTION, IRRIGATION IRRIGATION ONCE
Status: CANCELLED | OUTPATIENT
Start: 2025-01-20 | End: 2025-01-20

## 2025-01-20 RX ORDER — LIDOCAINE HYDROCHLORIDE 20 MG/ML
JELLY TOPICAL ONCE
Status: CANCELLED | OUTPATIENT
Start: 2025-01-20 | End: 2025-01-20

## 2025-01-20 RX ORDER — LEVOMEFOLATE/B6/B12/ALGAL OIL 3-35-2 MG
CAPSULE ORAL
COMMUNITY
Start: 2025-01-09

## 2025-01-20 ASSESSMENT — PAIN DESCRIPTION - LOCATION: LOCATION: FOOT

## 2025-01-20 ASSESSMENT — PAIN DESCRIPTION - ORIENTATION: ORIENTATION: RIGHT;LEFT

## 2025-01-20 ASSESSMENT — PAIN DESCRIPTION - DESCRIPTORS: DESCRIPTORS: BURNING

## 2025-01-20 ASSESSMENT — PAIN SCALES - GENERAL: PAINLEVEL_OUTOF10: 10

## 2025-01-20 NOTE — DISCHARGE INSTRUCTIONS
Discharge Instructions/Wound Care Orders  [facility]  [address]   [address], [address]  [place] [address]   Telephone: [phone]    FAX [phone]      NAME:  Ranulfo Hernanedz  YOB: 1955  MEDICAL RECORD NUMBER:  [id number]  DATE:  1/20/2025    CPT code: E&M-Level 2 (14945)    Congratulations!  Your Wound Has Healed & You have completed your treatment.     Return to your Primary Care Physician for all your health issues.   Resume your ordinary activities as tolerated.   Take your medications as prescribed by your primary care physician.   Check your skin daily for cracks, bruises, sores, or dryness. Use a moisturizer as needed.   Clean and dry your skin, using mild soap and warm water (not hot).   Avoid alcohol and caffeine and do not smoke.  Maintain a nutritious diet.  Avoid pressure on your wound site. Keep your legs elevated above the level of the heart whenever possible.    THANK YOU FOR ALLOWING US TO SERVE YOU.  PLEASE CALL IF YOU DEVELOP ANOTHER WOUND.     Electronically signed by VIANEY YANG RN on 1/20/2025 at 3:56 PM     Wound Care Center Information: Should you experience any significant changes in your wound(s) or have questions about your wound care, please contact the [facility] at MONDAY - FRIDAY 8:00 am - 4:30.  If you need help with your wound outside these hours and cannot wait until we are again available, contact your PCP or go to the hospital emergency room.     PLEASE NOTE: IF YOU ARE UNABLE TO OBTAIN WOUND SUPPLIES, CONTINUE TO USE THE SUPPLIES YOU HAVE AVAILABLE UNTIL YOU ARE ABLE TO REACH US. IT IS MOST IMPORTANT TO KEEP THE WOUND COVERED AT ALL TIMES.     Physician Signature:_______________________    Date: ___________ Time:  ____________

## 2025-01-20 NOTE — FLOWSHEET NOTE
01/20/25 1530   RLE Neurovascular Assessment   Capillary Refill Less than/Equal to 3 seconds   Color Appropriate for Ethnicity   Temperature Warm   R Pedal Pulse +3   Wound 12/02/24 Toe (Comment  which one) Right great toe   Date First Assessed/Time First Assessed: 12/02/24 1432   Wound Approximate Age at First Assessment (Weeks): 8 weeks  Primary Wound Type: Surgical Type  Location: Toe (Comment  which one)  Wound Location Orientation: Right  Wound Description (Comments)...   Wound Image    Wound Etiology Non-Healing Surgical   Dressing Status Dry   Wound Cleansed Cleansed with saline   Wound Length (cm) 0.1 cm   Wound Width (cm) 0.1 cm   Wound Depth (cm) 0.1 cm   Wound Surface Area (cm^2) 0.01 cm^2   Change in Wound Size % (l*w) 99.42   Wound Volume (cm^3) 0.001 cm^3   Wound Healing % 99   Wound Assessment Epithelialization   Drainage Amount None (dry)   Odor None         BP (!) 141/72   Pulse 91   Temp 98 °F (36.7 °C) (Temporal)   Resp 18

## 2025-01-20 NOTE — PROGRESS NOTES
Wound Center  Progress Note     Date of Service: 1/20/25      Date of Initial Visit for Current Problem:  12/2/24     Subjective:      Chief Complaint:  Ranulfo Hernandez is a 69 y.o.  male who presents with Rt. foot hallux post-surgical amputation wound of 2 months duration.     Referred by:  Dr. Antoine     HPI:   Had amputation of great toe in September for osteo. Wound very slow to heal, referred here.  Wound caused by: non-healed surgical wound for right great toe amputation.  Current wound care:  Offloading wound: no  Appetite: good  Wound associated pain: none  Diabetic: No  Smoker: yes!!     Past Medical History        Past Medical History:   Diagnosis Date    Cancer (HCC)       PROSTATE    Diverticulitis      Hyperthyroidism 5/15/2019    Psychiatric disorder       ANXIETY (SITUATIONAL)         Past Surgical History         Past Surgical History:   Procedure Laterality Date    GI   2003     18 \" COLON RESECTION (DIVERTICULITIS)    [place] UNLISTED PROCEDURE ABDOMEN PERITONEUM & OMENTUM         UMBILICAL HERNIA    TOE AMPUTATION Right 3/27/2024     RIGHT FOOT GREAT TOE INCISION AND DRAINAGE WITH BONE RESECTION (MAC W/LOCAL) performed by Juan Pablo Antoine DPM at [facility] MAIN OR    TOE AMPUTATION Right 9/28/2024     INCISION AND DRAINAGE WITH AMPUTATION GREAT TOE RIGHT FOOT performed by Juan Pablo Antoine DPM at [facility] MAIN OR    UROLOGICAL SURGERY         CYSTO/BIOPSY PROSTATE         Family History         Family History   Problem Relation Age of Onset    Heart Disease Mother      Cancer Father           PROSTATE    Diabetes Maternal Grandmother      Anesth Problems Neg Hx      Cancer Mother           LUNG         Social History            Tobacco Use    Smoking status: Every Day       Current packs/day: 1.00       Types: Cigarettes    Smokeless tobacco: Never   Substance Use Topics    Alcohol use: Yes       Alcohol/week: 5.0 standard drinks of alcohol       Comment: Per pt, \"I'm an alcoholic\". Daily drinks 40oz

## 2025-02-07 ENCOUNTER — OFFICE VISIT (OUTPATIENT)
Facility: CLINIC | Age: 70
End: 2025-02-07
Payer: MEDICARE

## 2025-02-07 VITALS
HEIGHT: 76 IN | DIASTOLIC BLOOD PRESSURE: 80 MMHG | OXYGEN SATURATION: 95 % | SYSTOLIC BLOOD PRESSURE: 163 MMHG | RESPIRATION RATE: 16 BRPM | WEIGHT: 219.6 LBS | HEART RATE: 78 BPM | BODY MASS INDEX: 26.74 KG/M2

## 2025-02-07 DIAGNOSIS — Z91.81 AT HIGH RISK FOR FALLS: ICD-10-CM

## 2025-02-07 DIAGNOSIS — E66.3 OVERWEIGHT (BMI 25.0-29.9): ICD-10-CM

## 2025-02-07 DIAGNOSIS — I10 PRIMARY HYPERTENSION: Primary | ICD-10-CM

## 2025-02-07 DIAGNOSIS — E78.2 MIXED HYPERLIPIDEMIA: ICD-10-CM

## 2025-02-07 DIAGNOSIS — S98.111A AMPUTATION OF RIGHT GREAT TOE (HCC): ICD-10-CM

## 2025-02-07 DIAGNOSIS — L84 FOOT CALLUS: ICD-10-CM

## 2025-02-07 PROCEDURE — 3077F SYST BP >= 140 MM HG: CPT | Performed by: STUDENT IN AN ORGANIZED HEALTH CARE EDUCATION/TRAINING PROGRAM

## 2025-02-07 PROCEDURE — 1125F AMNT PAIN NOTED PAIN PRSNT: CPT | Performed by: STUDENT IN AN ORGANIZED HEALTH CARE EDUCATION/TRAINING PROGRAM

## 2025-02-07 PROCEDURE — 1123F ACP DISCUSS/DSCN MKR DOCD: CPT | Performed by: STUDENT IN AN ORGANIZED HEALTH CARE EDUCATION/TRAINING PROGRAM

## 2025-02-07 PROCEDURE — 3078F DIAST BP <80 MM HG: CPT | Performed by: STUDENT IN AN ORGANIZED HEALTH CARE EDUCATION/TRAINING PROGRAM

## 2025-02-07 PROCEDURE — 99214 OFFICE O/P EST MOD 30 MIN: CPT | Performed by: STUDENT IN AN ORGANIZED HEALTH CARE EDUCATION/TRAINING PROGRAM

## 2025-02-07 RX ORDER — OMEGA-3S/DHA/EPA/FISH OIL/D3 300MG-1000
400 CAPSULE ORAL DAILY
COMMUNITY

## 2025-02-07 RX ORDER — ALBUTEROL SULFATE 90 UG/1
2 INHALANT RESPIRATORY (INHALATION) EVERY 6 HOURS PRN
COMMUNITY

## 2025-02-07 RX ORDER — AMPICILLIN TRIHYDRATE 250 MG
CAPSULE ORAL DAILY
COMMUNITY

## 2025-02-07 RX ORDER — ASCORBIC ACID 500 MG
500 TABLET ORAL DAILY
COMMUNITY

## 2025-02-07 RX ORDER — ROSUVASTATIN CALCIUM 20 MG/1
20 TABLET, COATED ORAL DAILY
COMMUNITY
End: 2025-02-10 | Stop reason: SDUPTHER

## 2025-02-07 RX ORDER — HYDROCHLOROTHIAZIDE 12.5 MG/1
12.5 TABLET ORAL DAILY
COMMUNITY
End: 2025-02-10 | Stop reason: SDUPTHER

## 2025-02-07 RX ORDER — LIDOCAINE 50 MG/G
1 PATCH TOPICAL DAILY
Qty: 10 PATCH | Refills: 0 | Status: SHIPPED | OUTPATIENT
Start: 2025-02-07 | End: 2025-02-17

## 2025-02-07 RX ORDER — FOLIC ACID 0.4 MG
400 TABLET ORAL DAILY
COMMUNITY

## 2025-02-07 RX ORDER — GUAIFENESIN AND DEXTROMETHORPHAN HYDROBROMIDE 20; 400 MG/1; MG/1
1 TABLET ORAL EVERY 4 HOURS PRN
COMMUNITY

## 2025-02-07 RX ORDER — ZINC SULFATE 50(220)MG
50 CAPSULE ORAL DAILY
COMMUNITY

## 2025-02-07 RX ORDER — UMECLIDINIUM BROMIDE AND VILANTEROL TRIFENATATE 62.5; 25 UG/1; UG/1
1 POWDER RESPIRATORY (INHALATION) DAILY
COMMUNITY

## 2025-02-07 RX ORDER — CETIRIZINE HYDROCHLORIDE 5 MG/1
5 TABLET ORAL DAILY
COMMUNITY

## 2025-02-07 RX ORDER — AMLODIPINE BESYLATE 10 MG/1
10 TABLET ORAL DAILY
COMMUNITY
End: 2025-02-10 | Stop reason: SDUPTHER

## 2025-02-07 RX ORDER — MECOBAL/LEVOMEFOLAT CA/B6 PHOS 2-3-35 MG
TABLET ORAL
COMMUNITY

## 2025-02-07 RX ORDER — ASPIRIN 325 MG
325 TABLET ORAL DAILY
COMMUNITY

## 2025-02-07 ASSESSMENT — PATIENT HEALTH QUESTIONNAIRE - PHQ9
1. LITTLE INTEREST OR PLEASURE IN DOING THINGS: NOT AT ALL
SUM OF ALL RESPONSES TO PHQ QUESTIONS 1-9: 0
SUM OF ALL RESPONSES TO PHQ QUESTIONS 1-9: 0
2. FEELING DOWN, DEPRESSED OR HOPELESS: NOT AT ALL
SUM OF ALL RESPONSES TO PHQ QUESTIONS 1-9: 0
SUM OF ALL RESPONSES TO PHQ QUESTIONS 1-9: 0
SUM OF ALL RESPONSES TO PHQ9 QUESTIONS 1 & 2: 0

## 2025-02-07 NOTE — PATIENT INSTRUCTIONS
Please call Central Scheduling [phone] to schedule your: x-ray of left knee    You have been referred to: podiatry  **Please allow up to 2 weeks for their office to call you and schedule. If you have not heard from them beyond 2 weeks, contact their office directly to schedule an appointment.

## 2025-02-08 LAB
BASOPHILS # BLD AUTO: 0.1 X10E3/UL (ref 0–0.2)
BASOPHILS NFR BLD AUTO: 1 %
EOSINOPHIL # BLD AUTO: 0.1 X10E3/UL (ref 0–0.4)
EOSINOPHIL NFR BLD AUTO: 2 %
ERYTHROCYTE [DISTWIDTH] IN BLOOD BY AUTOMATED COUNT: 12.5 % (ref 11.6–15.4)
HBA1C MFR BLD: 6.3 % (ref 4.8–5.6)
HCT VFR BLD AUTO: 46.5 % (ref 37.5–51)
HGB BLD-MCNC: 15.3 G/DL (ref 13–17.7)
IMM GRANULOCYTES # BLD AUTO: 0 X10E3/UL (ref 0–0.1)
IMM GRANULOCYTES NFR BLD AUTO: 0 %
LYMPHOCYTES # BLD AUTO: 1.5 X10E3/UL (ref 0.7–3.1)
LYMPHOCYTES NFR BLD AUTO: 21 %
MCH RBC QN AUTO: 31.8 PG (ref 26.6–33)
MCHC RBC AUTO-ENTMCNC: 32.9 G/DL (ref 31.5–35.7)
MCV RBC AUTO: 97 FL (ref 79–97)
MONOCYTES # BLD AUTO: 0.8 X10E3/UL (ref 0.1–0.9)
MONOCYTES NFR BLD AUTO: 11 %
NEUTROPHILS # BLD AUTO: 4.7 X10E3/UL (ref 1.4–7)
NEUTROPHILS NFR BLD AUTO: 65 %
PLATELET # BLD AUTO: 229 X10E3/UL (ref 150–450)
RBC # BLD AUTO: 4.81 X10E6/UL (ref 4.14–5.8)
WBC # BLD AUTO: 7.2 X10E3/UL (ref 3.4–10.8)

## 2025-02-10 PROBLEM — E66.3 OVERWEIGHT (BMI 25.0-29.9): Status: ACTIVE | Noted: 2025-02-10

## 2025-02-10 PROBLEM — Z91.81 AT HIGH RISK FOR FALLS: Status: ACTIVE | Noted: 2025-02-10

## 2025-02-10 PROBLEM — S98.111A AMPUTATION OF RIGHT GREAT TOE (HCC): Status: ACTIVE | Noted: 2025-02-10

## 2025-02-10 PROBLEM — L84 FOOT CALLUS: Status: ACTIVE | Noted: 2025-02-10

## 2025-02-10 PROBLEM — E78.2 MIXED HYPERLIPIDEMIA: Status: ACTIVE | Noted: 2025-02-10

## 2025-02-10 PROBLEM — I10 PRIMARY HYPERTENSION: Status: ACTIVE | Noted: 2025-02-10

## 2025-02-10 LAB
BUN SERPL-MCNC: 13 MG/DL (ref 8–27)
BUN/CREAT SERPL: 12 (ref 10–24)
CALCIUM SERPL-MCNC: 9.6 MG/DL (ref 8.6–10.2)
CHLORIDE SERPL-SCNC: 103 MMOL/L (ref 96–106)
CO2 SERPL-SCNC: 25 MMOL/L (ref 20–29)
CREAT SERPL-MCNC: 1.05 MG/DL (ref 0.76–1.27)
EGFRCR SERPLBLD CKD-EPI 2021: 76 ML/MIN/1.73
GLUCOSE SERPL-MCNC: 91 MG/DL (ref 70–99)
POTASSIUM SERPL-SCNC: 4 MMOL/L (ref 3.5–5.2)
SODIUM SERPL-SCNC: 141 MMOL/L (ref 134–144)

## 2025-02-10 RX ORDER — HYDROCHLOROTHIAZIDE 12.5 MG/1
12.5 TABLET ORAL DAILY
Qty: 30 TABLET | Refills: 3 | Status: SHIPPED | OUTPATIENT
Start: 2025-02-10

## 2025-02-10 RX ORDER — AMLODIPINE BESYLATE 10 MG/1
10 TABLET ORAL DAILY
Qty: 30 TABLET | Refills: 3 | Status: SHIPPED | OUTPATIENT
Start: 2025-02-10

## 2025-02-10 RX ORDER — ROSUVASTATIN CALCIUM 20 MG/1
20 TABLET, COATED ORAL DAILY
Qty: 30 TABLET | Refills: 3 | Status: SHIPPED | OUTPATIENT
Start: 2025-02-10

## 2025-02-10 NOTE — ASSESSMENT & PLAN NOTE
Continue current medication regimen, crestor. No new myalgias or GI upset on current medications. Medication compliance: Yes. Patient is following a low fat, low cholesterol diet. Encouraged regular exercise.

## 2025-02-10 NOTE — ASSESSMENT & PLAN NOTE
BMI in office today 26.73. Provided counseling on lifestyle intervention - establishing healthier eating habits and regular exercise routine; Goal: 5% weight loss. Educational Material provided. Will revisit topic during next office visit.

## 2025-02-10 NOTE — ASSESSMENT & PLAN NOTE
Patient endorses feeling unsteady on his feet, he does not ambulate with any additional assistance.  We discussed the benefits of physical therapy, he declines at this time.

## 2025-02-10 NOTE — ASSESSMENT & PLAN NOTE
Blood pressure in office today 167/79, repeat 163/80, refill sent to pharmacy.  Follow-up in 1 month to reassess pressures. Patient provided with blood pressure log to monitor and record readings at home. Patient directed to bring log into next office visit to review.

## 2025-02-10 NOTE — ASSESSMENT & PLAN NOTE
Patient has 2 foot calluses on his right foot, in addition to onychomycosis of great toe.  Will refer to podiatry for further management.

## 2025-02-15 LAB
CHOLEST SERPL-MCNC: 136 MG/DL (ref 100–199)
HDLC SERPL-MCNC: 71 MG/DL
LDLC SERPL CALC-MCNC: 50 MG/DL (ref 0–99)
TRIGL SERPL-MCNC: 74 MG/DL (ref 0–149)
VLDLC SERPL CALC-MCNC: 15 MG/DL (ref 5–40)

## 2025-02-15 NOTE — RESULT ENCOUNTER NOTE
Please notify patient.  No anemia  Renal function normal  Lipid panel looks excellent  Prediabetes detected, but back on sweets  No change in current management/meds.

## 2025-05-01 RX ORDER — UMECLIDINIUM BROMIDE AND VILANTEROL TRIFENATATE 62.5; 25 UG/1; UG/1
POWDER RESPIRATORY (INHALATION)
Qty: 60 EACH | Refills: 1 | Status: SHIPPED | OUTPATIENT
Start: 2025-05-01

## 2025-05-23 NOTE — PROGRESS NOTES
Medicare Annual Wellness Visit    Luis Lubin is here for Medicare AWV    Assessment & Plan   Medicare annual wellness visit, subsequent  Primary hypertension  Assessment & Plan:  Blood pressure in office today 118/71, significantly improved compared to last office visit.  Continue Norvasc 10 mg, hydrochlorothiazide 12.5 mg.  Orders:  -     Albumin/Creatinine Ratio, Urine  Colon cancer screening  Assessment & Plan:  Due now, Cologuard ordered today.  Orders:  -     Cologuard (Fecal DNA Colorectal Cancer Screening)  Prediabetes  Assessment & Plan:  A1c 6.3%, 2/7/25. Discussed significance of this dx, and urged healthy lifestyle habits to prevent progression to DM. Rec low carb diet an weight loss. Patient education provided.        Return in about 3 months (around 8/30/2025) for labs (PSA, CMP).     Subjective       Patient's complete Health Risk Assessment and screening values have been reviewed and are found in Flowsheets. The following problems were reviewed today and where indicated follow up appointments were made and/or referrals ordered.    Positive Risk Factor Screenings with Interventions:    Fall Risk:  Do you feel unsteady or are you worried about falling? : (!) yes  2 or more falls in past year?: (!) yes  Fall with injury in past year?: (!) yes                 Dentist Screen:  Have you seen the dentist within the past year?: (!) No   Vision Screen:  Do you have difficulty driving, watching TV, or doing any of your daily activities because of your eyesight?: No  Have you had an eye exam within the past year?: (!) No      Advanced Directives:  Do you have a Living Will?: (!) No  Tobacco Use:    Tobacco Use      Smoking status: Every Day        Packs/day: 1.00        Years: 1 pack/day for 58.4 years (58.4 ttl pk-yrs)        Types: Cigarettes        Start date: 1967        Passive exposure: Current      Smokeless tobacco: Never     Interventions:  See AVS for additional education material

## 2025-05-30 ENCOUNTER — OFFICE VISIT (OUTPATIENT)
Facility: CLINIC | Age: 70
End: 2025-05-30
Payer: MEDICARE

## 2025-05-30 VITALS
HEART RATE: 88 BPM | HEIGHT: 76 IN | BODY MASS INDEX: 25.82 KG/M2 | OXYGEN SATURATION: 98 % | WEIGHT: 212 LBS | DIASTOLIC BLOOD PRESSURE: 71 MMHG | SYSTOLIC BLOOD PRESSURE: 118 MMHG

## 2025-05-30 DIAGNOSIS — Z00.00 MEDICARE ANNUAL WELLNESS VISIT, SUBSEQUENT: Primary | ICD-10-CM

## 2025-05-30 DIAGNOSIS — I10 PRIMARY HYPERTENSION: ICD-10-CM

## 2025-05-30 DIAGNOSIS — Z12.11 COLON CANCER SCREENING: ICD-10-CM

## 2025-05-30 DIAGNOSIS — R73.03 PREDIABETES: ICD-10-CM

## 2025-05-30 PROCEDURE — 3074F SYST BP LT 130 MM HG: CPT | Performed by: STUDENT IN AN ORGANIZED HEALTH CARE EDUCATION/TRAINING PROGRAM

## 2025-05-30 PROCEDURE — 99214 OFFICE O/P EST MOD 30 MIN: CPT | Performed by: STUDENT IN AN ORGANIZED HEALTH CARE EDUCATION/TRAINING PROGRAM

## 2025-05-30 PROCEDURE — 1123F ACP DISCUSS/DSCN MKR DOCD: CPT | Performed by: STUDENT IN AN ORGANIZED HEALTH CARE EDUCATION/TRAINING PROGRAM

## 2025-05-30 PROCEDURE — 3078F DIAST BP <80 MM HG: CPT | Performed by: STUDENT IN AN ORGANIZED HEALTH CARE EDUCATION/TRAINING PROGRAM

## 2025-05-30 PROCEDURE — 1160F RVW MEDS BY RX/DR IN RCRD: CPT | Performed by: STUDENT IN AN ORGANIZED HEALTH CARE EDUCATION/TRAINING PROGRAM

## 2025-05-30 PROCEDURE — G0439 PPPS, SUBSEQ VISIT: HCPCS | Performed by: STUDENT IN AN ORGANIZED HEALTH CARE EDUCATION/TRAINING PROGRAM

## 2025-05-30 PROCEDURE — 1159F MED LIST DOCD IN RCRD: CPT | Performed by: STUDENT IN AN ORGANIZED HEALTH CARE EDUCATION/TRAINING PROGRAM

## 2025-05-30 RX ORDER — MUPIROCIN 20 MG/G
OINTMENT TOPICAL
COMMUNITY
Start: 2025-03-12

## 2025-05-30 SDOH — ECONOMIC STABILITY: FOOD INSECURITY: WITHIN THE PAST 12 MONTHS, THE FOOD YOU BOUGHT JUST DIDN'T LAST AND YOU DIDN'T HAVE MONEY TO GET MORE.: NEVER TRUE

## 2025-05-30 SDOH — ECONOMIC STABILITY: FOOD INSECURITY: WITHIN THE PAST 12 MONTHS, YOU WORRIED THAT YOUR FOOD WOULD RUN OUT BEFORE YOU GOT MONEY TO BUY MORE.: NEVER TRUE

## 2025-05-30 ASSESSMENT — PATIENT HEALTH QUESTIONNAIRE - PHQ9
SUM OF ALL RESPONSES TO PHQ QUESTIONS 1-9: 0
1. LITTLE INTEREST OR PLEASURE IN DOING THINGS: NOT AT ALL
SUM OF ALL RESPONSES TO PHQ QUESTIONS 1-9: 0
SUM OF ALL RESPONSES TO PHQ QUESTIONS 1-9: 0
2. FEELING DOWN, DEPRESSED OR HOPELESS: NOT AT ALL
SUM OF ALL RESPONSES TO PHQ QUESTIONS 1-9: 0

## 2025-05-30 ASSESSMENT — LIFESTYLE VARIABLES
HOW OFTEN DO YOU HAVE A DRINK CONTAINING ALCOHOL: MONTHLY OR LESS
HOW MANY STANDARD DRINKS CONTAINING ALCOHOL DO YOU HAVE ON A TYPICAL DAY: 1 OR 2

## 2025-05-30 NOTE — PROGRESS NOTES
Chief Complaint   Patient presents with    Medicare AWV         Health Maintenance Due   Topic Date Due    Hepatitis C screen  Never done    DTaP/Tdap/Td vaccine (1 - Tdap) Never done    Pneumococcal 50+ years Vaccine (1 of 2 - PCV) Never done    Colorectal Cancer Screen  Never done    Shingles vaccine (1 of 2) Never done    AAA screen  Never done    COVID-19 Vaccine (1 - 2024-25 season) Never done    Annual Wellness Visit (Medicare Advantage)  Never done         \"Have you been to the ER, urgent care clinic since your last visit?  Hospitalized since your last visit?\"    NO    “Have you seen or consulted any other health care providers outside of [facility] since your last visit?”    NO    “Have you had a colorectal cancer screening such as a colonoscopy/FIT/Cologuard?    NO    No colonoscopy on file  No cologuard on file  No FIT/FOBT on file   No flexible sigmoidoscopy on file

## 2025-05-30 NOTE — PROGRESS NOTES
Luis Lubin a 70 y.o. male  has a past medical history of Hyperlipidemia and Hypertension. presents to office today for BP check.     Subjective:    History of Present Illness    Patient reports that he was able to re-consult with Dr. Reid. He reports a gradual improvement in his condition, attributing it to the use of new, extra-wide shoes with insoles. He continues to manage calluses on his feet. Prior to the acquisition of the new insoles, he experienced balance issues, which have since resolved.     He has been engaging in physical activities under the guidance of a , including push-ups and chair exercises. He expresses a desire to continue these exercises at home but lacks the necessary equipment. He also reports leg fatigue after prolonged walking.    His A1c level has remained elevated for the past 25 years, despite abstaining from soda consumption.    He has a history of diverticulitis and underwent a colon resection. He was advised to return for a follow-up but did not comply.    He has a history of prostatectomy and is no longer under the care of a urologist.             There is no immunization history on file for this patient.   Past Surgical History:   Procedure Laterality Date    COLON SURGERY      FOOT SURGERY      PROSTATE SURGERY       Social History     Socioeconomic History    Marital status:      Spouse name: None    Number of children: None    Years of education: None    Highest education level: None   Tobacco Use    Smoking status: Every Day     Current packs/day: 1.00     Average packs/day: 1 pack/day for 58.4 years (58.4 ttl pk-yrs)     Types: Cigarettes     Start date: 1967     Passive exposure: Current    Smokeless tobacco: Never   Vaping Use    Vaping status: Never Used   Substance and Sexual Activity    Alcohol use: Not Currently    Drug use: Not Currently    Sexual activity: Not Currently     Partners: Female     Social [employer]     Food

## 2025-05-31 LAB
ALBUMIN/CREAT UR: 25 MG/G CREAT (ref 0–29)
CREAT UR-MCNC: 201.6 MG/DL
MICROALBUMIN UR-MCNC: 51.2 UG/ML

## 2025-06-02 PROBLEM — R73.03 PREDIABETES: Status: ACTIVE | Noted: 2025-06-02

## 2025-06-02 PROBLEM — Z12.11 COLON CANCER SCREENING: Status: ACTIVE | Noted: 2025-06-02

## 2025-06-02 NOTE — ASSESSMENT & PLAN NOTE
Blood pressure in office today 118/71, significantly improved compared to last office visit.  Continue Norvasc 10 mg, hydrochlorothiazide 12.5 mg.

## 2025-06-02 NOTE — ASSESSMENT & PLAN NOTE
A1c 6.3%, 2/7/25. Discussed significance of this dx, and urged healthy lifestyle habits to prevent progression to DM. Rec low carb diet an weight loss. Patient education provided.

## 2025-07-02 PROBLEM — Z12.11 COLON CANCER SCREENING: Status: RESOLVED | Noted: 2025-06-02 | Resolved: 2025-07-02

## 2025-07-12 NOTE — BRIEF OP NOTE
Brief Postoperative Note      Patient: Ranulfo Hernandez  YOB: 1955  MRN: [id number]    Date of Procedure: 9/28/2024    Pre-Op Diagnosis Codes:      * Osteomyelitis of right foot, unspecified type [M86.9]    Post-Op Diagnosis: Same       Procedure(s):  INCISION AND DRAINAGE WITH AMPUTATION GREAT TOE RIGHT FOOT    Surgeon(s):  Juan Pablo Antoine DPM    Assistant:  * No surgical staff found *    Anesthesia: General    Estimated Blood Loss (mL): Minimal    Complications: None    Specimens:   ID Type Source Tests Collected by Time Destination   1 : Great Toe Right Foot Tissue Toe SURGICAL PATHOLOGY Juan Pablo Antoine DPM 9/28/2024 0834    A : Culture Right Great Toe Swab Toe CULTURE, FUNGUS, CULTURE, WOUND Juan Pablo Antoine DPM 9/28/2024 0834        Implants:  None      Drains: None    Findings:  Infection Present At Time Of Surgery (PATOS) (choose all levels that have infection present):  - Deep Infection (muscle/fascia) present as evidenced by abscess  Other Findings:     Electronically signed by Juan Pablo Antoine DPM on 9/28/2024 at 8:58 AM  
Opt out

## 2025-07-21 ENCOUNTER — HOSPITAL ENCOUNTER (INPATIENT)
Facility: HOSPITAL | Age: 70
LOS: 5 days | Discharge: HOME | DRG: 191 | End: 2025-07-26
Attending: EMERGENCY MEDICINE | Admitting: STUDENT IN AN ORGANIZED HEALTH CARE EDUCATION/TRAINING PROGRAM
Payer: MEDICARE

## 2025-07-21 ENCOUNTER — APPOINTMENT (OUTPATIENT)
Facility: HOSPITAL | Age: 70
DRG: 191 | End: 2025-07-21
Payer: MEDICARE

## 2025-07-21 DIAGNOSIS — R06.02 SHORTNESS OF BREATH: ICD-10-CM

## 2025-07-21 DIAGNOSIS — I48.0 PAROXYSMAL ATRIAL FIBRILLATION (HCC): ICD-10-CM

## 2025-07-21 DIAGNOSIS — I48.91 ATRIAL FIBRILLATION WITH RAPID VENTRICULAR RESPONSE (HCC): ICD-10-CM

## 2025-07-21 DIAGNOSIS — J44.1 COPD EXACERBATION (HCC): Primary | ICD-10-CM

## 2025-07-21 LAB
ALBUMIN SERPL-MCNC: 3.9 G/DL (ref 3.5–5)
ALBUMIN/GLOB SERPL: 1.1 (ref 1.1–2.2)
ALP SERPL-CCNC: 171 U/L (ref 45–117)
ALT SERPL-CCNC: 32 U/L (ref 12–78)
ANION GAP SERPL CALC-SCNC: 5 MMOL/L (ref 2–12)
AST SERPL-CCNC: 20 U/L (ref 15–37)
BASOPHILS # BLD: 0.08 K/UL (ref 0–0.1)
BASOPHILS NFR BLD: 0.8 % (ref 0–1)
BILIRUB SERPL-MCNC: 0.7 MG/DL (ref 0.2–1)
BUN SERPL-MCNC: 12 MG/DL (ref 6–20)
BUN/CREAT SERPL: 13 (ref 12–20)
CALCIUM SERPL-MCNC: 9.8 MG/DL (ref 8.5–10.1)
CHLORIDE SERPL-SCNC: 106 MMOL/L (ref 97–108)
CO2 SERPL-SCNC: 28 MMOL/L (ref 21–32)
CREAT SERPL-MCNC: 0.89 MG/DL (ref 0.7–1.3)
DIFFERENTIAL METHOD BLD: ABNORMAL
EKG ATRIAL RATE: 147 BPM
EKG DIAGNOSIS: NORMAL
EKG P AXIS: 262 DEGREES
EKG Q-T INTERVAL: 278 MS
EKG QRS DURATION: 84 MS
EKG QTC CALCULATION (BAZETT): 450 MS
EKG R AXIS: 80 DEGREES
EKG T AXIS: 75 DEGREES
EKG VENTRICULAR RATE: 158 BPM
EOSINOPHIL # BLD: 0.75 K/UL (ref 0–0.4)
EOSINOPHIL NFR BLD: 7.1 % (ref 0–7)
ERYTHROCYTE [DISTWIDTH] IN BLOOD BY AUTOMATED COUNT: 14.1 % (ref 11.5–14.5)
GLOBULIN SER CALC-MCNC: 3.7 G/DL (ref 2–4)
GLUCOSE SERPL-MCNC: 146 MG/DL (ref 65–100)
HCT VFR BLD AUTO: 51.8 % (ref 36.6–50.3)
HGB BLD-MCNC: 17.3 G/DL (ref 12.1–17)
IMM GRANULOCYTES # BLD AUTO: 0.04 K/UL (ref 0–0.04)
IMM GRANULOCYTES NFR BLD AUTO: 0.4 % (ref 0–0.5)
LYMPHOCYTES # BLD: 1.55 K/UL (ref 0.8–3.5)
LYMPHOCYTES NFR BLD: 14.7 % (ref 12–49)
MAGNESIUM SERPL-MCNC: 2.2 MG/DL (ref 1.6–2.4)
MCH RBC QN AUTO: 32.6 PG (ref 26–34)
MCHC RBC AUTO-ENTMCNC: 33.4 G/DL (ref 30–36.5)
MCV RBC AUTO: 97.7 FL (ref 80–99)
MONOCYTES # BLD: 0.95 K/UL (ref 0–1)
MONOCYTES NFR BLD: 9 % (ref 5–13)
NEUTS SEG # BLD: 7.15 K/UL (ref 1.8–8)
NEUTS SEG NFR BLD: 68 % (ref 32–75)
NRBC # BLD: 0 K/UL (ref 0–0.01)
NRBC BLD-RTO: 0 PER 100 WBC
NT PRO BNP: 412 PG/ML
PHOSPHATE SERPL-MCNC: 3.8 MG/DL (ref 2.6–4.7)
PLATELET # BLD AUTO: 247 K/UL (ref 150–400)
PMV BLD AUTO: 9.4 FL (ref 8.9–12.9)
POTASSIUM SERPL-SCNC: 3.4 MMOL/L (ref 3.5–5.1)
PROCALCITONIN SERPL-MCNC: <0.05 NG/ML
PROT SERPL-MCNC: 7.6 G/DL (ref 6.4–8.2)
RBC # BLD AUTO: 5.3 M/UL (ref 4.1–5.7)
SODIUM SERPL-SCNC: 139 MMOL/L (ref 136–145)
T4 FREE SERPL-MCNC: 1.2 NG/DL (ref 0.8–1.5)
TROPONIN I SERPL HS-MCNC: 23 NG/L (ref 0–76)
TSH SERPL DL<=0.05 MIU/L-ACNC: 1.17 UIU/ML (ref 0.36–3.74)
TSH SERPL DL<=0.05 MIU/L-ACNC: 1.2 UIU/ML (ref 0.36–3.74)
WBC # BLD AUTO: 10.5 K/UL (ref 4.1–11.1)

## 2025-07-21 PROCEDURE — 2500000003 HC RX 250 WO HCPCS: Performed by: STUDENT IN AN ORGANIZED HEALTH CARE EDUCATION/TRAINING PROGRAM

## 2025-07-21 PROCEDURE — 6360000002 HC RX W HCPCS

## 2025-07-21 PROCEDURE — 6360000002 HC RX W HCPCS: Performed by: EMERGENCY MEDICINE

## 2025-07-21 PROCEDURE — 83880 ASSAY OF NATRIURETIC PEPTIDE: CPT

## 2025-07-21 PROCEDURE — 36415 COLL VENOUS BLD VENIPUNCTURE: CPT

## 2025-07-21 PROCEDURE — 83735 ASSAY OF MAGNESIUM: CPT

## 2025-07-21 PROCEDURE — 80053 COMPREHEN METABOLIC PANEL: CPT

## 2025-07-21 PROCEDURE — 96376 TX/PRO/DX INJ SAME DRUG ADON: CPT

## 2025-07-21 PROCEDURE — 6370000000 HC RX 637 (ALT 250 FOR IP): Performed by: EMERGENCY MEDICINE

## 2025-07-21 PROCEDURE — 84484 ASSAY OF TROPONIN QUANT: CPT

## 2025-07-21 PROCEDURE — 96375 TX/PRO/DX INJ NEW DRUG ADDON: CPT

## 2025-07-21 PROCEDURE — 87899 AGENT NOS ASSAY W/OPTIC: CPT

## 2025-07-21 PROCEDURE — 84443 ASSAY THYROID STIM HORMONE: CPT

## 2025-07-21 PROCEDURE — 85025 COMPLETE CBC W/AUTO DIFF WBC: CPT

## 2025-07-21 PROCEDURE — 6360000002 HC RX W HCPCS: Performed by: STUDENT IN AN ORGANIZED HEALTH CARE EDUCATION/TRAINING PROGRAM

## 2025-07-21 PROCEDURE — 94640 AIRWAY INHALATION TREATMENT: CPT

## 2025-07-21 PROCEDURE — 71045 X-RAY EXAM CHEST 1 VIEW: CPT

## 2025-07-21 PROCEDURE — 93005 ELECTROCARDIOGRAM TRACING: CPT

## 2025-07-21 PROCEDURE — 84439 ASSAY OF FREE THYROXINE: CPT

## 2025-07-21 PROCEDURE — 6370000000 HC RX 637 (ALT 250 FOR IP): Performed by: STUDENT IN AN ORGANIZED HEALTH CARE EDUCATION/TRAINING PROGRAM

## 2025-07-21 PROCEDURE — 2060000000 HC ICU INTERMEDIATE R&B

## 2025-07-21 PROCEDURE — 87205 SMEAR GRAM STAIN: CPT

## 2025-07-21 PROCEDURE — 96365 THER/PROPH/DIAG IV INF INIT: CPT

## 2025-07-21 PROCEDURE — 2580000003 HC RX 258: Performed by: STUDENT IN AN ORGANIZED HEALTH CARE EDUCATION/TRAINING PROGRAM

## 2025-07-21 PROCEDURE — 84145 PROCALCITONIN (PCT): CPT

## 2025-07-21 PROCEDURE — 2580000003 HC RX 258: Performed by: EMERGENCY MEDICINE

## 2025-07-21 PROCEDURE — 87581 M.PNEUMON DNA AMP PROBE: CPT

## 2025-07-21 PROCEDURE — 84100 ASSAY OF PHOSPHORUS: CPT

## 2025-07-21 PROCEDURE — 99285 EMERGENCY DEPT VISIT HI MDM: CPT

## 2025-07-21 PROCEDURE — 87070 CULTURE OTHR SPECIMN AEROBIC: CPT

## 2025-07-21 RX ORDER — DILTIAZEM HYDROCHLORIDE 5 MG/ML
20 INJECTION INTRAVENOUS
Status: COMPLETED | OUTPATIENT
Start: 2025-07-21 | End: 2025-07-21

## 2025-07-21 RX ORDER — SODIUM CHLORIDE 0.9 % (FLUSH) 0.9 %
5-40 SYRINGE (ML) INJECTION EVERY 12 HOURS SCHEDULED
Status: DISCONTINUED | OUTPATIENT
Start: 2025-07-21 | End: 2025-07-26 | Stop reason: HOSPADM

## 2025-07-21 RX ORDER — 0.9 % SODIUM CHLORIDE 0.9 %
1000 INTRAVENOUS SOLUTION INTRAVENOUS ONCE
Status: COMPLETED | OUTPATIENT
Start: 2025-07-21 | End: 2025-07-21

## 2025-07-21 RX ORDER — POTASSIUM CHLORIDE 7.45 MG/ML
10 INJECTION INTRAVENOUS PRN
Status: DISCONTINUED | OUTPATIENT
Start: 2025-07-21 | End: 2025-07-26 | Stop reason: HOSPADM

## 2025-07-21 RX ORDER — GAUZE BANDAGE 2" X 2"
100 BANDAGE TOPICAL DAILY
Status: DISCONTINUED | OUTPATIENT
Start: 2025-07-21 | End: 2025-07-26 | Stop reason: HOSPADM

## 2025-07-21 RX ORDER — SODIUM CHLORIDE 9 MG/ML
INJECTION, SOLUTION INTRAVENOUS PRN
Status: DISCONTINUED | OUTPATIENT
Start: 2025-07-21 | End: 2025-07-26 | Stop reason: HOSPADM

## 2025-07-21 RX ORDER — LORAZEPAM 1 MG/1
3 TABLET ORAL
Status: DISCONTINUED | OUTPATIENT
Start: 2025-07-21 | End: 2025-07-23

## 2025-07-21 RX ORDER — SODIUM CHLORIDE 0.9 % (FLUSH) 0.9 %
5-40 SYRINGE (ML) INJECTION PRN
Status: DISCONTINUED | OUTPATIENT
Start: 2025-07-21 | End: 2025-07-26 | Stop reason: HOSPADM

## 2025-07-21 RX ORDER — METHYLPREDNISOLONE SODIUM SUCCINATE 125 MG/2ML
125 INJECTION INTRAMUSCULAR; INTRAVENOUS ONCE
Status: COMPLETED | OUTPATIENT
Start: 2025-07-21 | End: 2025-07-21

## 2025-07-21 RX ORDER — ONDANSETRON 4 MG/1
4 TABLET, ORALLY DISINTEGRATING ORAL EVERY 8 HOURS PRN
Status: DISCONTINUED | OUTPATIENT
Start: 2025-07-21 | End: 2025-07-26 | Stop reason: HOSPADM

## 2025-07-21 RX ORDER — ONDANSETRON 2 MG/ML
4 INJECTION INTRAMUSCULAR; INTRAVENOUS EVERY 6 HOURS PRN
Status: DISCONTINUED | OUTPATIENT
Start: 2025-07-21 | End: 2025-07-26 | Stop reason: HOSPADM

## 2025-07-21 RX ORDER — DIAZEPAM 10 MG/2ML
5 INJECTION, SOLUTION INTRAMUSCULAR; INTRAVENOUS EVERY 4 HOURS PRN
Status: DISCONTINUED | OUTPATIENT
Start: 2025-07-21 | End: 2025-07-23

## 2025-07-21 RX ORDER — LORAZEPAM 1 MG/1
4 TABLET ORAL
Status: DISCONTINUED | OUTPATIENT
Start: 2025-07-21 | End: 2025-07-23

## 2025-07-21 RX ORDER — LEVALBUTEROL INHALATION SOLUTION 1.25 MG/3ML
1.25 SOLUTION RESPIRATORY (INHALATION) EVERY 8 HOURS PRN
Status: ACTIVE | OUTPATIENT
Start: 2025-07-21 | End: 2025-07-22

## 2025-07-21 RX ORDER — GUAIFENESIN 200 MG/10ML
400 LIQUID ORAL
Status: COMPLETED | OUTPATIENT
Start: 2025-07-21 | End: 2025-07-22

## 2025-07-21 RX ORDER — ACETAMINOPHEN 650 MG/1
650 SUPPOSITORY RECTAL EVERY 6 HOURS PRN
Status: DISCONTINUED | OUTPATIENT
Start: 2025-07-21 | End: 2025-07-26 | Stop reason: HOSPADM

## 2025-07-21 RX ORDER — LORAZEPAM 1 MG/1
1 TABLET ORAL
Status: DISCONTINUED | OUTPATIENT
Start: 2025-07-21 | End: 2025-07-23

## 2025-07-21 RX ORDER — POLYETHYLENE GLYCOL 3350 17 G/17G
17 POWDER, FOR SOLUTION ORAL DAILY PRN
Status: DISCONTINUED | OUTPATIENT
Start: 2025-07-21 | End: 2025-07-26 | Stop reason: HOSPADM

## 2025-07-21 RX ORDER — MAGNESIUM SULFATE IN WATER 40 MG/ML
2000 INJECTION, SOLUTION INTRAVENOUS PRN
Status: DISCONTINUED | OUTPATIENT
Start: 2025-07-21 | End: 2025-07-26 | Stop reason: HOSPADM

## 2025-07-21 RX ORDER — LORAZEPAM 1 MG/1
2 TABLET ORAL
Status: DISCONTINUED | OUTPATIENT
Start: 2025-07-21 | End: 2025-07-23

## 2025-07-21 RX ORDER — ENOXAPARIN SODIUM 100 MG/ML
1 INJECTION SUBCUTANEOUS 2 TIMES DAILY
Status: DISCONTINUED | OUTPATIENT
Start: 2025-07-21 | End: 2025-07-22

## 2025-07-21 RX ORDER — POTASSIUM CHLORIDE 1.5 G/1.58G
40 POWDER, FOR SOLUTION ORAL ONCE
Status: COMPLETED | OUTPATIENT
Start: 2025-07-21 | End: 2025-07-21

## 2025-07-21 RX ORDER — IPRATROPIUM BROMIDE AND ALBUTEROL SULFATE 2.5; .5 MG/3ML; MG/3ML
3 SOLUTION RESPIRATORY (INHALATION)
Status: COMPLETED | OUTPATIENT
Start: 2025-07-21 | End: 2025-07-21

## 2025-07-21 RX ORDER — POTASSIUM CHLORIDE 1500 MG/1
40 TABLET, EXTENDED RELEASE ORAL PRN
Status: DISCONTINUED | OUTPATIENT
Start: 2025-07-21 | End: 2025-07-26 | Stop reason: HOSPADM

## 2025-07-21 RX ORDER — ACETAMINOPHEN 325 MG/1
650 TABLET ORAL EVERY 6 HOURS PRN
Status: DISCONTINUED | OUTPATIENT
Start: 2025-07-21 | End: 2025-07-26 | Stop reason: HOSPADM

## 2025-07-21 RX ORDER — NICOTINE 21 MG/24HR
1 PATCH, TRANSDERMAL 24 HOURS TRANSDERMAL EVERY 24 HOURS
COMMUNITY

## 2025-07-21 RX ORDER — ENOXAPARIN SODIUM 100 MG/ML
1 INJECTION SUBCUTANEOUS
Status: COMPLETED | OUTPATIENT
Start: 2025-07-21 | End: 2025-07-21

## 2025-07-21 RX ORDER — UMECLIDINIUM BROMIDE AND VILANTEROL TRIFENATATE 62.5; 25 UG/1; UG/1
POWDER RESPIRATORY (INHALATION)
Qty: 60 EACH | Refills: 3 | Status: SHIPPED | OUTPATIENT
Start: 2025-07-21

## 2025-07-21 RX ADMIN — IPRATROPIUM BROMIDE AND ALBUTEROL SULFATE 3 DOSE: .5; 3 SOLUTION RESPIRATORY (INHALATION) at 10:39

## 2025-07-21 RX ADMIN — GUAIFENESIN 400 MG: 200 SOLUTION ORAL at 14:38

## 2025-07-21 RX ADMIN — ENOXAPARIN SODIUM 100 MG: 100 INJECTION SUBCUTANEOUS at 20:40

## 2025-07-21 RX ADMIN — AZITHROMYCIN MONOHYDRATE 500 MG: 500 INJECTION, POWDER, LYOPHILIZED, FOR SOLUTION INTRAVENOUS at 14:29

## 2025-07-21 RX ADMIN — SODIUM CHLORIDE 1000 ML: 0.9 INJECTION, SOLUTION INTRAVENOUS at 12:02

## 2025-07-21 RX ADMIN — Medication 100 MG: at 14:37

## 2025-07-21 RX ADMIN — GUAIFENESIN 400 MG: 200 SOLUTION ORAL at 20:19

## 2025-07-21 RX ADMIN — GUAIFENESIN 400 MG: 200 SOLUTION ORAL at 18:51

## 2025-07-21 RX ADMIN — METHYLPREDNISOLONE SODIUM SUCCINATE 40 MG: 40 INJECTION, POWDER, LYOPHILIZED, FOR SOLUTION INTRAMUSCULAR; INTRAVENOUS at 22:55

## 2025-07-21 RX ADMIN — SODIUM CHLORIDE 5 MG/HR: 900 INJECTION, SOLUTION INTRAVENOUS at 11:10

## 2025-07-21 RX ADMIN — ARFORMOTEROL TARTRATE: 15 SOLUTION RESPIRATORY (INHALATION) at 19:28

## 2025-07-21 RX ADMIN — METHYLPREDNISOLONE SODIUM SUCCINATE 125 MG: 125 INJECTION INTRAMUSCULAR; INTRAVENOUS at 10:02

## 2025-07-21 RX ADMIN — SODIUM CHLORIDE 1000 ML: 0.9 INJECTION, SOLUTION INTRAVENOUS at 14:34

## 2025-07-21 RX ADMIN — GUAIFENESIN 400 MG: 200 SOLUTION ORAL at 16:27

## 2025-07-21 RX ADMIN — DILTIAZEM HYDROCHLORIDE 20 MG: 5 INJECTION, SOLUTION INTRAVENOUS at 09:57

## 2025-07-21 RX ADMIN — SODIUM CHLORIDE, PRESERVATIVE FREE 10 ML: 5 INJECTION INTRAVENOUS at 20:02

## 2025-07-21 RX ADMIN — POTASSIUM CHLORIDE 40 MEQ: 1.5 POWDER, FOR SOLUTION ORAL at 14:37

## 2025-07-21 RX ADMIN — ENOXAPARIN SODIUM 100 MG: 100 INJECTION SUBCUTANEOUS at 11:12

## 2025-07-21 RX ADMIN — IPRATROPIUM BROMIDE 0.5 MG: 0.5 SOLUTION RESPIRATORY (INHALATION) at 15:39

## 2025-07-21 RX ADMIN — IPRATROPIUM BROMIDE 0.5 MG: 0.5 SOLUTION RESPIRATORY (INHALATION) at 19:23

## 2025-07-21 ASSESSMENT — PAIN - FUNCTIONAL ASSESSMENT: PAIN_FUNCTIONAL_ASSESSMENT: NONE - DENIES PAIN

## 2025-07-21 NOTE — PROGRESS NOTES
Pharmacy Medication History Review    Medication history obtained by Laurie Shepherd while patient was in room ER11/11 and was completed based on information available during current patient encounter. Medication history was completed before home meds were reconciled by provider.    Pharmacist Admission Medication Reconciliation Recommendations:            PTA medication list was corrected to the following:   Prior to Admission Medications   Prescriptions Last Dose Informant   Coenzyme Q10 (COQ10) 200 MG CAPS 7/21/2025 Morning Self   Sig: Take 1 capsule by mouth daily   Multiple Vitamins-Minerals (THERAPEUTIC MULTIVITAMIN-MINERALS) tablet 7/21/2025 Self   Sig: Take 1 tablet by mouth daily   Omega 3 1000 MG CAPS 7/21/2025 Self   Sig: Take 1,000 mg by mouth daily   Turmeric 500 MG TABS 7/21/2025 Self   Sig: Take 1 capsule by mouth daily not taking   albuterol sulfate HFA (PROVENTIL;VENTOLIN;PROAIR) 108 (90 Base) MCG/ACT inhaler 7/21/2025 Morning Self   Sig: Inhale 1 puff into the lungs every 6 hours as needed for Shortness of Breath or Wheezing   amLODIPine (NORVASC) 10 MG tablet 7/21/2025 Morning Self   Sig: TAKE 1 TABLET EVERY DAY FOR BLOOD PRESSURE   ascorbic acid (VITAMIN C) 500 MG tablet 7/21/2025 Morning Self   Sig: Take 1 tablet by mouth daily   aspirin 325 MG tablet 7/21/2025 Morning Self   Sig: Take 1 tablet by mouth daily   b complex vitamins capsule 7/21/2025 Morning Self   Sig: Take 1 capsule by mouth daily   folic acid (FOLVITE) 400 MCG tablet 7/21/2025 Self   Sig: Take 1 tablet by mouth daily   hydroCHLOROthiazide (HYDRODIURIL) 12.5 MG tablet 7/21/2025 Self   Sig: TAKE 1 TABLET IN THE MORNING ORALLY ONCE A DAY FOR BLOOD PRESSURE   loratadine (CLARITIN) 10 MG tablet 7/21/2025 Self   Sig: Take 1 tablet by mouth daily   nicotine (NICODERM CQ) 21 MG/24HR 7/21/2025 Self   Sig: Place 1 patch onto the skin every 24 hours   rosuvastatin (CRESTOR) 20 MG tablet 7/21/2025 Self   Sig: TAKE 1 TABLET EVERY DAY TO

## 2025-07-21 NOTE — ED PROVIDER NOTES
[facility] EMERGENCY DEPARTMENT  EMERGENCY DEPARTMENT ENCOUNTER       Pt Name: Ranulfo Hernandez  MRN: [id number]  Birthdate 1955  Date of evaluation: 7/21/2025  Provider: Alessia Bruce MD   PCP: Unknown, Provider  Note Started: 9:12 AM EDT 7/21/25     CHIEF COMPLAINT       Chief Complaint   Patient presents with    Shortness of Breath     Patient wheeled to triage c/o shortness of breath that has been worsening over the weekend. Patient reports that he went on a boat ride last week and symptoms have been progressively getting worse since then. Patient has a history of COPD and reports using rescue inhaler with no relief.         HISTORY OF PRESENT ILLNESS: 1 or more elements      History From: patient, History limited by: none     Ranulfo Hernandez is a 70 y.o. male who presents with a chief complaint of shortness of breath progressively worsening over the last 4 days       Please See MDM for Additional Details of the HPI/PMH  Nursing Notes were all reviewed and agreed with or any disagreements were addressed in the HPI.     REVIEW OF SYSTEMS        Positives and Pertinent negatives as per HPI.    PAST HISTORY     Past Medical History:  Past Medical History:   Diagnosis Date    Cancer (HCC)     PROSTATE    Diverticulitis     Hyperthyroidism 5/15/2019    Psychiatric disorder     ANXIETY (SITUATIONAL)       Past Surgical History:  Past Surgical History:   Procedure Laterality Date    GI  2003    18 \" COLON RESECTION (DIVERTICULITIS)    [place] UNLISTED PROCEDURE ABDOMEN PERITONEUM & OMENTUM      UMBILICAL HERNIA    TOE AMPUTATION Right 3/27/2024    RIGHT FOOT GREAT TOE INCISION AND DRAINAGE WITH BONE RESECTION (MAC W/LOCAL) performed by Juan Pablo Antoine DPM at [facility]"" MAIN OR    TOE AMPUTATION Right 9/28/2024    INCISION AND DRAINAGE WITH AMPUTATION GREAT TOE RIGHT FOOT performed by Juan Pablo Antoine DPM at [facility] MAIN OR    UROLOGICAL SURGERY      CYSTO/BIOPSY PROSTATE       Family History:  Family History

## 2025-07-21 NOTE — H&P
Hospitalist Admission Note    NAME:   Ranulfo Hernandez   : 1955   MRN: [id number]     Date/Time: 2025 12:13 PM    Patient PCP: Unknown, Provider    ______________________________________________________________________  Given the patient's current clinical presentation, I have a high level of concern for decompensation if discharged from the emergency department.  Complex decision making was performed, which includes reviewing the patient's available past medical records, laboratory results, and x-ray films.       My assessment of this patient's clinical condition and my plan of care is as follows.    Assessment / Plan:    COPD exacerbation  A-fib with RVR  -Presents with chest pain, chest palpitation shortness of breath for 4 days, increased use of rescue inhaler, 20 times  - CXR negative for acute pathology  - CBC negative for leukocytosis  - proBNP 412  - Troponin 23  - CMP shows hypokalemia  - EKG shows A-fib with RVR  - Was started on diltiazem in the ED  - Admit to medicine stepdown  -Will check respiratory cultures, flu/COVID  - Continue diltiazem  - Start azithromycin, steroids  - Pulm hygiene  - Pulm consult to optimize patient regimen       A-fib with RVR  - Not on rate controlling meds, not on anticoagulation  -Likely from COPD exacerbation  - Continue on diltiazem, therapeutic Lovenox  - Cardiology consult, echo    Alcohol use disorder  Alcohol withdrawal syndrome  - Patient reports drinking several drinks daily  - CIWA protocol ordered, thiamine, multivitamin    Increased risk for hospital delirium  - Delirium precautions ordered  - Melatonin nightly, thiamine daily     High risk for aspiration  -    , aspiration precautions ordered    High risk for falls  - PT OT, fall precautions ordered    Hx prostate cancer s/p prostatectomy  Hyperthyroidism  HLD  HTN right first toe osteomyelitis  -C/W home medications    Medical Decision Making:   I personally reviewed labs: CBC CMP troponin

## 2025-07-21 NOTE — PROGRESS NOTES
Consult seen, full note to follow.  Paroxysmal atrial fibrillation.  Back in sinus rhythm.  With his history of alcohol excess, falls, I suspect this is why he has not been on chronic anticoagulation--but he is unsure of that.  He has had a cardioversion in the past for atrial fibrillation.  I'd keep him on Lovenox for now until he commits to oral anticoagulation.  Get him to oral diltiazem tomorrow.  Agree with treating for COPD exacerbation.  Definitely want to see what the echo shows.    Romeo Cheng MD

## 2025-07-21 NOTE — CONSULTS
Or    potassium chloride 10 mEq/100 mL IVPB (Peripheral Line)  10 mEq IntraVENous PRN    magnesium sulfate 2000 mg in 50 mL IVPB premix  2,000 mg IntraVENous PRN    ondansetron (ZOFRAN-ODT) disintegrating tablet 4 mg  4 mg Oral Q8H PRN    Or    ondansetron (ZOFRAN) injection 4 mg  4 mg IntraVENous Q6H PRN    polyethylene glycol (GLYCOLAX) packet 17 g  17 g Oral Daily PRN    acetaminophen (TYLENOL) tablet 650 mg  650 mg Oral Q6H PRN    Or    acetaminophen (TYLENOL) suppository 650 mg  650 mg Rectal Q6H PRN    enoxaparin (LOVENOX) injection 100 mg  1 mg/kg SubCUTAneous BID    sodium chloride 0.9 % bolus 1,000 mL  1,000 mL IntraVENous Once    azithromycin (ZITHROMAX) 500 mg in sodium chloride 0.9 % 250 mL IVPB (Bygr2Gtl)  500 mg IntraVENous Q24H    [START ON 7/22/2025] methylPREDNISolone sodium succ (SOLU-MEDROL) 40 mg in sterile water 1 mL injection  40 mg IntraVENous Q8H    levalbuterol (XOPENEX) nebulizer solution 1.25 mg  1.25 mg Nebulization Q8H PRN    guaiFENesin (ROBITUSSIN) 100 MG/5ML liquid 400 mg  400 mg Oral 6x Daily    potassium chloride (KLOR-CON) 20 MEQ packet 40 mEq  40 mEq Oral Once    sodium chloride flush 0.9 % injection 5-40 mL  5-40 mL IntraVENous 2 times per day    sodium chloride flush 0.9 % injection 5-40 mL  5-40 mL IntraVENous PRN    0.9 % sodium chloride infusion   IntraVENous PRN    thiamine mononitrate tablet 100 mg  100 mg Oral Daily    LORazepam (ATIVAN) tablet 1 mg  1 mg Oral Q1H PRN    Or    diazePAM (VALIUM) injection 5 mg  5 mg IntraVENous Q4H PRN    Or    LORazepam (ATIVAN) tablet 2 mg  2 mg Oral Q1H PRN    Or    diazePAM (VALIUM) injection 5 mg  5 mg IntraVENous Q4H PRN    Or    LORazepam (ATIVAN) tablet 3 mg  3 mg Oral Q1H PRN    Or    diazePAM (VALIUM) injection 5 mg  5 mg IntraVENous Q4H PRN    Or    LORazepam (ATIVAN) tablet 4 mg  4 mg Oral Q1H PRN    Or    diazePAM (VALIUM) injection 5 mg  5 mg IntraVENous Q4H PRN       Labs:  ABG Invalid input(s): \"RAUL\"     CBC Recent Labs      07/21/25  0918   WBC 10.5   HGB 17.3*   HCT 51.8*      MCV 97.7   MCH 32.6        Metabolic  Panel Recent Labs     07/21/25  0918      K 3.4*      CO2 28   BUN 12   MG 2.2   PHOS 3.8   ALT 32        Pertinent Labs                Taya Castorena, APRN - CNP  7/21/2025

## 2025-07-21 NOTE — PROGRESS NOTES
4 Eyes Skin Assessment     NAME:  Ranulfo Hernandez  YOB: 1955  MEDICAL RECORD NUMBER:  [id number]    The patient is being assessed for  Transfer to New Unit    I agree that at least one RN has performed a thorough Head to Toe Skin Assessment on the patient. ALL assessment sites listed below have been assessed.      Areas assessed by both nurses:    Head, Face, Ears, Shoulders, Back, Chest, Arms, Elbows, Hands, Sacrum. Buttock, Coccyx, Ischium, Legs. Feet and Heels, and Under Medical Devices         Does the Patient have a Wound? No noted wound(s)       Dioni Prevention initiated by RN: No  Wound Care Orders initiated by RN: No    For hospital-acquired stage 1 & 2 and ALL Stage 3,4, Unstageable, DTI, NWPT, and Complex wounds: place order “IP Wound Care/Ostomy Nurse Eval and Treat” by RN under : No    New Ostomies, if present place, Ostomy referral order under : No     Nurse 1 eSignature: Electronically signed by Radha Perera RN on 7/21/25 at 6:58 PM EDT    **SHARE this note so that the co-signing nurse can place an eSignature**    Nurse 2 eSignature: Electronically signed by CHIP VALDEZ RN on 7/21/25 at 6:59 PM EDT

## 2025-07-21 NOTE — PROGRESS NOTES
End of Shift Note    Bedside shift change report given to Tomasa (oncoming nurse) by Radha Perera RN (offgoing nurse).  Report included the following information SBAR    Shift worked:  7A-7P     Shift summary and any significant changes:     Patient converted back to Normal Sinus rhythm. Dilt GTT at 5 ml . Last CIWA is 2. Plan is to Wean Dilt GTT and ECHO. Sputum culture sent and Urine culture still needed.     Concerns for physician to address:         Zone phone for oncoming shift:            Activity:     Number times ambulated in hallways past shift: 0  Number of times OOB to chair past shift: 0    Cardiac:   Cardiac Monitoring: Yes      Cardiac Rhythm: Sinus rhythm    Access:  Current line(s): PIV     Genitourinary:   Urinary Status: Voiding    Respiratory:   O2 Device: None (Room air)  Chronic home O2 use?: NO  Incentive spirometer at bedside: YES    GI:  Last BM (including prior to admit): 07/21/25  Current diet:  ADULT DIET; Regular  Passing flatus: YES    Pain Management:   Patient states pain is manageable on current regimen: YES    Skin:  Dioni Scale Score: 20  Interventions: Wound Offloading (Prevention Methods): Turning, Repositioning    Patient Safety:  Fall Risk: Nursing Judgement-Fall Risk High(Add Comments): Yes  Fall Risk Interventions  Nursing Judgement-Fall Risk High(Add Comments): Yes  Toilet Every 2 Hours-In Advance of Need: Yes  Hourly Visual Checks: Awake, In bed  Fall Visual Posted: Armband, Socks  Room Door Open: Yes  Alarm On: Bed  Patient Moved Closer to Nursing Station: No    Active Consults:   IP CONSULT TO CARDIOLOGY  IP CONSULT TO PULMONOLOGY  IP CONSULT TO SOCIAL WORK    Length of Stay:  Expected LOS: 3  Actual LOS: 0    Radha Perera RN

## 2025-07-21 NOTE — ED NOTES
TRANSFER - OUT REPORT:    Verbal report given to Aayna NARANJO on Ranulfo Hernandez  being transferred to 2319 for routine progression of patient care       Report consisted of patient's Situation, Background, Assessment and   Recommendations(SBAR).     Information from the following report(s) ED SBAR was reviewed with the receiving nurse.    [facility] Fall Assessment:    Presents to emergency department  because of falls (Syncope, seizure, or loss of consciousness): No  Age > 70: Yes  Altered Mental Status, Intoxication with alcohol or substance confusion (Disorientation, impaired judgment, poor safety awaremess, or inability to follow instructions): No  Impaired Mobility: Ambulates or transfers with assistive devices or assistance; Unable to ambulate or transer.: No  Nursing Judgement: No          Lines:   Peripheral IV 07/21/25 Right Arm (Active)        Opportunity for questions and clarification was provided.      Patient transported with:  Registered Nurse PCU Nurse to come down

## 2025-07-22 ENCOUNTER — TELEPHONE (OUTPATIENT)
Facility: CLINIC | Age: 70
End: 2025-07-22

## 2025-07-22 LAB
ANION GAP SERPL CALC-SCNC: 6 MMOL/L (ref 2–12)
BASOPHILS # BLD: 0.01 K/UL (ref 0–0.1)
BASOPHILS NFR BLD: 0.1 % (ref 0–1)
BUN SERPL-MCNC: 13 MG/DL (ref 6–20)
BUN/CREAT SERPL: 17 (ref 12–20)
CALCIUM SERPL-MCNC: 9 MG/DL (ref 8.5–10.1)
CHLORIDE SERPL-SCNC: 110 MMOL/L (ref 97–108)
CO2 SERPL-SCNC: 25 MMOL/L (ref 21–32)
COMMENT:: NORMAL
CREAT SERPL-MCNC: 0.75 MG/DL (ref 0.7–1.3)
DIFFERENTIAL METHOD BLD: ABNORMAL
EOSINOPHIL # BLD: 0 K/UL (ref 0–0.4)
EOSINOPHIL NFR BLD: 0 % (ref 0–7)
ERYTHROCYTE [DISTWIDTH] IN BLOOD BY AUTOMATED COUNT: 13.9 % (ref 11.5–14.5)
GLUCOSE SERPL-MCNC: 147 MG/DL (ref 65–100)
HCT VFR BLD AUTO: 46.5 % (ref 36.6–50.3)
HGB BLD-MCNC: 15.6 G/DL (ref 12.1–17)
IMM GRANULOCYTES # BLD AUTO: 0.11 K/UL (ref 0–0.04)
IMM GRANULOCYTES NFR BLD AUTO: 1.1 % (ref 0–0.5)
INR PPP: 1.1 (ref 0.9–1.1)
LYMPHOCYTES # BLD: 0.78 K/UL (ref 0.8–3.5)
LYMPHOCYTES NFR BLD: 7.7 % (ref 12–49)
MCH RBC QN AUTO: 33.1 PG (ref 26–34)
MCHC RBC AUTO-ENTMCNC: 33.5 G/DL (ref 30–36.5)
MCV RBC AUTO: 98.7 FL (ref 80–99)
MONOCYTES # BLD: 0.22 K/UL (ref 0–1)
MONOCYTES NFR BLD: 2.2 % (ref 5–13)
NEUTS SEG # BLD: 8.98 K/UL (ref 1.8–8)
NEUTS SEG NFR BLD: 88.9 % (ref 32–75)
NRBC # BLD: 0 K/UL (ref 0–0.01)
NRBC BLD-RTO: 0 PER 100 WBC
PLATELET # BLD AUTO: 189 K/UL (ref 150–400)
PMV BLD AUTO: 10 FL (ref 8.9–12.9)
POTASSIUM SERPL-SCNC: 4 MMOL/L (ref 3.5–5.1)
PROTHROMBIN TIME: 11.1 SEC (ref 9.2–11.2)
RBC # BLD AUTO: 4.71 M/UL (ref 4.1–5.7)
RBC MORPH BLD: ABNORMAL
SODIUM SERPL-SCNC: 141 MMOL/L (ref 136–145)
SPECIMEN HOLD: NORMAL
WBC # BLD AUTO: 10.1 K/UL (ref 4.1–11.1)

## 2025-07-22 PROCEDURE — 36415 COLL VENOUS BLD VENIPUNCTURE: CPT

## 2025-07-22 PROCEDURE — 80048 BASIC METABOLIC PNL TOTAL CA: CPT

## 2025-07-22 PROCEDURE — 6370000000 HC RX 637 (ALT 250 FOR IP): Performed by: INTERNAL MEDICINE

## 2025-07-22 PROCEDURE — 2500000003 HC RX 250 WO HCPCS: Performed by: STUDENT IN AN ORGANIZED HEALTH CARE EDUCATION/TRAINING PROGRAM

## 2025-07-22 PROCEDURE — 85025 COMPLETE CBC W/AUTO DIFF WBC: CPT

## 2025-07-22 PROCEDURE — 2060000000 HC ICU INTERMEDIATE R&B

## 2025-07-22 PROCEDURE — 6370000000 HC RX 637 (ALT 250 FOR IP): Performed by: STUDENT IN AN ORGANIZED HEALTH CARE EDUCATION/TRAINING PROGRAM

## 2025-07-22 PROCEDURE — 94640 AIRWAY INHALATION TREATMENT: CPT

## 2025-07-22 PROCEDURE — 85610 PROTHROMBIN TIME: CPT

## 2025-07-22 PROCEDURE — 6360000002 HC RX W HCPCS: Performed by: STUDENT IN AN ORGANIZED HEALTH CARE EDUCATION/TRAINING PROGRAM

## 2025-07-22 PROCEDURE — 6360000002 HC RX W HCPCS

## 2025-07-22 PROCEDURE — 2580000003 HC RX 258: Performed by: STUDENT IN AN ORGANIZED HEALTH CARE EDUCATION/TRAINING PROGRAM

## 2025-07-22 PROCEDURE — 2700000000 HC OXYGEN THERAPY PER DAY

## 2025-07-22 RX ORDER — ALBUTEROL SULFATE 0.83 MG/ML
2.5 SOLUTION RESPIRATORY (INHALATION) EVERY 6 HOURS PRN
Status: DISCONTINUED | OUTPATIENT
Start: 2025-07-22 | End: 2025-07-24

## 2025-07-22 RX ORDER — ZINC SULFATE 50(220)MG
50 CAPSULE ORAL DAILY
Status: DISCONTINUED | OUTPATIENT
Start: 2025-07-22 | End: 2025-07-26 | Stop reason: HOSPADM

## 2025-07-22 RX ORDER — HYDROCHLOROTHIAZIDE 25 MG/1
25 TABLET ORAL DAILY
Status: DISCONTINUED | OUTPATIENT
Start: 2025-07-22 | End: 2025-07-26 | Stop reason: HOSPADM

## 2025-07-22 RX ORDER — OMEGA-3-ACID ETHYL ESTERS 1 G/1
1000 CAPSULE, LIQUID FILLED ORAL DAILY
Status: DISCONTINUED | OUTPATIENT
Start: 2025-07-22 | End: 2025-07-26 | Stop reason: HOSPADM

## 2025-07-22 RX ORDER — NICOTINE 21 MG/24HR
1 PATCH, TRANSDERMAL 24 HOURS TRANSDERMAL EVERY 24 HOURS
Status: DISCONTINUED | OUTPATIENT
Start: 2025-07-22 | End: 2025-07-26 | Stop reason: HOSPADM

## 2025-07-22 RX ORDER — ASPIRIN 325 MG
325 TABLET ORAL DAILY
Status: DISCONTINUED | OUTPATIENT
Start: 2025-07-22 | End: 2025-07-26 | Stop reason: HOSPADM

## 2025-07-22 RX ORDER — ASCORBIC ACID 500 MG
500 TABLET ORAL DAILY
Status: DISCONTINUED | OUTPATIENT
Start: 2025-07-22 | End: 2025-07-26 | Stop reason: HOSPADM

## 2025-07-22 RX ORDER — ALBUTEROL SULFATE 90 UG/1
1 INHALANT RESPIRATORY (INHALATION) EVERY 6 HOURS PRN
Status: DISCONTINUED | OUTPATIENT
Start: 2025-07-22 | End: 2025-07-22

## 2025-07-22 RX ORDER — DILTIAZEM HCL 60 MG
60 TABLET ORAL EVERY 8 HOURS SCHEDULED
Status: DISCONTINUED | OUTPATIENT
Start: 2025-07-22 | End: 2025-07-26 | Stop reason: HOSPADM

## 2025-07-22 RX ORDER — VITAMIN B COMPLEX
1000 TABLET ORAL DAILY
Status: DISCONTINUED | OUTPATIENT
Start: 2025-07-22 | End: 2025-07-26 | Stop reason: HOSPADM

## 2025-07-22 RX ORDER — FOLIC ACID 1 MG/1
1000 TABLET ORAL DAILY
Status: DISCONTINUED | OUTPATIENT
Start: 2025-07-22 | End: 2025-07-26 | Stop reason: HOSPADM

## 2025-07-22 RX ORDER — ROSUVASTATIN CALCIUM 10 MG/1
10 TABLET, COATED ORAL NIGHTLY
Status: DISCONTINUED | OUTPATIENT
Start: 2025-07-22 | End: 2025-07-26 | Stop reason: HOSPADM

## 2025-07-22 RX ORDER — AMLODIPINE BESYLATE 5 MG/1
5 TABLET ORAL DAILY
Status: DISCONTINUED | OUTPATIENT
Start: 2025-07-22 | End: 2025-07-22

## 2025-07-22 RX ADMIN — SODIUM CHLORIDE, PRESERVATIVE FREE 10 ML: 5 INJECTION INTRAVENOUS at 09:27

## 2025-07-22 RX ADMIN — HYDROCHLOROTHIAZIDE 25 MG: 25 TABLET ORAL at 15:42

## 2025-07-22 RX ADMIN — DILTIAZEM HYDROCHLORIDE 60 MG: 60 TABLET, FILM COATED ORAL at 15:42

## 2025-07-22 RX ADMIN — Medication 1000 UNITS: at 15:43

## 2025-07-22 RX ADMIN — SODIUM CHLORIDE, PRESERVATIVE FREE 10 ML: 5 INJECTION INTRAVENOUS at 09:28

## 2025-07-22 RX ADMIN — FOLIC ACID 1000 MCG: 1 TABLET ORAL at 15:42

## 2025-07-22 RX ADMIN — SODIUM CHLORIDE, PRESERVATIVE FREE 10 ML: 5 INJECTION INTRAVENOUS at 22:05

## 2025-07-22 RX ADMIN — APIXABAN 5 MG: 5 TABLET, FILM COATED ORAL at 22:03

## 2025-07-22 RX ADMIN — METHYLPREDNISOLONE SODIUM SUCCINATE 40 MG: 40 INJECTION, POWDER, LYOPHILIZED, FOR SOLUTION INTRAMUSCULAR; INTRAVENOUS at 15:43

## 2025-07-22 RX ADMIN — IPRATROPIUM BROMIDE 0.5 MG: 0.5 SOLUTION RESPIRATORY (INHALATION) at 19:44

## 2025-07-22 RX ADMIN — IPRATROPIUM BROMIDE 0.5 MG: 0.5 SOLUTION RESPIRATORY (INHALATION) at 16:31

## 2025-07-22 RX ADMIN — ROSUVASTATIN CALCIUM 10 MG: 10 TABLET, FILM COATED ORAL at 22:03

## 2025-07-22 RX ADMIN — ARFORMOTEROL TARTRATE: 15 SOLUTION RESPIRATORY (INHALATION) at 07:23

## 2025-07-22 RX ADMIN — Medication 100 MG: at 09:27

## 2025-07-22 RX ADMIN — ZINC SULFATE 220 MG (50 MG) CAPSULE 50 MG: CAPSULE at 15:42

## 2025-07-22 RX ADMIN — METHYLPREDNISOLONE SODIUM SUCCINATE 40 MG: 40 INJECTION, POWDER, LYOPHILIZED, FOR SOLUTION INTRAMUSCULAR; INTRAVENOUS at 09:27

## 2025-07-22 RX ADMIN — IPRATROPIUM BROMIDE 0.5 MG: 0.5 SOLUTION RESPIRATORY (INHALATION) at 07:07

## 2025-07-22 RX ADMIN — OXYCODONE HYDROCHLORIDE AND ACETAMINOPHEN 500 MG: 500 TABLET ORAL at 15:42

## 2025-07-22 RX ADMIN — ARFORMOTEROL TARTRATE: 15 SOLUTION RESPIRATORY (INHALATION) at 19:44

## 2025-07-22 RX ADMIN — DILTIAZEM HYDROCHLORIDE 60 MG: 60 TABLET, FILM COATED ORAL at 22:03

## 2025-07-22 RX ADMIN — GUAIFENESIN 400 MG: 200 SOLUTION ORAL at 09:26

## 2025-07-22 RX ADMIN — GUAIFENESIN 400 MG: 200 SOLUTION ORAL at 11:36

## 2025-07-22 RX ADMIN — OMEGA-3-ACID ETHYL ESTERS CAPSULES 1000 MG: 1 CAPSULE, LIQUID FILLED ORAL at 15:42

## 2025-07-22 RX ADMIN — ENOXAPARIN SODIUM 100 MG: 100 INJECTION SUBCUTANEOUS at 09:26

## 2025-07-22 RX ADMIN — ASPIRIN 325 MG: 325 TABLET ORAL at 15:43

## 2025-07-22 RX ADMIN — IPRATROPIUM BROMIDE 0.5 MG: 0.5 SOLUTION RESPIRATORY (INHALATION) at 12:00

## 2025-07-22 RX ADMIN — AZITHROMYCIN MONOHYDRATE 500 MG: 500 INJECTION, POWDER, LYOPHILIZED, FOR SOLUTION INTRAVENOUS at 11:38

## 2025-07-22 NOTE — CARE COORDINATION
07/22/25 1021   Avoidable Days   Organizational/Internal/System        Cardiac Results  (echo)

## 2025-07-22 NOTE — PLAN OF CARE
Problem: Discharge Planning  Goal: Discharge to home or other facility with appropriate resources  Recent Flowsheet Documentation  Taken 7/22/2025 0924 by Radha Perera RN  Discharge to home or other facility with appropriate resources:   Identify barriers to discharge with patient and caregiver   Arrange for needed discharge resources and transportation as appropriate   Identify discharge learning needs (meds, wound care, etc)   Refer to discharge planning if patient needs post-hospital services based on physician order or complex needs related to functional status, cognitive ability or social support system     Problem: Neurosensory - Adult  Goal: Achieves stable or improved neurological status  Outcome: Progressing  Flowsheets (Taken 7/22/2025 0924)  Achieves stable or improved neurological status:   Initiate measures to prevent increased intracranial pressure   Assess for and report changes in neurological status   Maintain blood pressure and fluid volume within ordered parameters to optimize cerebral perfusion and minimize risk of hemorrhage   Monitor temperature, glucose, and sodium. Initiate appropriate interventions as ordered  Goal: Absence of seizures  Outcome: Progressing  Flowsheets (Taken 7/22/2025 0924)  Absence of seizures:   Monitor for seizure activity.  If seizure occurs, document type and location of movements and any associated apnea   If seizure occurs, turn head to side and suction secretions as needed   Administer anticonvulsants as ordered   Diagnostic studies as ordered   Support airway/breathing, administer oxygen as needed  Goal: Achieves maximal functionality and self care  Outcome: Progressing  Flowsheets (Taken 7/22/2025 0924)  Achieves maximal functionality and self care:   Monitor swallowing and airway patency with patient fatigue and changes in neurological status   Encourage and assist patient to increase activity and self care with guidance from physical therapy/occupational

## 2025-07-22 NOTE — CARE COORDINATION
Care Management Initial Assessment       RUR: 11% Low RUR  Readmission? No  1st IM letter given? Yes - 7/21  1st  letter given: No     07/22/25 0842   Service Assessment   Patient Orientation Person;Alert and Oriented;Place;Situation;Self   Cognition Alert   History Provided By Patient   Primary Caregiver Self   Accompanied By/Relationship no one   Support Systems Family Members;[facility]  (Ayana Phillips (Child)  [phone])   Patient's Healthcare Decision Maker is: Legal Next of Kin   PCP Verified by CM Yes   Last Visit to PCP Within last 3 months   Prior Functional Level Independent in ADLs/IADLs   Current Functional Level Independent in ADLs/IADLs   Can patient return to prior living arrangement Yes   Ability to make needs known: Good   Family able to assist with home care needs: Yes   Would you like for me to discuss the discharge plan with any other family members/significant others, and if so, who? Yes   Financial Resources Medicare   Community Resources None   CM/SW Referral Disease Management Education   Social/Functional History   Lives With Alone   Type of Home House   Home Layout Two level   Home Access Stairs to enter with rails   Entrance Stairs - Number of Steps 13   Home Equipment Cane   Active  Yes   Discharge Planning   Type of Residence Other (Comment)  (office building)   Living Arrangements Alone   Current Services Prior To Admission None   Potential Assistance Needed N/A   Patient expects to be discharged to: Other (comment)  (office building)         The  ([facility]) conducted an initial meeting with the patient , formally introducing themselves and clarifying their role in discharge planning and transitional care. Demographic and insurance details were verified for accuracy. Notably, the patient has no prior history with home health, skilled nursing facilities (SNF), or inpatient rehabilitation (IPR). Moreover, the patient is alert, able to communicate  needs
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
## 2025-07-22 NOTE — PROGRESS NOTES
End of Shift Note    Bedside shift change report given to Chantale (oncoming nurse) by Radha Perera RN (offgoing nurse).  Report included the following information SBAR    Shift worked:  7A-7P     Shift summary and any significant changes:     Diltiazem ordered today. , Aspirin will be started tonight Normal sinus rhythm throughout day.  Last CIWA scored a 2. Echo still needed     Concerns for physician to address:         Zone phone for oncoming shift:            Activity:     Number times ambulated in hallways past shift: 0  Number of times OOB to chair past shift: 1    Cardiac:   Cardiac Monitoring: Yes      Cardiac Rhythm: Sinus rhythm    Access:  Current line(s): PIV     Genitourinary:   Urinary Status: Voiding    Respiratory:   O2 Device: Nasal cannula  Chronic home O2 use?: NO  Incentive spirometer at bedside: YES    GI:  Last BM (including prior to admit): 07/21/25  Current diet:  ADULT DIET; Regular  Passing flatus: YES    Pain Management:   Patient states pain is manageable on current regimen: YES    Skin:  Dioni Scale Score: 21  Interventions: Wound Offloading (Prevention Methods): Repositioning, Pillows, Turning    Patient Safety:  Fall Risk: Nursing Judgement-Fall Risk High(Add Comments): Yes  Fall Risk Interventions  Nursing Judgement-Fall Risk High(Add Comments): Yes  Toilet Every 2 Hours-In Advance of Need: Yes  Hourly Visual Checks: Awake  Fall Visual Posted: Socks, Armband  Room Door Open: Yes  Alarm On: Bed  Patient Moved Closer to Nursing Station: No    Active Consults:   IP CONSULT TO CARDIOLOGY  IP CONSULT TO PULMONOLOGY  IP CONSULT TO SOCIAL WORK    Length of Stay:  Expected LOS: 3  Actual LOS: 1    Radha Perera RN

## 2025-07-22 NOTE — PROGRESS NOTES
Bedside and Verbal shift change report given to Tomasa RN (oncoming nurse) by Radha RN (offgoing nurse). Report included the following information Nurse Handoff Report.       End of Shift Note    Bedside shift change report given to Radha RN (oncoming nurse) by Tomasa De La Cruz RN (offgoing nurse).  Report included the following information SBAR    Shift worked:  7p-7a     Shift summary and any significant changes:    Pt on 1L o2.stating \"I like the way it taste\"  Ciwa score low      Concerns for physician to address:  See above     Zone phone for oncoming shift:          Activity:     Number times ambulated in hallways past shift: 0  Number of times OOB to chair past shift: 2    Cardiac:   Cardiac Monitoring: Yes      Cardiac Rhythm: Sinus rhythm    Access:  Current line(s): PIV     Genitourinary:   Urinary Status: Voiding    Respiratory:   O2 Device: None (Room air)  Chronic home O2 use?: NO  Incentive spirometer at bedside: YES    GI:  Last BM (including prior to admit): 07/20/25  Current diet:  ADULT DIET; Regular  Passing flatus: YES    Pain Management:   Patient states pain is manageable on current regimen: YES    Skin:  Dioni Scale Score: 20  Interventions: Wound Offloading (Prevention Methods): Pillows, Repositioning    Patient Safety:  Fall Risk: Nursing Judgement-Fall Risk High(Add Comments): Yes  Fall Risk Interventions  Nursing Judgement-Fall Risk High(Add Comments): Yes  Toilet Every 2 Hours-In Advance of Need: Yes  Hourly Visual Checks: Awake, In bed  Fall Visual Posted: Armband, Socks  Room Door Open: Yes  Alarm On: Bed  Patient Moved Closer to Nursing Station: No    Active Consults:   IP CONSULT TO CARDIOLOGY  IP CONSULT TO PULMONOLOGY  IP CONSULT TO SOCIAL WORK    Length of Stay:  Expected LOS: 3  Actual LOS: 0    Tomasa De La Cruz RN

## 2025-07-22 NOTE — PROGRESS NOTES
Hospitalist Progress Note    NAME:   Ranulfo Hernandez   : 1955   MRN: [id number]     Date/Time: 2025 3:14 PM  Patient PCP: Kim Trinh MD    Estimated discharge date:  Barriers:       Assessment / Plan:    Acute COPD exacerbation  - Chest x-ray shows no acute process.  proBNP is 400.  - Chest x-ray shows no acute process  - Continue Solu-Medrol, Zithromax, DuoNeb  -Currently on 1 L nasal cannula and is being weaned off    Atrial fibrillation with rapid ventricular rate  - Status post Cardizem drip.  Continue therapeutic Lovenox.  Cardiology following and appreciate recommendations.  Consider starting on oral Cardizem per cardiology recommendations.  Currently rate is controlled.  - Pending 2D echocardiogram    Chronic alcoholism with concern for alcohol withdrawal  - Continue thiamine and folic acid.  Continue CIWA protocol with Ativan    Hx prostate cancer s/p prostatectomy  Hyperthyroidism  HLD  HTN  - TSH is currently normal  - Continue Norvasc, statin      Medical Decision Making:   I personally reviewed labs: CBC, BMP  I personally reviewed imaging: Chest x-ray  I personally reviewed EKG: Telemetry  Toxic drug monitoring: Monitor blood sugar while on Solu-Medrol  Discussed case with: Pharmacy        Code Status: Full code  DVT Prophylaxis: Lovenox  GI Prophylaxis:    Subjective:     Chief Complaint / Reason for Physician Visit  Shortness of breath slightly better, mild cough, no phlegm.  No chest pain  Reports some anxiety this morning but improving  Overnight events noted        Objective:     VITALS:   Last 24hrs VS reviewed since prior progress note. Most recent are:  Patient Vitals for the past 24 hrs:   BP Temp Temp src Pulse Resp SpO2   25 1500 -- -- -- 73 20 94 %   25 1411 -- -- -- 90 14 93 %   25 1410 139/75 98.8 °F (37.1 °C) Oral (!) 103 25 95 %   25 1409 -- -- -- 95 17 92 %   25 1332 -- -- -- 92 30 92 %   25 1300 (!) 131/55 -- -- 74 17 90 %  below:  PHYSICAL EXAM:  General: Alert, cooperative  EENT:  EOMI. Anicteric sclerae.  Resp:  Bilateral air entry present, wheezing present, no crackles  CV:  Regular  rhythm,  No edema  GI:  Soft, Non distended, Non tender.  +Bowel sounds  Neurologic:  Alert and oriented X 3, normal speech,   Psych:   Good insight. Not anxious nor agitated  Skin:  No rashes.  No jaundice    Reviewed most current lab test results and cultures  YES  Reviewed most current radiology test results   YES  Review and summation of old records today    NO  Reviewed patient's current orders and MAR    YES  PMH/SH reviewed - no change compared to H&P    Procedures: see electronic medical records for all procedures/Xrays and details which were not copied into this note but were reviewed prior to creation of Plan.      LABS:  I reviewed today's most current labs and imaging studies.  Pertinent labs include:  Recent Labs     07/21/25  0918 07/22/25  0352   WBC 10.5 10.1   HGB 17.3* 15.6   HCT 51.8* 46.5    189     Recent Labs     07/21/25  0918 07/22/25  0352    141   K 3.4* 4.0    110*   CO2 28 25   GLUCOSE 146* 147*   BUN 12 13   CREATININE 0.89 0.75   CALCIUM 9.8 9.0   MG 2.2  --    PHOS 3.8  --    BILITOT 0.7  --    AST 20  --    ALT 32  --    INR  --  1.1       Signed: Ish Cardona MD
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
## 2025-07-22 NOTE — PROGRESS NOTES
Pulmonary, Critical Care, and Sleep Medicine~Consult Note    Name: Ranulfo Hernandez MRN: [id number]   : 1955 Hospital: [facility]   Date: 2025 10:32 AM Admission: 2025     Impression Plan   Suspected COPD, + acute exacerbation  Afib RVR, s/p cardioversion in 2019, not currently on anticoagulation  Alcohol Use Disorder  Hx Prostate Cancer s/p prostatectomy  Hyperthyroidism  HTN  Osteomyelitis  Tobacco Use Disorder  ETOH use   Recurrent Falls  Peripheral Eosinophilia  O2 for sats >88%  Bronchodilators, recommend dx with Trelegy 100 or Breztri, previously on Anoro   Levalbuterol PRN  Azithro for COPD exacerbation  Follow up sputum Cx, atypicals  Procal - negative  IVCS   Dilt gtt, likely switching to PO soon  TTE pending  Pulmonary Toilet  Smoking Cessation - wants to continue Nicotine patches  Lovenox dvt ppx  Fall Precautions  CIWA  Would qualify for LDCT on discharge      Recommend follow up in the OP setting for PFTs and further evaluation and management of pulmonary disease.        Daily Progression:    : Reports feeling significantly better since admission, wheeze is improving. Has not been out of bed yet but plans to today. On room air currently.     CONSULT NOTE:   Consulted by hospitalist service for \"COPD exacerbation, not on appropriate meds.\" Initially presented to the ED today for shortness of breath and chest pain.  Started experiencing shortness of breath and chest tightness over the past couple days.  Albuterol MDI use greater than 20 times per day with no relief.  Denies any sick contacts.  Did note diarrhea, chills, nausea possibly related to food poisoning from fishing trip.  Afebrile. AFIB RVR. Labs significant for sodium 139, potassium 3.4, chloride 106, creatinine 0.69, calcium 9.6, proBNP 412, troponin 23, albumin 3.9, alk phos 171, ALT 32, AST 20, WBC 10.5, hemoglobin 17.3, hematocrit 51.8, platelet 247, eosinophils 7.1%, DIANA 750.  CXR with  Requested Prescriptions     Pending Prescriptions Disp Refills    levothyroxine (Synthroid, Levoxyl) 100 mcg tablet [Pharmacy Med Name: LEVOTHYROXINE 100 MCG TABLET] 94 tablet 1     Sig: SEE ADMINISTRATION INSTRUCTIONS. TAKE 1 TABLET BY MOUTH ONCE DAILY EXCEPT 1 DAY WEEKLY JUST TAKE 1/2 TABLET.

## 2025-07-22 NOTE — TELEPHONE ENCOUNTER
Patient wanted nurse to call and let you know he is at [facility] for copd and Afib.

## 2025-07-22 NOTE — PLAN OF CARE
Problem: Neurosensory - Adult  Goal: Achieves stable or improved neurological status  Outcome: Progressing  Goal: Absence of seizures  Outcome: Progressing  Goal: Achieves maximal functionality and self care  Outcome: Progressing     Problem: Respiratory - Adult  Goal: Achieves optimal ventilation and oxygenation  Outcome: Progressing     Problem: Cardiovascular - Adult  Goal: Maintains optimal cardiac output and hemodynamic stability  Outcome: Progressing  Goal: Absence of cardiac dysrhythmias or at baseline  Outcome: Progressing     Problem: Skin/Tissue Integrity - Adult  Goal: Skin integrity remains intact  Outcome: Progressing     Problem: Musculoskeletal - Adult  Goal: Maintain proper alignment of affected body part  Outcome: Progressing     Problem: Gastrointestinal - Adult  Goal: Minimal or absence of nausea and vomiting  Outcome: Progressing  Goal: Maintains or returns to baseline bowel function  Outcome: Progressing  Goal: Maintains adequate nutritional intake  Outcome: Progressing     Problem: Genitourinary - Adult  Goal: Absence of urinary retention  Outcome: Progressing     Problem: Infection - Adult  Goal: Absence of infection at discharge  Outcome: Progressing  Goal: Absence of infection during hospitalization  Outcome: Progressing     Problem: Metabolic/Fluid and Electrolytes - Adult  Goal: Electrolytes maintained within normal limits  Outcome: Progressing  Goal: Hemodynamic stability and optimal renal function maintained  Outcome: Progressing     Problem: Hematologic - Adult  Goal: Maintains hematologic stability  Outcome: Progressing

## 2025-07-23 ENCOUNTER — APPOINTMENT (OUTPATIENT)
Facility: HOSPITAL | Age: 70
DRG: 191 | End: 2025-07-23
Attending: STUDENT IN AN ORGANIZED HEALTH CARE EDUCATION/TRAINING PROGRAM
Payer: MEDICARE

## 2025-07-23 LAB
ANION GAP SERPL CALC-SCNC: 4 MMOL/L (ref 2–12)
BUN SERPL-MCNC: 17 MG/DL (ref 6–20)
BUN/CREAT SERPL: 23 (ref 12–20)
CALCIUM SERPL-MCNC: 9.1 MG/DL (ref 8.5–10.1)
CHLORIDE SERPL-SCNC: 108 MMOL/L (ref 97–108)
CO2 SERPL-SCNC: 27 MMOL/L (ref 21–32)
CREAT SERPL-MCNC: 0.73 MG/DL (ref 0.7–1.3)
ECHO AO ASC DIAM: 3.5 CM
ECHO AO ASCENDING AORTA INDEX: 1.54 CM/M2
ECHO AO ROOT DIAM: 4 CM
ECHO AO ROOT INDEX: 1.75 CM/M2
ECHO AV AREA PEAK VELOCITY: 2.1 CM2
ECHO AV AREA VTI: 2.7 CM2
ECHO AV AREA/BSA PEAK VELOCITY: 0.9 CM2/M2
ECHO AV AREA/BSA VTI: 1.2 CM2/M2
ECHO AV CUSP MM: 2.2 CM
ECHO AV MEAN GRADIENT: 6 MMHG
ECHO AV MEAN VELOCITY: 1.1 M/S
ECHO AV PEAK GRADIENT: 13 MMHG
ECHO AV PEAK VELOCITY: 1.8 M/S
ECHO AV VELOCITY RATIO: 0.56
ECHO AV VTI: 31.6 CM
ECHO BSA: 2.29 M2
ECHO LA DIAMETER INDEX: 1.54 CM/M2
ECHO LA DIAMETER: 3.5 CM
ECHO LA TO AORTIC ROOT RATIO: 0.88
ECHO LA VOL A-L A2C: 97 ML (ref 18–58)
ECHO LA VOL A-L A4C: 53 ML (ref 18–58)
ECHO LA VOL MOD A2C: 94 ML (ref 18–58)
ECHO LA VOL MOD A4C: 49 ML (ref 18–58)
ECHO LA VOLUME AREA LENGTH: 75 ML
ECHO LA VOLUME INDEX A-L A2C: 43 ML/M2 (ref 16–34)
ECHO LA VOLUME INDEX A-L A4C: 23 ML/M2 (ref 16–34)
ECHO LA VOLUME INDEX AREA LENGTH: 33 ML/M2 (ref 16–34)
ECHO LA VOLUME INDEX MOD A2C: 41 ML/M2 (ref 16–34)
ECHO LA VOLUME INDEX MOD A4C: 21 ML/M2 (ref 16–34)
ECHO LV EF PHYSICIAN: 60 %
ECHO LV FRACTIONAL SHORTENING: 28 % (ref 28–44)
ECHO LV INTERNAL DIMENSION DIASTOLE INDEX: 2.19 CM/M2
ECHO LV INTERNAL DIMENSION DIASTOLIC MMODE: 6.6 CM (ref 4.2–5.9)
ECHO LV INTERNAL DIMENSION DIASTOLIC: 5 CM (ref 4.2–5.9)
ECHO LV INTERNAL DIMENSION SYSTOLIC INDEX: 1.58 CM/M2
ECHO LV INTERNAL DIMENSION SYSTOLIC MMODE: 4.4 CM
ECHO LV INTERNAL DIMENSION SYSTOLIC: 3.6 CM
ECHO LV IVSD MMODE: 0.9 CM (ref 0.6–1)
ECHO LV IVSD: 1.3 CM (ref 0.6–1)
ECHO LV IVSS MMODE: 1.2 CM
ECHO LV MASS 2D: 247.6 G (ref 88–224)
ECHO LV MASS INDEX 2D: 108.6 G/M2 (ref 49–115)
ECHO LV POSTERIOR WALL DIASTOLIC MMODE: 0.8 CM (ref 0.6–1)
ECHO LV POSTERIOR WALL DIASTOLIC: 1.2 CM (ref 0.6–1)
ECHO LV POSTERIOR WALL SYSTOLIC MMODE: 1.3 CM
ECHO LV RELATIVE WALL THICKNESS RATIO: 0.48
ECHO LVOT AREA: 4.2 CM2
ECHO LVOT AV VTI INDEX: 0.67
ECHO LVOT DIAM: 2.3 CM
ECHO LVOT MEAN GRADIENT: 2 MMHG
ECHO LVOT PEAK GRADIENT: 4 MMHG
ECHO LVOT PEAK VELOCITY: 1 M/S
ECHO LVOT STROKE VOLUME INDEX: 38.6 ML/M2
ECHO LVOT SV: 88 ML
ECHO LVOT VTI: 21.2 CM
ECHO MV A VELOCITY: 1.01 M/S
ECHO MV E DECELERATION TIME (DT): 320.8 MS
ECHO MV E VELOCITY: 1.04 M/S
ECHO MV E/A RATIO: 1.03
ECHO MV REGURGITANT PEAK GRADIENT: 29 MMHG
ECHO MV REGURGITANT PEAK VELOCITY: 2.7 M/S
ECHO PV MAX VELOCITY: 1 M/S
ECHO PV PEAK GRADIENT: 4 MMHG
ECHO PVEIN A DURATION: 118 MS
ECHO PVEIN A VELOCITY: 0.4 M/S
ECHO PVEIN PEAK D VELOCITY: 0.5 M/S
ECHO PVEIN PEAK S VELOCITY: 0.4 M/S
ECHO PVEIN S/D RATIO: 0.8
ECHO RV INTERNAL DIMENSION: 4.2 CM
ECHO TV REGURGITANT MAX VELOCITY: 2.35 M/S
ECHO TV REGURGITANT PEAK GRADIENT: 22 MMHG
ERYTHROCYTE [DISTWIDTH] IN BLOOD BY AUTOMATED COUNT: 14.6 % (ref 11.5–14.5)
FLUID CULTURE: NORMAL
GLUCOSE SERPL-MCNC: 131 MG/DL (ref 65–100)
HCT VFR BLD AUTO: 46.1 % (ref 36.6–50.3)
HGB BLD-MCNC: 15.2 G/DL (ref 12.1–17)
Lab: NORMAL
MCH RBC QN AUTO: 32.5 PG (ref 26–34)
MCHC RBC AUTO-ENTMCNC: 33 G/DL (ref 30–36.5)
MCV RBC AUTO: 98.7 FL (ref 80–99)
NRBC # BLD: 0 K/UL (ref 0–0.01)
NRBC BLD-RTO: 0 PER 100 WBC
ORGANISM ID: NORMAL
PLATELET # BLD AUTO: 201 K/UL (ref 150–400)
PMV BLD AUTO: 9.5 FL (ref 8.9–12.9)
POTASSIUM SERPL-SCNC: 4 MMOL/L (ref 3.5–5.1)
RBC # BLD AUTO: 4.67 M/UL (ref 4.1–5.7)
S PNEUM AG SPEC QL LA: NEGATIVE
SODIUM SERPL-SCNC: 139 MMOL/L (ref 136–145)
SPECIMEN SOURCE: NORMAL
SPECIMEN: NORMAL
WBC # BLD AUTO: 17.8 K/UL (ref 4.1–11.1)

## 2025-07-23 PROCEDURE — 2700000000 HC OXYGEN THERAPY PER DAY

## 2025-07-23 PROCEDURE — 2060000000 HC ICU INTERMEDIATE R&B

## 2025-07-23 PROCEDURE — 93306 TTE W/DOPPLER COMPLETE: CPT

## 2025-07-23 PROCEDURE — 94640 AIRWAY INHALATION TREATMENT: CPT

## 2025-07-23 PROCEDURE — 6370000000 HC RX 637 (ALT 250 FOR IP): Performed by: STUDENT IN AN ORGANIZED HEALTH CARE EDUCATION/TRAINING PROGRAM

## 2025-07-23 PROCEDURE — 2500000003 HC RX 250 WO HCPCS: Performed by: STUDENT IN AN ORGANIZED HEALTH CARE EDUCATION/TRAINING PROGRAM

## 2025-07-23 PROCEDURE — 6370000000 HC RX 637 (ALT 250 FOR IP): Performed by: INTERNAL MEDICINE

## 2025-07-23 PROCEDURE — 6360000002 HC RX W HCPCS: Performed by: STUDENT IN AN ORGANIZED HEALTH CARE EDUCATION/TRAINING PROGRAM

## 2025-07-23 PROCEDURE — 85027 COMPLETE CBC AUTOMATED: CPT

## 2025-07-23 PROCEDURE — 80048 BASIC METABOLIC PNL TOTAL CA: CPT

## 2025-07-23 PROCEDURE — 6360000002 HC RX W HCPCS

## 2025-07-23 PROCEDURE — 6360000002 HC RX W HCPCS: Performed by: INTERNAL MEDICINE

## 2025-07-23 PROCEDURE — 2580000003 HC RX 258: Performed by: STUDENT IN AN ORGANIZED HEALTH CARE EDUCATION/TRAINING PROGRAM

## 2025-07-23 PROCEDURE — 36415 COLL VENOUS BLD VENIPUNCTURE: CPT

## 2025-07-23 PROCEDURE — 2500000003 HC RX 250 WO HCPCS: Performed by: INTERNAL MEDICINE

## 2025-07-23 RX ORDER — DIAZEPAM 10 MG/2ML
5 INJECTION, SOLUTION INTRAMUSCULAR; INTRAVENOUS EVERY 4 HOURS PRN
Status: DISCONTINUED | OUTPATIENT
Start: 2025-07-23 | End: 2025-07-26 | Stop reason: HOSPADM

## 2025-07-23 RX ORDER — LORAZEPAM 1 MG/1
2 TABLET ORAL
Status: DISCONTINUED | OUTPATIENT
Start: 2025-07-23 | End: 2025-07-26 | Stop reason: HOSPADM

## 2025-07-23 RX ORDER — DIAZEPAM 10 MG/2ML
10 INJECTION, SOLUTION INTRAMUSCULAR; INTRAVENOUS EVERY 4 HOURS PRN
Status: DISCONTINUED | OUTPATIENT
Start: 2025-07-23 | End: 2025-07-26 | Stop reason: HOSPADM

## 2025-07-23 RX ORDER — DIAZEPAM 10 MG/2ML
15 INJECTION, SOLUTION INTRAMUSCULAR; INTRAVENOUS EVERY 4 HOURS PRN
Status: DISCONTINUED | OUTPATIENT
Start: 2025-07-23 | End: 2025-07-26 | Stop reason: HOSPADM

## 2025-07-23 RX ORDER — DIAZEPAM 10 MG/2ML
20 INJECTION, SOLUTION INTRAMUSCULAR; INTRAVENOUS EVERY 4 HOURS PRN
Status: DISCONTINUED | OUTPATIENT
Start: 2025-07-23 | End: 2025-07-26 | Stop reason: HOSPADM

## 2025-07-23 RX ORDER — LORAZEPAM 1 MG/1
1 TABLET ORAL
Status: DISCONTINUED | OUTPATIENT
Start: 2025-07-23 | End: 2025-07-26 | Stop reason: HOSPADM

## 2025-07-23 RX ORDER — LORAZEPAM 1 MG/1
4 TABLET ORAL
Status: DISCONTINUED | OUTPATIENT
Start: 2025-07-23 | End: 2025-07-26 | Stop reason: HOSPADM

## 2025-07-23 RX ORDER — LORAZEPAM 1 MG/1
3 TABLET ORAL
Status: DISCONTINUED | OUTPATIENT
Start: 2025-07-23 | End: 2025-07-26 | Stop reason: HOSPADM

## 2025-07-23 RX ADMIN — HYDROCHLOROTHIAZIDE 25 MG: 25 TABLET ORAL at 08:49

## 2025-07-23 RX ADMIN — WATER 40 MG: 1 INJECTION INTRAMUSCULAR; INTRAVENOUS; SUBCUTANEOUS at 21:50

## 2025-07-23 RX ADMIN — ARFORMOTEROL TARTRATE: 15 SOLUTION RESPIRATORY (INHALATION) at 20:35

## 2025-07-23 RX ADMIN — OXYCODONE HYDROCHLORIDE AND ACETAMINOPHEN 500 MG: 500 TABLET ORAL at 08:48

## 2025-07-23 RX ADMIN — ROSUVASTATIN CALCIUM 10 MG: 10 TABLET, FILM COATED ORAL at 21:52

## 2025-07-23 RX ADMIN — SODIUM CHLORIDE, PRESERVATIVE FREE 10 ML: 5 INJECTION INTRAVENOUS at 08:48

## 2025-07-23 RX ADMIN — SODIUM CHLORIDE, PRESERVATIVE FREE 10 ML: 5 INJECTION INTRAVENOUS at 21:53

## 2025-07-23 RX ADMIN — IPRATROPIUM BROMIDE 0.5 MG: 0.5 SOLUTION RESPIRATORY (INHALATION) at 07:37

## 2025-07-23 RX ADMIN — IPRATROPIUM BROMIDE 0.5 MG: 0.5 SOLUTION RESPIRATORY (INHALATION) at 20:30

## 2025-07-23 RX ADMIN — APIXABAN 5 MG: 5 TABLET, FILM COATED ORAL at 08:47

## 2025-07-23 RX ADMIN — IPRATROPIUM BROMIDE 0.5 MG: 0.5 SOLUTION RESPIRATORY (INHALATION) at 11:34

## 2025-07-23 RX ADMIN — APIXABAN 5 MG: 5 TABLET, FILM COATED ORAL at 21:51

## 2025-07-23 RX ADMIN — DILTIAZEM HYDROCHLORIDE 60 MG: 60 TABLET, FILM COATED ORAL at 21:52

## 2025-07-23 RX ADMIN — ZINC SULFATE 220 MG (50 MG) CAPSULE 50 MG: CAPSULE at 08:47

## 2025-07-23 RX ADMIN — ASPIRIN 325 MG: 325 TABLET ORAL at 08:48

## 2025-07-23 RX ADMIN — OMEGA-3-ACID ETHYL ESTERS CAPSULES 1000 MG: 1 CAPSULE, LIQUID FILLED ORAL at 08:47

## 2025-07-23 RX ADMIN — ARFORMOTEROL TARTRATE: 15 SOLUTION RESPIRATORY (INHALATION) at 07:42

## 2025-07-23 RX ADMIN — Medication 100 MG: at 08:48

## 2025-07-23 RX ADMIN — FOLIC ACID 1000 MCG: 1 TABLET ORAL at 08:48

## 2025-07-23 RX ADMIN — DILTIAZEM HYDROCHLORIDE 60 MG: 60 TABLET, FILM COATED ORAL at 13:43

## 2025-07-23 RX ADMIN — AZITHROMYCIN MONOHYDRATE 500 MG: 500 INJECTION, POWDER, LYOPHILIZED, FOR SOLUTION INTRAVENOUS at 12:11

## 2025-07-23 RX ADMIN — DILTIAZEM HYDROCHLORIDE 60 MG: 60 TABLET, FILM COATED ORAL at 04:59

## 2025-07-23 RX ADMIN — IPRATROPIUM BROMIDE 0.5 MG: 0.5 SOLUTION RESPIRATORY (INHALATION) at 16:19

## 2025-07-23 RX ADMIN — WATER 40 MG: 1 INJECTION INTRAMUSCULAR; INTRAVENOUS; SUBCUTANEOUS at 12:08

## 2025-07-23 RX ADMIN — Medication 1000 UNITS: at 08:47

## 2025-07-23 NOTE — DISCHARGE INSTRUCTIONS
You have been given a copy of the American Heart Association's Heart Failure Educational Booklet.  Please read over this booklet and take it with you to your next physician appointment with any questions you may have.     For additional resources and to access the American Heart Association's Interactive Workbook \"Healthier Living with Heart Failure - Managing Symptoms and Reducing Risk,\" please scan the QR code below.               Download the Heart Failure [place] Claudine: Search in your Google Play Store ([employer] That) or [employer]am Claudine Store ([employer]): Search for- HF [place] Claudine.    https://www.heart.org/en/health-topics/heart-failure/heart-failure-tools-resources/lq-ynveno-xke    HF [place] is a brand-new phone claudine that helps you track daily symptoms, vitals, mood, energy level and more. You can even add your heart failure care team members to view your data and monitor your condition at home.    HF [place] Lets You:  Track symptoms, medications and more  Share health information with your health care team  Connect with others living with heart failure

## 2025-07-23 NOTE — PROGRESS NOTES
End of Shift Note    0730: Bedside and Verbal shift change report given to Rashmi Garcia RN (oncoming nurse) by oJhan Diez RN and JOSE A Kenyon (offgoing nurse). Report included the following information Nurse Handoff Report, Index, ED Encounter Summary, Adult Overview, Intake/Output, MAR, Recent Results, Med Rec Status, Cardiac Rhythm SR, Alarm Parameters, Quality Measures, and Neuro Assessment.     0800: Assessment completed. Pt repositioned in bed.     1000: Pt ambulated in hallway and returned to bed without oxygen. Pt denies shortness of breath. SPO2 is 90%.     1041: Pulmonology at the bedside. Echo at the bedside.     1200: Reassessment completed. Pt repositioned in bed.     1400: Pt repositioned in bed.     1600: Reassessment completed. Pt repositioned in bed.     1800: Pt repositioned in bed.     1930: Bedside shift change report given to Johan Diez RN and JOSE A Kenyon (oncoming nurses) by Kanchan Garcia RN (offgoing nurse).  Report included the following information Nurse Handoff Report, Index, ED Encounter Summary, Adult Overview, Intake/Output, MAR, Recent Results, Med Rec Status, Cardiac Rhythm SR, Alarm Parameters, Quality Measures, and Neuro Assessment.     Shift worked:  2211-4669     Shift summary and any significant changes:     Pt ambulated in hallway on Room air. TTE done at the bedside. EF 60-65%.     Concerns for physician to address:  Discharge planning     Zone phone for oncoming shift:   [phone]       Activity:  Level of Assistance: Independent  Number times ambulated in hallways past shift: 1  Number of times OOB to chair past shift: 3    Cardiac:   Cardiac Monitoring: Yes      Cardiac Rhythm: Sinus rhythm    Access:  Current line(s): PIV    Genitourinary:   Urinary Status: Voiding    Respiratory:   O2 Device: Nasal cannula  Chronic home O2 use?: NO  Incentive spirometer at bedside: N/A    GI:  Last BM (including prior to admit): 07/21/25  Current diet:  ADULT DIET;  Regular  Passing flatus: YES    Pain Management:   Patient states pain is manageable on current regimen: YES    Skin:  Dioni Scale Score: 21  Interventions: Wound Offloading (Prevention Methods): Pillows, Repositioning, Turning    Patient Safety:  Fall Risk: Nursing Judgement-Fall Risk High(Add Comments): Yes  Fall Risk Interventions  Nursing Judgement-Fall Risk High(Add Comments): Yes  Toilet Every 2 Hours-In Advance of Need: Yes  Hourly Visual Checks: Awake  Fall Visual Posted: Socks, Fall sign posted  Room Door Open: Yes  Alarm On: Bed  Patient Moved Closer to Nursing Station: No    Active Consults:   IP CONSULT TO CARDIOLOGY  IP CONSULT TO PULMONOLOGY  IP CONSULT TO SOCIAL WORK    Length of Stay:  Expected LOS: 3  Actual LOS: 2    Kanchan Garcia RN

## 2025-07-23 NOTE — PROGRESS NOTES
Pulmonary, Critical Care, and Sleep Medicine~Consult Note    Name: Ranulfo Hernandez MRN: [id number]   : 1955 Hospital: [facility]   Date: 2025 11:55 AM Admission: 2025     Impression Plan   Suspected COPD, + acute exacerbation  Afib RVR, s/p cardioversion in 2019, not currently on anticoagulation  Alcohol Use Disorder  Hx Prostate Cancer s/p prostatectomy  Hyperthyroidism  HTN  Osteomyelitis  Tobacco Use Disorder  ETOH use   Recurrent Falls  Peripheral Eosinophilia  O2 for sats >88%. Repeat exercise oximetry prior to discharge  Bronchodilators, recommend discharge with Trelegy 100 or Breztri, previously on Anoro   Levalbuterol PRN  Azithro for COPD exacerbation  Follow up sputum Cx, atypicals  Continue IVCS   TTE pending  Pulmonary Toilet  Smoking Cessation - wants to continue Nicotine patches  Eliquis  Fall Precautions  CIWA  Would qualify for LDCT on discharge      Recommend follow up in the OP setting for PFTs and further evaluation and management of pulmonary disease.        Daily Progression:    : feeling much better today. Shortness of breath and wheeze are improving. He walked the halls today and O2 sats remained in the 90s per nurse.  On my eval he is on RA satting 87-90%    : Reports feeling significantly better since admission, wheeze is improving. Has not been out of bed yet but plans to today. On room air currently.     CONSULT NOTE:   Consulted by hospitalist service for \"COPD exacerbation, not on appropriate meds.\" Initially presented to the ED today for shortness of breath and chest pain.  Started experiencing shortness of breath and chest tightness over the past couple days.  Albuterol MDI use greater than 20 times per day with no relief.  Denies any sick contacts.  Did note diarrhea, chills, nausea possibly related to food poisoning from fishing trip.  Afebrile. AFIB RVR. Labs significant for sodium 139, potassium 3.4, chloride 106,  inhaler Inhale 1 puff into the lungs every 6 hours as needed for Shortness of Breath or Wheezing   Yes Automatic Reconciliation, Ar   umeclidinium-vilanterol (ANORO ELLIPTA) 62.5-25 MCG/ACT inhaler Inhale 1 puff into the lungs daily    Automatic Reconciliation, Ar     No Known Allergies   Social History     Tobacco Use    Smoking status: Every Day     Current packs/day: 1.00     Types: Cigarettes    Smokeless tobacco: Never   Substance Use Topics    Alcohol use: Yes     Alcohol/week: 5.0 standard drinks of alcohol     Comment: Per pt, \"I'm an alcoholic\". Daily drinks 40oz beer + plus shots      Family History   Problem Relation Age of Onset    Heart Disease Mother     Cancer Father         PROSTATE    Diabetes Maternal Grandmother     Anesth Problems Neg Hx     Cancer Mother         LUNG     OBJECTIVE:     Vital Signs:     BP (!) 142/68   Pulse 64   Temp 98.1 °F (36.7 °C) (Oral)   Resp 17   Ht 1.93 m (6' 4\")   Wt 97.5 kg (215 lb)   SpO2 90%   BMI 26.17 kg/m²    Temp (24hrs), Av.4 °F (36.9 °C), Min:98 °F (36.7 °C), Max:98.8 °F (37.1 °C)     Intake/Output:     Last shift: 701 - 1900  In: 430 [P.O.:180]  Out: -     Last 3 shifts: 1901 -  0700  In: 2144.1 [P.O.:840; I.V.:64.4]  Out: 3700 [Urine:3700]        Intake/Output Summary (Last 24 hours) at 2025 1155  Last data filed at 2025 0930  Gross per 24 hour   Intake 1270 ml   Output 2775 ml   Net -1505 ml       Physical Exam:                                        Exam Findings Other   General: No resp distress noted, appears stated age    HEENT:  NCAT    Chest: No pectus deformity, normal chest rise b/l    HEART:  RRR, no murmurs    Lungs:  Bilateral wheeze, no rhonchi/rales    ABD: Soft/NT    EXT: No cyanosis/clubbing/edema    Skin: No rashes     Neuro: A/O x 3        Medications:  Current Facility-Administered Medications   Medication Dose Route Frequency    LORazepam (ATIVAN) tablet 1 mg  1 mg Oral Q1H PRN    Or    diazePAM

## 2025-07-23 NOTE — PROGRESS NOTES
EP/ ARRHYTHMIA Progress Note    Patient ID:  Patient: Ranulfo Hernandez  MRN: [id number]  Age: 70 y.o.  : 1955    Date of  Admission: 2025  8:50 AM   PCP:  Kim Trinh MD    Assessment:   Paroxysmal atrial fibrillation.  COPD exacerbation.  Alcohol excess history..  Remote IV methamphetamine use.  History of prostate CA.  Full code.    Plan:     Continue Lovenox until he commits to oral anticoagulation.  Change to oral diltiazem tomorrow.  Agree with treating for COPD exacerbation.  Echo pending.  Let's see if there is an element of CHF.     update:  Change to oral diltiazem.  Change enoxaparin to Eliquis.  Echo pending.     update:  Remains in sinus (with PAC's).  Continue diltiazem, Eliquis.  Echo showed EF 60-65%, thick interatrial septum, normal R heart, no effusion.  The aortic root is slightly enlarged, though he's a bigger krystal (6'4\").  I am OK with discharge tomorrow AM from a cardiac standpoint.  He can follow-up in the office within one month or so.  He reports a history of abdominal aortic aneurysm (3.4 x 3 cm on duplex in 2018), this can be imaged as an outpatient in our office to update.    Left Ventricle Normal left ventricular systolic function with a visually estimated EF of 60 - 65%. Left ventricle size is normal. Mildly increased wall thickness. Normal wall motion.   Left Atrium Left atrium size is normal.   Interatrial Septum Incidental note of lipomatous intra-atrial septum.   Right Ventricle Right ventricle size is normal. Normal systolic function.   Right Atrium Right atrium size is normal.   Aortic Valve Sclerosis of the aortic valve cusps. No regurgitation. No stenosis.   Mitral Valve Valve structure is normal. No regurgitation. No stenosis noted.   Tricuspid Valve Valve structure is normal. Trace regurgitation. No stenosis noted.   Pulmonic Valve The pulmonic valve visualization is suboptimal but appears to be functioning normally.   Aorta  mg  100 mg Oral Daily    arformoterol 15 mcg-budesonide 0.5 mg neb solution   Nebulization BID RT    ipratropium (ATROVENT) 0.02 % nebulizer solution 0.5 mg  0.5 mg Nebulization 4x Daily RT       Review of Symptoms:  See HPI as well.  General: negative for fever, chills, sweats, weakness, weight loss   Eyes: negative for blurred vision, eye pain, loss of vision, diplopia   Ear Nose and Throat: negative for rhinorrhea, pharyngitis, otalgia, tinnitus, speech or swallowing difficulties   Respiratory: negative for hemoptysis, pleuritic pain   Cardiology: negative for chest pain, palpitations, orthopnea, PND, edema, syncope   Gastrointestinal: negative for abdominal pain, N/V, dysphagia, change in bowel habits, bleeding   Genitourinary: negative for frequency, urgency, dysuria, hematuria, incontinence   Muskuloskeletal : negative for arthralgia, myalgia   Hematology: negative for easy bruising, bleeding, lymphadenopathy   Dermatological: negative for rash, ulceration, mole change, new lesion   Endocrine: negative for hot flashes or polydipsia   Neurological: negative for headache, dizziness, confusion, focal weakness, paresthesia, memory loss, gait disturbance   Psychological: negative for anxiety, depression, agitation       Objective:      Physical Exam:  Temp (24hrs), Av.3 °F (36.8 °C), Min:97.9 °F (36.6 °C), Max:98.8 °F (37.1 °C)    Patient Vitals for the past 8 hrs:   Pulse   25 1626 66   25 1343 64    Patient Vitals for the past 8 hrs:   Resp   25 1626 18    Patient Vitals for the past 8 hrs:   BP   25 1626 (!) 140/90   25 1343 136/68        Intake/Output Summary (Last 24 hours) at 2025 1915  Last data filed at 2025 1600  Gross per 24 hour   Intake 1993.24 ml   Output 3275 ml   Net -1281.76 ml       Nondiaphoretic, not in acute distress.  Supple, no palpable thyromegaly.  No scleral icterus, mucous membranes moist, conjuctivae pink, no xanthelasma.  Unlabored, +wheezing

## 2025-07-23 NOTE — PLAN OF CARE
Problem: Neurosensory - Adult  Goal: Achieves stable or improved neurological status  Outcome: Progressing  Flowsheets (Taken 7/23/2025 0800)  Achieves stable or improved neurological status:   Assess for and report changes in neurological status   Initiate measures to prevent increased intracranial pressure   Maintain blood pressure and fluid volume within ordered parameters to optimize cerebral perfusion and minimize risk of hemorrhage   Monitor temperature, glucose, and sodium. Initiate appropriate interventions as ordered  Goal: Absence of seizures  Outcome: Progressing  Flowsheets (Taken 7/23/2025 0800)  Absence of seizures:   Monitor for seizure activity.  If seizure occurs, document type and location of movements and any associated apnea   If seizure occurs, turn head to side and suction secretions as needed   Administer anticonvulsants as ordered   Support airway/breathing, administer oxygen as needed   Diagnostic studies as ordered  Goal: Achieves maximal functionality and self care  Outcome: Progressing  Flowsheets (Taken 7/23/2025 0800)  Achieves maximal functionality and self care:   Monitor swallowing and airway patency with patient fatigue and changes in neurological status   Encourage and assist patient to increase activity and self care with guidance from physical therapy/occupational therapy   Encourage visually impaired, hearing impaired and aphasic patients to use assistive/communication devices     Problem: Respiratory - Adult  Goal: Achieves optimal ventilation and oxygenation  7/23/2025 1038 by Kanchan Garcia, RN  Outcome: Progressing  Flowsheets (Taken 7/23/2025 0800)  Achieves optimal ventilation and oxygenation:   Assess for changes in respiratory status   Assess for changes in mentation and behavior   Position to facilitate oxygenation and minimize respiratory effort   Oxygen supplementation based on oxygen saturation or arterial blood gases   Initiate smoking cessation protocol as

## 2025-07-23 NOTE — PROGRESS NOTES
End of Shift Note    Bedside shift change report given to Rahsmi NARANJO (oncoming nurse) by Johan Diez RN (offgoing nurse).  Report included the following information SBAR, Cardiac Rhythm Normal Sinus, and Alarm Parameters     Shift worked:  7pm-7am     Shift summary and any significant changes:     none     Concerns for physician to address:  none     Zone phone for oncoming shift:   [phone]       Activity:  Level of Assistance: Independent  Number times ambulated in hallways past shift: 0  Number of times OOB to chair past shift: 0    Cardiac:   Cardiac Monitoring: Yes      Cardiac Rhythm: Sinus rhythm    Access:  Current line(s): PIV    Genitourinary:   Urinary Status: Voiding    Respiratory:   O2 Device: Nasal cannula  Chronic home O2 use?: NO  Incentive spirometer at bedside: NO    GI:  Last BM (including prior to admit): 07/21/25  Current diet:  ADULT DIET; Regular  Passing flatus: YES    Pain Management:   Patient states pain is manageable on current regimen: YES    Skin:  Dioni Scale Score: 21  Interventions: Wound Offloading (Prevention Methods): Pillows, Repositioning, Turning    Patient Safety:  Fall Risk: Nursing Judgement-Fall Risk High(Add Comments): Yes  Fall Risk Interventions  Nursing Judgement-Fall Risk High(Add Comments): Yes  Toilet Every 2 Hours-In Advance of Need: Yes  Hourly Visual Checks: Awake  Fall Visual Posted: Socks, Fall sign posted  Room Door Open: Yes  Alarm On: Bed  Patient Moved Closer to Nursing Station: No    Active Consults:   IP CONSULT TO CARDIOLOGY  IP CONSULT TO PULMONOLOGY  IP CONSULT TO SOCIAL WORK    Length of Stay:  Expected LOS: 3  Actual LOS: 2    Johan Diez RN

## 2025-07-23 NOTE — PLAN OF CARE
Problem: Skin/Tissue Integrity - Adult  Goal: Skin integrity remains intact  7/23/2025 0244 by Johan Diez, RN  Outcome: Progressing  7/22/2025 1247 by Radha Perera, RN  Outcome: Progressing  Flowsheets (Taken 7/22/2025 0920)  Skin Integrity Remains Intact:   Assess vascular access sites hourly   Monitor for areas of redness and/or skin breakdown   Every 4-6 hours minimum:  Change oxygen saturation probe site   Turn and reposition as indicated   Every 4-6 hours:  If on nasal continuous positive airway pressure, assess nares and determine need for appliance change or resting period   Assess need for specialty bed   Positioning devices   Pressure redistribution bed/mattress (bed type)   Check visual cues for pain   Monitor skin under medical devices

## 2025-07-23 NOTE — PROGRESS NOTES
Hospitalist Progress Note    NAME:   Ranulfo Hernandez   : 1955   MRN: [id number]     Date/Time: 2025 12:28 PM  Patient PCP: Kim Trinh MD    Estimated discharge date:  Barriers:       Assessment / Plan:    Acute COPD exacerbation  - Chest x-ray shows no acute process.  proBNP is 400.  - Chest x-ray shows no acute process  Completed azithromycin x 3 days  Wean down Solu-Medrol to 40 mg every 12 hours  Continue nebs  Exercise pulse oximetry before discharge    Atrial fibrillation with rapid ventricular rate  - Status post Cardizem drip.  Continue therapeutic Lovenox.  Cardiology following and appreciate recommendations.  Echo pending  Switch to oral Cardizem 60 mg every 8 hours    Chronic alcoholism with concern for alcohol withdrawal  - Continue thiamine and folic acid.  Continue CIWA protocol with Ativan    Hx prostate cancer s/p prostatectomy  Hyperthyroidism  HLD  HTN  - TSH is currently normal  - Continue Norvasc, statin      Medical Decision Making:   I personally reviewed labs: CBC, BMP  I personally reviewed imaging: Chest x-ray  I personally reviewed EKG: Telemetry  Toxic drug monitoring: Monitor blood sugar while on Solu-Medrol  Discussed case with: Patient RN case management, IDR        Code Status: Full code  DVT Prophylaxis: Lovenox  GI Prophylaxis:    Subjective:     Chief Complaint / Reason for Physician Visit  Shortness of breath improving since admission.  Patient was able to ambulate today on and off oxygen        Objective:     VITALS:   Last 24hrs VS reviewed since prior progress note. Most recent are:  Patient Vitals for the past 24 hrs:   BP Temp Temp src Pulse Resp SpO2   25 1145 -- -- -- -- -- 90 %   25 1134 -- -- -- -- -- 90 %   25 1102 (!) 142/68 98.1 °F (36.7 °C) Oral 64 17 91 %   25 0850 129/67 -- -- 63 15 (!) 89 %   25 0849 129/67 -- -- -- -- --   25 0758 -- -- -- 64 14 97 %   25 0737 -- -- -- 71 20 93 %   25 0730 (!)

## 2025-07-24 LAB
ANION GAP SERPL CALC-SCNC: 5 MMOL/L (ref 2–12)
BACTERIA SPEC CULT: NORMAL
BASOPHILS # BLD: 0.01 K/UL (ref 0–0.1)
BASOPHILS NFR BLD: 0.1 % (ref 0–1)
BUN SERPL-MCNC: 17 MG/DL (ref 6–20)
BUN/CREAT SERPL: 20 (ref 12–20)
CALCIUM SERPL-MCNC: 9.5 MG/DL (ref 8.5–10.1)
CHLORIDE SERPL-SCNC: 106 MMOL/L (ref 97–108)
CO2 SERPL-SCNC: 28 MMOL/L (ref 21–32)
CREAT SERPL-MCNC: 0.84 MG/DL (ref 0.7–1.3)
DIFFERENTIAL METHOD BLD: ABNORMAL
EOSINOPHIL # BLD: 0 K/UL (ref 0–0.4)
EOSINOPHIL NFR BLD: 0 % (ref 0–7)
ERYTHROCYTE [DISTWIDTH] IN BLOOD BY AUTOMATED COUNT: 14.2 % (ref 11.5–14.5)
GLUCOSE BLD STRIP.AUTO-MCNC: 116 MG/DL (ref 65–117)
GLUCOSE SERPL-MCNC: 164 MG/DL (ref 65–100)
GRAM STN SPEC: NORMAL
HCT VFR BLD AUTO: 48.6 % (ref 36.6–50.3)
HGB BLD-MCNC: 15.8 G/DL (ref 12.1–17)
IMM GRANULOCYTES # BLD AUTO: 0.17 K/UL (ref 0–0.04)
IMM GRANULOCYTES NFR BLD AUTO: 1.4 % (ref 0–0.5)
LYMPHOCYTES # BLD: 0.64 K/UL (ref 0.8–3.5)
LYMPHOCYTES NFR BLD: 5.2 % (ref 12–49)
M PNEUMO DNA SPEC QL NAA+PROBE: NEGATIVE
MCH RBC QN AUTO: 32.3 PG (ref 26–34)
MCHC RBC AUTO-ENTMCNC: 32.5 G/DL (ref 30–36.5)
MCV RBC AUTO: 99.4 FL (ref 80–99)
MONOCYTES # BLD: 0.31 K/UL (ref 0–1)
MONOCYTES NFR BLD: 2.5 % (ref 5–13)
NEUTS SEG # BLD: 11.17 K/UL (ref 1.8–8)
NEUTS SEG NFR BLD: 90.8 % (ref 32–75)
NRBC # BLD: 0 K/UL (ref 0–0.01)
NRBC BLD-RTO: 0 PER 100 WBC
PLATELET # BLD AUTO: 195 K/UL (ref 150–400)
PMV BLD AUTO: 10.1 FL (ref 8.9–12.9)
POTASSIUM SERPL-SCNC: 4.1 MMOL/L (ref 3.5–5.1)
RBC # BLD AUTO: 4.89 M/UL (ref 4.1–5.7)
RBC MORPH BLD: ABNORMAL
SERVICE CMNT-IMP: NORMAL
SERVICE CMNT-IMP: NORMAL
SODIUM SERPL-SCNC: 139 MMOL/L (ref 136–145)
SPECIMEN SOURCE: NORMAL
WBC # BLD AUTO: 12.3 K/UL (ref 4.1–11.1)

## 2025-07-24 PROCEDURE — 80048 BASIC METABOLIC PNL TOTAL CA: CPT

## 2025-07-24 PROCEDURE — 6360000002 HC RX W HCPCS: Performed by: INTERNAL MEDICINE

## 2025-07-24 PROCEDURE — 94640 AIRWAY INHALATION TREATMENT: CPT

## 2025-07-24 PROCEDURE — 6360000002 HC RX W HCPCS

## 2025-07-24 PROCEDURE — 2500000003 HC RX 250 WO HCPCS: Performed by: STUDENT IN AN ORGANIZED HEALTH CARE EDUCATION/TRAINING PROGRAM

## 2025-07-24 PROCEDURE — 94669 MECHANICAL CHEST WALL OSCILL: CPT

## 2025-07-24 PROCEDURE — 36415 COLL VENOUS BLD VENIPUNCTURE: CPT

## 2025-07-24 PROCEDURE — 6370000000 HC RX 637 (ALT 250 FOR IP): Performed by: STUDENT IN AN ORGANIZED HEALTH CARE EDUCATION/TRAINING PROGRAM

## 2025-07-24 PROCEDURE — 97535 SELF CARE MNGMENT TRAINING: CPT

## 2025-07-24 PROCEDURE — 97116 GAIT TRAINING THERAPY: CPT

## 2025-07-24 PROCEDURE — 6370000000 HC RX 637 (ALT 250 FOR IP): Performed by: PHYSICIAN ASSISTANT

## 2025-07-24 PROCEDURE — 82962 GLUCOSE BLOOD TEST: CPT

## 2025-07-24 PROCEDURE — 97165 OT EVAL LOW COMPLEX 30 MIN: CPT

## 2025-07-24 PROCEDURE — 97161 PT EVAL LOW COMPLEX 20 MIN: CPT

## 2025-07-24 PROCEDURE — 2500000003 HC RX 250 WO HCPCS: Performed by: INTERNAL MEDICINE

## 2025-07-24 PROCEDURE — 6370000000 HC RX 637 (ALT 250 FOR IP): Performed by: INTERNAL MEDICINE

## 2025-07-24 PROCEDURE — 85025 COMPLETE CBC W/AUTO DIFF WBC: CPT

## 2025-07-24 PROCEDURE — 2060000000 HC ICU INTERMEDIATE R&B

## 2025-07-24 PROCEDURE — 2700000000 HC OXYGEN THERAPY PER DAY

## 2025-07-24 RX ORDER — GUAIFENESIN 600 MG/1
1200 TABLET, EXTENDED RELEASE ORAL 2 TIMES DAILY
Status: DISCONTINUED | OUTPATIENT
Start: 2025-07-24 | End: 2025-07-26 | Stop reason: HOSPADM

## 2025-07-24 RX ORDER — LEVALBUTEROL INHALATION SOLUTION 0.63 MG/3ML
0.63 SOLUTION RESPIRATORY (INHALATION) EVERY 6 HOURS PRN
Status: DISCONTINUED | OUTPATIENT
Start: 2025-07-24 | End: 2025-07-26 | Stop reason: HOSPADM

## 2025-07-24 RX ADMIN — IPRATROPIUM BROMIDE 0.5 MG: 0.5 SOLUTION RESPIRATORY (INHALATION) at 07:22

## 2025-07-24 RX ADMIN — POLYETHYLENE GLYCOL 3350 17 G: 17 POWDER, FOR SOLUTION ORAL at 22:00

## 2025-07-24 RX ADMIN — IPRATROPIUM BROMIDE 0.5 MG: 0.5 SOLUTION RESPIRATORY (INHALATION) at 12:09

## 2025-07-24 RX ADMIN — OMEGA-3-ACID ETHYL ESTERS CAPSULES 1000 MG: 1 CAPSULE, LIQUID FILLED ORAL at 09:47

## 2025-07-24 RX ADMIN — IPRATROPIUM BROMIDE 0.5 MG: 0.5 SOLUTION RESPIRATORY (INHALATION) at 19:54

## 2025-07-24 RX ADMIN — ROSUVASTATIN CALCIUM 10 MG: 10 TABLET, FILM COATED ORAL at 21:58

## 2025-07-24 RX ADMIN — DILTIAZEM HYDROCHLORIDE 60 MG: 60 TABLET, FILM COATED ORAL at 05:29

## 2025-07-24 RX ADMIN — FOLIC ACID 1000 MCG: 1 TABLET ORAL at 09:49

## 2025-07-24 RX ADMIN — HYDROCHLOROTHIAZIDE 25 MG: 25 TABLET ORAL at 09:50

## 2025-07-24 RX ADMIN — SODIUM CHLORIDE, PRESERVATIVE FREE 10 ML: 5 INJECTION INTRAVENOUS at 09:50

## 2025-07-24 RX ADMIN — DILTIAZEM HYDROCHLORIDE 60 MG: 60 TABLET, FILM COATED ORAL at 14:45

## 2025-07-24 RX ADMIN — Medication 1000 UNITS: at 09:50

## 2025-07-24 RX ADMIN — APIXABAN 5 MG: 5 TABLET, FILM COATED ORAL at 21:58

## 2025-07-24 RX ADMIN — ASPIRIN 325 MG: 325 TABLET ORAL at 09:48

## 2025-07-24 RX ADMIN — ARFORMOTEROL TARTRATE: 15 SOLUTION RESPIRATORY (INHALATION) at 07:22

## 2025-07-24 RX ADMIN — SODIUM CHLORIDE, PRESERVATIVE FREE 10 ML: 5 INJECTION INTRAVENOUS at 09:49

## 2025-07-24 RX ADMIN — METHYLPREDNISOLONE SODIUM SUCCINATE 40 MG: 40 INJECTION, POWDER, LYOPHILIZED, FOR SOLUTION INTRAMUSCULAR; INTRAVENOUS at 21:58

## 2025-07-24 RX ADMIN — DILTIAZEM HYDROCHLORIDE 60 MG: 60 TABLET, FILM COATED ORAL at 21:58

## 2025-07-24 RX ADMIN — SODIUM CHLORIDE, PRESERVATIVE FREE 10 ML: 5 INJECTION INTRAVENOUS at 22:03

## 2025-07-24 RX ADMIN — ZINC SULFATE 220 MG (50 MG) CAPSULE 50 MG: CAPSULE at 09:48

## 2025-07-24 RX ADMIN — OXYCODONE HYDROCHLORIDE AND ACETAMINOPHEN 500 MG: 500 TABLET ORAL at 09:47

## 2025-07-24 RX ADMIN — SODIUM CHLORIDE, PRESERVATIVE FREE 10 ML: 5 INJECTION INTRAVENOUS at 22:02

## 2025-07-24 RX ADMIN — APIXABAN 5 MG: 5 TABLET, FILM COATED ORAL at 09:50

## 2025-07-24 RX ADMIN — Medication 100 MG: at 09:47

## 2025-07-24 RX ADMIN — IPRATROPIUM BROMIDE 0.5 MG: 0.5 SOLUTION RESPIRATORY (INHALATION) at 16:26

## 2025-07-24 RX ADMIN — ARFORMOTEROL TARTRATE: 15 SOLUTION RESPIRATORY (INHALATION) at 19:54

## 2025-07-24 RX ADMIN — METHYLPREDNISOLONE SODIUM SUCCINATE 40 MG: 40 INJECTION, POWDER, LYOPHILIZED, FOR SOLUTION INTRAMUSCULAR; INTRAVENOUS at 09:47

## 2025-07-24 RX ADMIN — GUAIFENESIN 1200 MG: 600 TABLET, EXTENDED RELEASE ORAL at 21:58

## 2025-07-24 NOTE — PROGRESS NOTES
OCCUPATIONAL THERAPY EVALUATION/DISCHARGE  Patient: Ranulfo Hernandez (70 y.o. male)  Date: 7/24/2025  Primary Diagnosis: Shortness of breath [R06.02]  Paroxysmal atrial fibrillation (HCC) [I48.0]  A-fib (HCC) [I48.91]  COPD exacerbation (HCC) [J44.1]  Atrial fibrillation with rapid ventricular response (HCC) [I48.91]         Precautions:                    ASSESSMENT :  Based on the objective data below, the patient presents to acute care OT services due to admission for SOB d/t afib and COPD exacerbation. Pt demonstrates to be at his functional baseline for bed mobility (independent) and close to baseline for ambulation requiring up to supervision due to slight unsteadiness w/ noticeable trunk sway (recent R big toe amputation in the last year, per pt); no LOB noticed or DME utilized. Pt demonstrated ability to complete necessary ADLs w/ no physical assistance required, regarding seated and standing dynamic ADL intervention; BUE very functional and able to assist. Pt was placed on RA during entirety of session and ranged from 89-91% (which may be pt's baseline considering hx of COPD and smoking). Educated pt on PLB benefits and use of incentive spirometer w/ pt demonstrating fair understanding of education. At this time, pt does not require further acute care OT services, nor at time of dc. Recommend dc back to familiar, home environment. Pt left in chair with all needs met, call bell in lap, alarm on.     Functional Outcome Measure:  The patient scored 90/100 on the Barthel Index outcome measure     Further skilled acute occupational therapy is not indicated at this time.     PLAN :    Recommendation for discharge: (in order for the patient to meet his/her long term goals):   No skilled occupational therapy    Other factors to consider for discharge: no additional factors    IF patient discharges home will need the following DME: none     SUBJECTIVE:   Patient stated, “I like to have a cold beer every now and

## 2025-07-24 NOTE — PROGRESS NOTES
Pulmonary, Critical Care, and Sleep Medicine~Consult Note    Name: Ranulfo Hernandez MRN: [id number]   : 1955 Hospital: [facility]   Date: 2025 2:45 PM Admission: 2025     Impression Plan   Suspected COPD, + acute exacerbation  Afib RVR, s/p cardioversion in 2019, not currently on anticoagulation  Alcohol Use Disorder  Hx Prostate Cancer s/p prostatectomy  Hyperthyroidism  HTN  Osteomyelitis  Tobacco Use Disorder  ETOH use   Recurrent Falls  Peripheral Eosinophilia  O2 for sats >88%. Repeat exercise oximetry prior to discharge  Agree w/ increasing bronchodilator frequency (avoiding albuterol use with afib), recommend discharge with Trelegy 100 or Breztri, previously on Anoro   Levalbuterol PRN  Azithro for COPD exacerbation  Follow up sputum Cx  Continue IVCS at current dose  Add mucinex, flutter valve  Smoking Cessation - wants to continue Nicotine patches  Eliquis  Fall Precautions  CIWA  Would qualify for LDCT on discharge      Recommend follow up in the OP setting for PFTs and further evaluation and management of pulmonary disease.        Daily Progression:    : discharge cancelled due to increased O2 requirement--now on 4L which I was able to wean to 3L  He is disappointed he's not going home  Says his breathing is improving  Cough productive of clear, white, or yellow sputum    : feeling much better today. Shortness of breath and wheeze are improving. He walked the halls today and O2 sats remained in the 90s per nurse.  On my eval he is on RA satting 87-90%    : Reports feeling significantly better since admission, wheeze is improving. Has not been out of bed yet but plans to today. On room air currently.     CONSULT NOTE:   Consulted by hospitalist service for \"COPD exacerbation, not on appropriate meds.\" Initially presented to the ED today for shortness of breath and chest pain.  Started experiencing shortness of breath and chest tightness over the

## 2025-07-24 NOTE — PROGRESS NOTES
Hospitalist Progress Note    NAME:   Ranulfo Hernandez   : 1955   MRN: [id number]     Date/Time: 2025 5:01 PM  Patient PCP: Kim Trinh MD    Estimated discharge date:  Barriers:       Assessment / Plan:    Acute COPD exacerbation  - Chest x-ray shows no acute process.  proBNP is 400.  - Chest x-ray shows no acute process  Completed azithromycin x 3 days  Wean down Solu-Medrol to 40 mg every 12 hours  Continue nebs increased to 4 times daily  Exercise pulse oximetry before discharge  Patient desaturated and currently on 4 L nasal cannula  Added Mucinex and flutter valve by pulmonology  Appreciate pulm recs    Atrial fibrillation with rapid ventricular rate  - Status post Cardizem drip.  Continue therapeutic Lovenox.  Cardiology following and appreciate recommendations.  Echo pending  Switch to oral Cardizem 60 mg every 8 hours  Cleared from cardiology standpoint for discharge    Chronic alcoholism with concern for alcohol withdrawal  - Continue thiamine and folic acid.  Continue CIWA protocol with Ativan  Not in active withdrawal    Hx prostate cancer s/p prostatectomy  Hyperthyroidism  HLD  HTN  - TSH is currently normal  - Continue Norvasc, statin    Tobacco use  On nicotine      Medical Decision Making:   I personally reviewed labs: CBC, BMP  I personally reviewed imaging:   I personally reviewed EKG: Telemetry  Toxic drug monitoring: Monitor blood sugar while on Solu-Medrol  Discussed case with: Patient RN case management, IDR        Code Status: Full code  DVT Prophylaxis: Lovenox  GI Prophylaxis:    Subjective:     Chief Complaint / Reason for Physician Visit  Patient desaturated morning and currently on 4 L nasal cannula.  Patient also feel congested and weak      Objective:     VITALS:   Last 24hrs VS reviewed since prior progress note. Most recent are:  Patient Vitals for the past 24 hrs:   BP Temp Temp src Pulse Resp SpO2   25 1637 -- -- -- 83 16 93 %   25 1627 -- -- -- 80

## 2025-07-24 NOTE — PROGRESS NOTES
EP/ ARRHYTHMIA Progress Note    Patient ID:  Patient: Ranulfo Hernandez  MRN: [id number]  Age: 70 y.o.  : 1955    Date of  Admission: 2025  8:50 AM   PCP:  Kim Trinh MD    Assessment:   Paroxysmal atrial fibrillation.  COPD exacerbation.  Alcohol excess history..  Remote IV methamphetamine use.  History of prostate CA.  Full code.    Plan:     Continue Lovenox until he commits to oral anticoagulation.  Change to oral diltiazem tomorrow.  Agree with treating for COPD exacerbation.  Echo below . Preserved LVEF     update:  Change to oral diltiazem.  Change enoxaparin to Eliquis.  Echo pending.     update:  Remains in sinus (with PAC's).  Continue diltiazem, Eliquis.  Echo showed EF 60-65%, thick interatrial septum, normal R heart, no effusion.  The aortic root is slightly enlarged, though he's a bigger krystal (6'4\").  I am OK with discharge tomorrow AM from a cardiac standpoint.  He can follow-up in the office within one month or so.  He reports a history of abdominal aortic aneurysm (3.4 x 3 cm on duplex in 2018), this can be imaged as an outpatient in our office to update.    2025 update: Remains in NSR. VSS. Continue Dilt and Eliquis. He is staying today due to increasing cough and congestion.    2025 update: Tele with frequent PACs. No AF. Remains on oxygen. K 4.4, Mg 2.2.   Still on steroids for  exacerbation. Nothing further to add today  Left Ventricle Normal left ventricular systolic function with a visually estimated EF of 60 - 65%. Left ventricle size is normal. Mildly increased wall thickness. Normal wall motion.   Left Atrium Left atrium size is normal.   Interatrial Septum Incidental note of lipomatous intra-atrial septum.   Right Ventricle Right ventricle size is normal. Normal systolic function.   Right Atrium Right atrium size is normal.   Aortic Valve Sclerosis of the aortic valve cusps. No regurgitation. No stenosis.   Mitral Valve Valve  hours.      PAOLO Jung NP 07/24/25 12:55 PM

## 2025-07-24 NOTE — PLAN OF CARE
Problem: Neurosensory - Adult  Goal: Achieves stable or improved neurological status  Outcome: Progressing  Goal: Achieves maximal functionality and self care  Outcome: Progressing     Problem: Respiratory - Adult  Goal: Achieves optimal ventilation and oxygenation  7/24/2025 1115 by Nadine Zeng RN  Outcome: Progressing  7/24/2025 0725 by Neville Jean Baptiste RCP  Outcome: Progressing     Problem: Cardiovascular - Adult  Goal: Maintains optimal cardiac output and hemodynamic stability  7/24/2025 1115 by Nadine Zeng RN  Outcome: Progressing  Flowsheets (Taken 7/24/2025 0820)  Maintains optimal cardiac output and hemodynamic stability: Monitor blood pressure and heart rate  7/24/2025 0456 by Johan Diez RN  Outcome: Progressing  Goal: Absence of cardiac dysrhythmias or at baseline  Outcome: Progressing     Problem: Skin/Tissue Integrity - Adult  Goal: Skin integrity remains intact  Outcome: Progressing  Flowsheets (Taken 7/24/2025 0820)  Skin Integrity Remains Intact: Monitor for areas of redness and/or skin breakdown     Problem: Musculoskeletal - Adult  Goal: Maintain proper alignment of affected body part  Outcome: Progressing     Problem: Gastrointestinal - Adult  Goal: Minimal or absence of nausea and vomiting  Outcome: Progressing  Goal: Maintains or returns to baseline bowel function  Outcome: Progressing  Goal: Maintains adequate nutritional intake  Outcome: Progressing     Problem: Genitourinary - Adult  Goal: Absence of urinary retention  Outcome: Progressing     Problem: Infection - Adult  Goal: Absence of infection at discharge  Outcome: Progressing  Goal: Absence of infection during hospitalization  Outcome: Progressing     Problem: Metabolic/Fluid and Electrolytes - Adult  Goal: Electrolytes maintained within normal limits  Outcome: Progressing     Problem: Hematologic - Adult  Goal: Maintains hematologic stability  Outcome: Progressing

## 2025-07-24 NOTE — PROGRESS NOTES
Pulse oximetry assessment   87% at rest on room air (if 88% or less, skip next steps)  NA% while ambulating on room air  93% at rest on 4 LPM  92% while ambulating on 4 LPM

## 2025-07-24 NOTE — PROCEDURES
ADULT PROTOCOL: JET AEROSOL ASSESSMENT    Patient  Ranulfo Hernandez     70 y.o.   male     7/24/2025  3:09 PM    Breath Sounds Pre Procedure: Breath Sounds Pre-Tx BREA: Diminished, End expiratory wheezes                                  Breath Sounds Pre-Tx LLL: Diminished, End expiratory wheezes        Breath Sounds Pre-Tx RUL: Diminished, End Expiratory wheezes        Breath Sounds Pre-Tx RML: Diminished, End expiratory wheezes        Breath Sounds Pre-Tx RLL: Diminished, End expiratory wheezes  Breath Sounds Post Procedure: Breath Sounds Post-Tx BREA: Diminished          Breath Sounds Post-Tx LLL: Diminished          Breath Sounds Post-Tx RUL: Diminished          Breath Sounds Post-Tx RML: Diminished          Breath Sounds Post-Tx RLL: Diminished                                     Breathing pattern: Pre procedure Regular          Post procedure Regular    Heart Rate: Pre procedure Pre-Tx Pulse: 69           Post procedure Post-Tx Pulse: 66    Resp Rate: Pre procedure Pre-Tx Resps: 18           Post procedure Post-Tx Resps: 16        Oxygen: O2 Therapy: Oxygen   4LNC          SpO2:  SpO2: 93 %                   Nebulizer Therapy: Current medications Medications: Ipratropium Bromide,      Changed: NO      Smoking his. 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

## 2025-07-24 NOTE — PROGRESS NOTES
Emergency Department    [address] [facility] [address]    Phone:  [phone]    Fax:  [phone]                                       Slade Patten   MRN: [id number]    Department:   Emergency Department   Date of Visit:  12/20/2017           Patient Information     Date Of Birth          2014        Your diagnoses for this visit were:     Closed head injury, initial encounter     Laceration of scalp, initial encounter        You were seen by Arturo Ervin MD.      Follow-up Information     Follow up with  Emergency Department.    Specialty:  EMERGENCY MEDICINE    Why:  As needed    Contact information:    [address] [facility] [address] [phone]      Discharge References/Attachments     HEAD INJURY, NO WAKE-UP (CHILD) (ENGLISH)    LACERATION, SMALL: NOT SUTURED (CHILD) (ENGLISH)      24 Hour Appointment Hotline       To make an appointment at any [facility], call [phone] ([phone]). If you don't have a family doctor or clinic, we will help you find one. [facility] clinics are conveniently located to serve the needs of you and your family.             Review of your medicines      Notice     You have not been prescribed any medications.            Orders Needing Specimen Collection     None      Pending Results     No orders found from 12/18/2017 to 12/21/2017.            Pending Culture Results     No orders found from 12/18/2017 to 12/21/2017.            Pending Results Instructions     If you had any lab results that were not finalized at the time of your Discharge, you can call the ED Lab Result RN at [phone]. You will be contacted by this team for any positive Lab results or changes in treatment. The nurses are available 7 days a week from 10A to 6:30P.  You can leave a message 24 hours per day and they will return your call.        Test Results From Your Hospital Stay               Thank you for choosing [facility]      PHYSICAL THERAPY EVALUATION/DISCHARGE    Patient: Ranulfo Hernandez (70 y.o. male)  Date: 7/24/2025  Primary Diagnosis: Shortness of breath [R06.02]  Paroxysmal atrial fibrillation (HCC) [I48.0]  A-fib (HCC) [I48.91]  COPD exacerbation (HCC) [J44.1]  Atrial fibrillation with rapid ventricular response (HCC) [I48.91]       Precautions:              ASSESSMENT AND RECOMMENDATIONS:  Based on the objective data below, the patient presented on 7/21 with SOB due to afib and COPD exacerbation. Pt mobilized at independent-supervision level for bed mobility, functional transfers and gait training without AD. Pt with minor trunk sway during gait training, but no overt LOB noted. Pt with history of R great toe amputation resulting in new baseline balance deficits. SpO2 monitored during gait training on room air and spO2: 90-91%.  Pt reported he started smoking when he was 4 y.o.  Pt educated on importance of remaining OOB and in an upright position for optimal lung expansion. Pt appears to be mobilizing at baseline mobility status with no further acute PT needs.    Functional Outcome Measure:  The patient scored 24/24 on the [facility] mobility outcome measure which is indicative of not needing additional therapy.          Further skilled acute physical therapy is not indicated at this time.       PLAN :  Recommendation for discharge: (in order for the patient to meet his/her long term goals):   No skilled physical therapy    Other factors to consider for discharge: no additional factors    IF patient discharges home will need the following DME: none       SUBJECTIVE:   Patient stated “My first cigarette was when I was 4 years old.”    OBJECTIVE DATA SUMMARY:     Past Medical History:   Diagnosis Date    Cancer (HCC)     PROSTATE    Diverticulitis     Hyperthyroidism 5/15/2019    Psychiatric disorder     ANXIETY (SITUATIONAL)     Past Surgical History:   Procedure Laterality Date    GI  2003    18 \" COLON RESECTION    Thank you for choosing [facility] for your care. Our goal is always to provide you with excellent care. Hearing back from our patients is one way we can continue to improve our services. Please take a few minutes to complete the written survey that you may receive in the mail after you visit with us. Thank you!        [employer]hart Information     [facility] lets you send messages to your doctor, view your test results, renew your prescriptions, schedule appointments and more. To sign up, go to [email or web address], contact your [facility] clinic or call [phone] during business hours.            Care EveryWhere ID     This is your Care EveryWhere ID. This could be used by other organizations to access your [facility] medical records  [id number]        Equal Access to Services     YUE ROMANO : Xuan Gonzalez, aury monet, nito scott, annamaria hickman. So [facility] [phone].    ATENCIÓN: Si habla español, tiene a castillo disposición servicios gratuitos de asistencia lingüística. Llame al [phone].    We comply with applicable federal civil rights laws and [place] laws. We do not discriminate on the basis of race, color, national origin, age, disability, sex, sexual orientation, or gender identity.            After Visit Summary       This is your record. Keep this with you and show to your community pharmacist(s) and doctor(s) at your next visit.

## 2025-07-24 NOTE — PROGRESS NOTES
End of Shift Note    Bedside shift change report given to Cristal NARANJO (oncoming nurse) by Nadine Zeng, RN (offgoing nurse).  Report included the following information SBAR, MAR, and Cardiac Rhythm Sinus rhythm    Shift worked:  7a-7.30p     Shift summary and any significant changes:     -Patient O2 demand increased, now on 4 LPM.     Concerns for physician to address:       Zone phone for oncoming shift:          Activity:  Level of Assistance: Standby assist, set-up cues, supervision of patient - no hands on  Number times ambulated in hallways past shift: 0  Number of times OOB to chair past shift: 1    Cardiac:   Cardiac Monitoring: Yes      Cardiac Rhythm: Sinus rhythm    Access:  Current line(s): PIV     Genitourinary:   Urinary Status: Voiding    Respiratory:   O2 Device: Nasal cannula  Chronic home O2 use?: NO  Incentive spirometer at bedside: YES    GI:  Last BM (including prior to admit): 07/21/25 (As per patient)  Current diet:  DIET ONE TIME MESSAGE;  ADULT DIET; Regular  DIET ONE TIME MESSAGE;  Passing flatus: YES    Pain Management:   Patient states pain is manageable on current regimen: YES    Skin:  Dioni Scale Score: 18  Interventions: Wound Offloading (Prevention Methods): Pillows, Repositioning, Elevate heels, Turning    Patient Safety:  Fall Risk: Nursing Judgement-Fall Risk High(Add Comments): Yes  Fall Risk Interventions  Nursing Judgement-Fall Risk High(Add Comments): Yes  Toilet Every 2 Hours-In Advance of Need: Yes  Hourly Visual Checks: Awake, In bed  Fall Visual Posted: Fall sign posted, Armband  Room Door Open: Yes  Alarm On: Bed  Patient Moved Closer to Nursing Station: No    Active Consults:   IP CONSULT TO CARDIOLOGY  IP CONSULT TO PULMONOLOGY  IP CONSULT TO SOCIAL WORK    Length of Stay:  Expected LOS: 5  Actual LOS: 3    Nadine Zeng, RN

## 2025-07-24 NOTE — PROGRESS NOTES
Bedside, Verbal, and Written shift change report given to Barrett NARANJO (oncoming nurse) by Johan NARANJO (offgoing nurse). Report included the following information Nurse Handoff Report, Cardiac Rhythm normal sinus, Quality Measures, and Neuro Assessment.      End of Shift Note    Bedside shift change report given to Nadine NARANJO (oncoming nurse) by Johan Diez RN (offgoing nurse).  Report included the following information SBAR, Cardiac Rhythm sinus rhythm, Alarm Parameters , and Quality Measures    Shift worked:  7pm-7am     Shift summary and any significant changes:    none     Concerns for physician to address: none     Zone phone for oncoming shift:  none       Activity:  Level of Assistance: Standby assist, set-up cues, supervision of patient - no hands on  Number times ambulated in hallways past shift: 0  Number of times OOB to chair past shift: 0    Cardiac:   Cardiac Monitoring: Yes      Cardiac Rhythm: Sinus rhythm    Access:  Current line(s): PIV    Genitourinary:   Urinary Status: Voiding    Respiratory:   O2 Device: Nasal cannula  Chronic home O2 use?: NO  Incentive spirometer at bedside: NO    GI:  Last BM (including prior to admit): 07/21/25  Current diet:  DIET ONE TIME MESSAGE;  ADULT DIET; Regular  DIET ONE TIME MESSAGE;  Passing flatus: YES    Pain Management:   Patient states pain is manageable on current regimen: NO    Skin:  Dioni Scale Score: 21  Interventions: Wound Offloading (Prevention Methods): Bed, pressure redistribution/air, Bed, pressure reduction mattress, Repositioning, Wedge pillows    Patient Safety:  Fall Risk: Nursing Judgement-Fall Risk High(Add Comments): Yes  Fall Risk Interventions  Nursing Judgement-Fall Risk High(Add Comments): Yes  Toilet Every 2 Hours-In Advance of Need: Yes  Hourly Visual Checks: Awake, In bed  Fall Visual Posted: Fall sign posted  Room Door Open: Yes  Alarm On: Bed  Patient Moved Closer to Nursing Station: No    Active Consults:   IP CONSULT TO

## 2025-07-25 LAB
ANION GAP SERPL CALC-SCNC: 3 MMOL/L (ref 2–12)
BASOPHILS # BLD: 0.01 K/UL (ref 0–0.1)
BASOPHILS NFR BLD: 0.1 % (ref 0–1)
BUN SERPL-MCNC: 25 MG/DL (ref 6–20)
BUN/CREAT SERPL: 28 (ref 12–20)
CALCIUM SERPL-MCNC: 9.2 MG/DL (ref 8.5–10.1)
CHLORIDE SERPL-SCNC: 103 MMOL/L (ref 97–108)
CO2 SERPL-SCNC: 31 MMOL/L (ref 21–32)
CREAT SERPL-MCNC: 0.9 MG/DL (ref 0.7–1.3)
DIFFERENTIAL METHOD BLD: ABNORMAL
EOSINOPHIL # BLD: 0 K/UL (ref 0–0.4)
EOSINOPHIL NFR BLD: 0 % (ref 0–7)
ERYTHROCYTE [DISTWIDTH] IN BLOOD BY AUTOMATED COUNT: 14.3 % (ref 11.5–14.5)
GLUCOSE SERPL-MCNC: 194 MG/DL (ref 65–100)
HCT VFR BLD AUTO: 48.4 % (ref 36.6–50.3)
HGB BLD-MCNC: 15.8 G/DL (ref 12.1–17)
IMM GRANULOCYTES # BLD AUTO: 0.18 K/UL (ref 0–0.04)
IMM GRANULOCYTES NFR BLD AUTO: 1.2 % (ref 0–0.5)
LYMPHOCYTES # BLD: 0.79 K/UL (ref 0.8–3.5)
LYMPHOCYTES NFR BLD: 5.4 % (ref 12–49)
MCH RBC QN AUTO: 32 PG (ref 26–34)
MCHC RBC AUTO-ENTMCNC: 32.6 G/DL (ref 30–36.5)
MCV RBC AUTO: 98 FL (ref 80–99)
MONOCYTES # BLD: 0.57 K/UL (ref 0–1)
MONOCYTES NFR BLD: 3.9 % (ref 5–13)
NEUTS SEG # BLD: 13.15 K/UL (ref 1.8–8)
NEUTS SEG NFR BLD: 89.4 % (ref 32–75)
NRBC # BLD: 0 K/UL (ref 0–0.01)
NRBC BLD-RTO: 0 PER 100 WBC
PLATELET # BLD AUTO: 203 K/UL (ref 150–400)
PMV BLD AUTO: 9.8 FL (ref 8.9–12.9)
POTASSIUM SERPL-SCNC: 4.4 MMOL/L (ref 3.5–5.1)
RBC # BLD AUTO: 4.94 M/UL (ref 4.1–5.7)
RBC MORPH BLD: ABNORMAL
SODIUM SERPL-SCNC: 137 MMOL/L (ref 136–145)
WBC # BLD AUTO: 14.7 K/UL (ref 4.1–11.1)

## 2025-07-25 PROCEDURE — 6360000002 HC RX W HCPCS: Performed by: INTERNAL MEDICINE

## 2025-07-25 PROCEDURE — 6370000000 HC RX 637 (ALT 250 FOR IP): Performed by: STUDENT IN AN ORGANIZED HEALTH CARE EDUCATION/TRAINING PROGRAM

## 2025-07-25 PROCEDURE — 6360000002 HC RX W HCPCS

## 2025-07-25 PROCEDURE — 94640 AIRWAY INHALATION TREATMENT: CPT

## 2025-07-25 PROCEDURE — 6370000000 HC RX 637 (ALT 250 FOR IP)

## 2025-07-25 PROCEDURE — 2060000000 HC ICU INTERMEDIATE R&B

## 2025-07-25 PROCEDURE — 6370000000 HC RX 637 (ALT 250 FOR IP): Performed by: PHYSICIAN ASSISTANT

## 2025-07-25 PROCEDURE — 2500000003 HC RX 250 WO HCPCS: Performed by: INTERNAL MEDICINE

## 2025-07-25 PROCEDURE — 94669 MECHANICAL CHEST WALL OSCILL: CPT

## 2025-07-25 PROCEDURE — 80048 BASIC METABOLIC PNL TOTAL CA: CPT

## 2025-07-25 PROCEDURE — 2500000003 HC RX 250 WO HCPCS: Performed by: STUDENT IN AN ORGANIZED HEALTH CARE EDUCATION/TRAINING PROGRAM

## 2025-07-25 PROCEDURE — 2700000000 HC OXYGEN THERAPY PER DAY

## 2025-07-25 PROCEDURE — 36415 COLL VENOUS BLD VENIPUNCTURE: CPT

## 2025-07-25 PROCEDURE — 6370000000 HC RX 637 (ALT 250 FOR IP): Performed by: INTERNAL MEDICINE

## 2025-07-25 PROCEDURE — 85025 COMPLETE CBC W/AUTO DIFF WBC: CPT

## 2025-07-25 RX ORDER — CALCIUM CARBONATE 500 MG/1
500 TABLET, CHEWABLE ORAL 3 TIMES DAILY PRN
Status: DISCONTINUED | OUTPATIENT
Start: 2025-07-25 | End: 2025-07-26 | Stop reason: HOSPADM

## 2025-07-25 RX ADMIN — OMEGA-3-ACID ETHYL ESTERS CAPSULES 1000 MG: 1 CAPSULE, LIQUID FILLED ORAL at 08:24

## 2025-07-25 RX ADMIN — Medication 1000 UNITS: at 08:25

## 2025-07-25 RX ADMIN — DILTIAZEM HYDROCHLORIDE 60 MG: 60 TABLET, FILM COATED ORAL at 20:18

## 2025-07-25 RX ADMIN — METHYLPREDNISOLONE SODIUM SUCCINATE 40 MG: 40 INJECTION, POWDER, LYOPHILIZED, FOR SOLUTION INTRAMUSCULAR; INTRAVENOUS at 08:25

## 2025-07-25 RX ADMIN — ROSUVASTATIN CALCIUM 10 MG: 10 TABLET, FILM COATED ORAL at 20:20

## 2025-07-25 RX ADMIN — SODIUM CHLORIDE, PRESERVATIVE FREE 10 ML: 5 INJECTION INTRAVENOUS at 08:26

## 2025-07-25 RX ADMIN — ARFORMOTEROL TARTRATE: 15 SOLUTION RESPIRATORY (INHALATION) at 19:48

## 2025-07-25 RX ADMIN — ASPIRIN 325 MG: 325 TABLET ORAL at 08:25

## 2025-07-25 RX ADMIN — IPRATROPIUM BROMIDE 0.5 MG: 0.5 SOLUTION RESPIRATORY (INHALATION) at 07:09

## 2025-07-25 RX ADMIN — HYDROCHLOROTHIAZIDE 25 MG: 25 TABLET ORAL at 08:24

## 2025-07-25 RX ADMIN — Medication 100 MG: at 08:25

## 2025-07-25 RX ADMIN — SODIUM CHLORIDE, PRESERVATIVE FREE 10 ML: 5 INJECTION INTRAVENOUS at 20:21

## 2025-07-25 RX ADMIN — DILTIAZEM HYDROCHLORIDE 60 MG: 60 TABLET, FILM COATED ORAL at 15:09

## 2025-07-25 RX ADMIN — IPRATROPIUM BROMIDE 0.5 MG: 0.5 SOLUTION RESPIRATORY (INHALATION) at 13:19

## 2025-07-25 RX ADMIN — ARFORMOTEROL TARTRATE: 15 SOLUTION RESPIRATORY (INHALATION) at 07:09

## 2025-07-25 RX ADMIN — APIXABAN 5 MG: 5 TABLET, FILM COATED ORAL at 20:39

## 2025-07-25 RX ADMIN — OXYCODONE HYDROCHLORIDE AND ACETAMINOPHEN 500 MG: 500 TABLET ORAL at 08:24

## 2025-07-25 RX ADMIN — GUAIFENESIN 1200 MG: 600 TABLET, EXTENDED RELEASE ORAL at 08:24

## 2025-07-25 RX ADMIN — GUAIFENESIN 1200 MG: 600 TABLET, EXTENDED RELEASE ORAL at 20:28

## 2025-07-25 RX ADMIN — ZINC SULFATE 220 MG (50 MG) CAPSULE 50 MG: CAPSULE at 08:24

## 2025-07-25 RX ADMIN — DILTIAZEM HYDROCHLORIDE 60 MG: 60 TABLET, FILM COATED ORAL at 06:40

## 2025-07-25 RX ADMIN — CALCIUM CARBONATE (ANTACID) CHEW TAB 500 MG 500 MG: 500 CHEW TAB at 20:19

## 2025-07-25 RX ADMIN — APIXABAN 5 MG: 5 TABLET, FILM COATED ORAL at 08:26

## 2025-07-25 RX ADMIN — FOLIC ACID 1000 MCG: 1 TABLET ORAL at 08:24

## 2025-07-25 RX ADMIN — IPRATROPIUM BROMIDE 0.5 MG: 0.5 SOLUTION RESPIRATORY (INHALATION) at 19:43

## 2025-07-25 RX ADMIN — METHYLPREDNISOLONE SODIUM SUCCINATE 40 MG: 40 INJECTION, POWDER, LYOPHILIZED, FOR SOLUTION INTRAMUSCULAR; INTRAVENOUS at 20:20

## 2025-07-25 NOTE — RT PROTOCOL NOTE
ADULT PROTOCOL: JET AEROSOL ASSESSMENT    Patient  Ranulfo Hernandez     70 y.o.   male     7/25/2025  4:04 PM    Breath Sounds Pre Procedure: Breath Sounds Pre-Tx BREA: Expiratory wheezes                                  Breath Sounds Pre-Tx LLL: Expiratory wheezes        Breath Sounds Pre-Tx RUL: Expiratory wheezes        Breath Sounds Pre-Tx RML: Expiratory wheezes        Breath Sounds Pre-Tx RLL: Expiratory wheezes  Breath Sounds Post Procedure: Breath Sounds Post-Tx BREA: Expiratory wheezes          Breath Sounds Post-Tx LLL: Expiratory wheezes          Breath Sounds Post-Tx RUL: Expiratory wheezes          Breath Sounds Post-Tx RML: Expiratory wheezes          Breath Sounds Post-Tx RLL: Expiratory wheezes                                     Breathing pattern: Pre procedure  Tachypnea          Post procedure   Regular    Heart Rate: Pre procedure Pre-Tx Pulse: 80           Post procedure Post-Tx Pulse: 70    Resp Rate: Pre procedure Pre-Tx Resps: 24           Post procedure Post-Tx Resps: 17      Oxygen: O2 Therapy: Oxygen   2L NC         SpO2:  92%                Nebulizer Therapy: Current medications Medications: Ipratropium Bromide      Changed: Yes to Tid    Smoking History:  Everyday    Problem List:   Patient Active Problem List   Diagnosis    COPD exacerbation (HCC)    Hyperthyroidism    Hyperlipidemia    Prostate cancer (HCC)    HTN (hypertension)    Atrial fibrillation with rapid ventricular response (HCC)    Osteomyelitis (HCC)    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

## 2025-07-25 NOTE — PROGRESS NOTES
End of Shift Note    Bedside shift change report given to JOSE A Coe (oncoming nurse) by Chanell Vance RN (offgoing nurse).  Report included the following information SBAR    Shift worked:  7a-7p     Shift summary and any significant changes:     Continues to wean O2     Concerns for physician to address:       Zone phone for oncoming shift:   [phone]       Activity:  Level of Assistance: Independent  Number times ambulated in hallways past shift: 0  Number of times OOB to chair past shift: 0    Cardiac:   Cardiac Monitoring: Yes      Cardiac Rhythm: Sinus rhythm    Access:  Current line(s): PIV     Genitourinary:   Urinary Status: Voiding (urinal)    Respiratory:   O2 Device: Nasal cannula  Chronic home O2 use?: NO  Incentive spirometer at bedside: YES    GI:  Last BM (including prior to admit): 07/20/25  Current diet:  DIET ONE TIME MESSAGE;  ADULT DIET; Regular  DIET ONE TIME MESSAGE;  Passing flatus: YES    Pain Management:   Patient states pain is manageable on current regimen: YES    Skin:  Dioni Scale Score: 20  Interventions: Wound Offloading (Prevention Methods): Repositioning    Patient Safety:  Fall Risk: Nursing Judgement-Fall Risk High(Add Comments): No  Fall Risk Interventions  Nursing Judgement-Fall Risk High(Add Comments): No  Toilet Every 2 Hours-In Advance of Need: Yes  Hourly Visual Checks: In bed  Fall Visual Posted: Socks  Room Door Open: Yes  Alarm On: Bed  Patient Moved Closer to Nursing Station: No    Active Consults:   IP CONSULT TO CARDIOLOGY  IP CONSULT TO PULMONOLOGY  IP CONSULT TO SOCIAL WORK    Length of Stay:  Expected LOS: 6  Actual LOS: 4    Chanell Vance RN

## 2025-07-25 NOTE — PROGRESS NOTES
Pulmonary, Critical Care, and Sleep Medicine~Consult Note    Name: Ranulfo Hernandez MRN: [id number]   : 1955 Hospital: [facility]   Date: 2025 10:17 AM Admission: 2025     Impression Plan   Suspected COPD, + acute exacerbation  Afib RVR, s/p cardioversion in 2019, not currently on anticoagulation  Alcohol Use Disorder  Hx Prostate Cancer s/p prostatectomy  Hyperthyroidism  HTN  Osteomyelitis  Tobacco Use Disorder  ETOH use   Recurrent Falls  Peripheral Eosinophilia  Wean O2 as tolerated for sats >88%. Repeat exercise oximetry today  scheduled bronchodilators (avoiding albuterol use with afib), recommend discharge with Trelegy 100 or Breztri, previously on Anoro   Levalbuterol PRN  Completed azithro for COPD exacerbation  Continue IVCS. Transition to prednisone taper on discharge--would send on a 10-12 day taper  mucinex, flutter valve  Smoking Cessation - wants to continue Nicotine patches  Eliquis  Fall Precautions  CIWA  Would qualify for LDCT on discharge      Recommend follow up in the OP setting for PFTs and further evaluation and management of pulmonary disease.    Will see prn over the weekend        Daily Progression:    : feeling well. Cough productive of white sputum. I weaned O2 to 2L.    : discharge cancelled due to increased O2 requirement--now on 4L which I was able to wean to 3L  He is disappointed he's not going home  Says his breathing is improving  Cough productive of clear, white, or yellow sputum    : feeling much better today. Shortness of breath and wheeze are improving. He walked the halls today and O2 sats remained in the 90s per nurse.  On my eval he is on RA satting 87-90%    : Reports feeling significantly better since admission, wheeze is improving. Has not been out of bed yet but plans to today. On room air currently.     CONSULT NOTE:   Consulted by hospitalist service for \"COPD exacerbation, not on appropriate meds.\"    HEENT:  NCAT    Chest: No pectus deformity, normal chest rise b/l    HEART:  RRR, no murmurs    Lungs:  Bilateral mild wheeze, few faint rhonchi, no rales.     ABD: Soft/NT    EXT: No cyanosis/clubbing/edema    Skin: No rashes     Neuro: A/O x 3        Medications:  Current Facility-Administered Medications   Medication Dose Route Frequency    ipratropium (ATROVENT) 0.02 % nebulizer solution 0.5 mg  0.5 mg Nebulization 4x Daily RT    methylPREDNISolone sodium succ (SOLU-MEDROL) 40 mg in sterile water 1 mL injection  40 mg IntraVENous Q12H    guaiFENesin (MUCINEX) extended release tablet 1,200 mg  1,200 mg Oral BID    levalbuterol (XOPENEX) nebulization 0.63 mg  0.63 mg Nebulization Q6H PRN    LORazepam (ATIVAN) tablet 1 mg  1 mg Oral Q1H PRN    Or    diazePAM (VALIUM) injection 5 mg  5 mg IntraVENous Q4H PRN    Or    LORazepam (ATIVAN) tablet 2 mg  2 mg Oral Q1H PRN    Or    diazePAM (VALIUM) injection 10 mg  10 mg IntraVENous Q4H PRN    Or    LORazepam (ATIVAN) tablet 3 mg  3 mg Oral Q1H PRN    Or    diazePAM (VALIUM) injection 15 mg  15 mg IntraVENous Q4H PRN    Or    LORazepam (ATIVAN) tablet 4 mg  4 mg Oral Q1H PRN    Or    diazePAM (VALIUM) injection 20 mg  20 mg IntraVENous Q4H PRN    ascorbic acid (VITAMIN C) tablet 500 mg  500 mg Oral Daily    aspirin tablet 325 mg  325 mg Oral Daily    folic acid (FOLVITE) tablet 1,000 mcg  1,000 mcg Oral Daily    hydroCHLOROthiazide (HYDRODIURIL) tablet 25 mg  25 mg Oral Daily    nicotine (NICODERM CQ) 21 MG/24HR 1 patch  1 patch TransDERmal Q24H    omega-3 acid ethyl esters (LOVAZA) capsule 1,000 mg  1,000 mg Oral Daily    rosuvastatin (CRESTOR) tablet 10 mg  10 mg Oral Nightly    Vitamin D (CHOLECALCIFEROL) tablet 1,000 Units  1,000 Units Oral Daily    zinc sulfate (ZINCATE) 220 mg capsule - elemental zinc 50 mg  50 mg Oral Daily    dilTIAZem (CARDIZEM) immediate release tablet 60 mg  60 mg Oral 3 times per day    apixaban (ELIQUIS) tablet 5 mg  5 mg Oral BID    sodium

## 2025-07-25 NOTE — PLAN OF CARE
Problem: Neurosensory - Adult  Goal: Achieves stable or improved neurological status  Outcome: Progressing  Goal: Absence of seizures  Outcome: Progressing  Goal: Achieves maximal functionality and self care  Outcome: Progressing     Problem: Respiratory - Adult  Goal: Achieves optimal ventilation and oxygenation  7/25/2025 0402 by Jacek Singh RN  Outcome: Progressing  7/24/2025 2358 by Juanita Cooley RCP  Outcome: Progressing     Problem: Cardiovascular - Adult  Goal: Maintains optimal cardiac output and hemodynamic stability  Outcome: Progressing  Goal: Absence of cardiac dysrhythmias or at baseline  Outcome: Progressing     Problem: Skin/Tissue Integrity - Adult  Goal: Skin integrity remains intact  Outcome: Progressing     Problem: Musculoskeletal - Adult  Goal: Maintain proper alignment of affected body part  Outcome: Progressing     Problem: Gastrointestinal - Adult  Goal: Minimal or absence of nausea and vomiting  Outcome: Progressing  Goal: Maintains or returns to baseline bowel function  Outcome: Progressing  Goal: Maintains adequate nutritional intake  Outcome: Progressing     Problem: Genitourinary - Adult  Goal: Absence of urinary retention  Outcome: Progressing     Problem: Infection - Adult  Goal: Absence of infection at discharge  Outcome: Progressing  Goal: Absence of infection during hospitalization  Outcome: Progressing     Problem: Metabolic/Fluid and Electrolytes - Adult  Goal: Electrolytes maintained within normal limits  Outcome: Progressing  Goal: Hemodynamic stability and optimal renal function maintained  Outcome: Progressing     Problem: Hematologic - Adult  Goal: Maintains hematologic stability  Outcome: Progressing

## 2025-07-25 NOTE — PROGRESS NOTES
Bedside shift change report given to JOSE A Lua (oncoming nurse) by JOSE A Read (offgoing nurse). Report included the following information Nurse Handoff Report.

## 2025-07-25 NOTE — PROGRESS NOTES
Hospital follow-up PCP transitional care appointment has been scheduled with Dr. Kim Trinh on 7/31/2025 at 1330. This is the first available appt due to limited provider availability. PCP office does not offer alternate provider option for hospital follow up. Pending patient discharge. Cori Grande, Care Management Assistant

## 2025-07-25 NOTE — PROGRESS NOTES
Spiritual Health History and Assessment/Progress Note  [facility]    Attempted Encounter,  ,  ,      Name: Ranulfo Hernandez MRN: [id number]    Age: 70 y.o.     Sex: male   Language: English   Sikh: None   A-fib ([facility])     Date: 7/25/2025            Total Time Calculated: 6 min              Spiritual Assessment began in [facility] 2 PROGRESSIVE CARE        Referral/Consult From: Rounding   Encounter Overview/Reason: Attempted Encounter  Service Provided For: Patient not available    Brooke, Belief, Meaning:   Patient unable to assess at this time  Family/Friends No family/friends present      Importance and Influence:  Patient unable to assess at this time  Family/Friends No family/friends present    Community:  Patient feels well-supported. Support system includes: Spouse/Partner and Children  Family/Friends No family/friends present    Assessment and Plan of Care:     Patient Interventions include: Other: N/A; pt. asleep  Family/Friends Interventions include: No family/friends present    Patient Plan of Care: Spiritual Care available upon further referral  Family/Friends Plan of Care: Spiritual Care available upon further referral    Electronically signed by Ivania Hamilton,  Intern on 7/25/2025 at 3:01 PM

## 2025-07-25 NOTE — PROGRESS NOTES
End of Shift Note    Bedside shift change report given to Chanell (oncoming nurse) by Renato Singh RN (offgoing nurse).  Report included the following information SBAR, MAR, Recent Results, and Cardiac Rhythm NSR    Shift worked:  4898-2305     Shift summary and any significant changes:     PRN glycolax given, pt had small BM this morning. No further changes.      Concerns for physician to address:    Zone phone for oncoming shift:       Activity:  Level of Assistance: Standby assist, set-up cues, supervision of patient - no hands on  Number times ambulated in hallways past shift: 0  Number of times OOB to chair past shift: 0    Cardiac:   Cardiac Monitoring: Yes      Cardiac Rhythm: Sinus rhythm    Access:  Current line(s): PIV     Genitourinary:   Urinary Status: Voiding    Respiratory:   O2 Device: Nasal cannula  Chronic home O2 use?: NO  Incentive spirometer at bedside: YES    GI:  Last BM (including prior to admit): 07/20/25  Current diet:  DIET ONE TIME MESSAGE;  ADULT DIET; Regular  DIET ONE TIME MESSAGE;  Passing flatus: YES    Pain Management:   Patient states pain is manageable on current regimen: N/A    Skin:  Dioni Scale Score: 19  Interventions: Wound Offloading (Prevention Methods): Elevate heels, Pillows, Repositioning    Patient Safety:  Fall Risk: Nursing Judgement-Fall Risk High(Add Comments): Yes  Fall Risk Interventions  Nursing Judgement-Fall Risk High(Add Comments): Yes  Toilet Every 2 Hours-In Advance of Need: Yes  Hourly Visual Checks: Awake, In bed  Fall Visual Posted: Socks  Room Door Open: Yes  Alarm On: Bed  Patient Moved Closer to Nursing Station: No    Active Consults:   IP CONSULT TO CARDIOLOGY  IP CONSULT TO PULMONOLOGY  IP CONSULT TO SOCIAL WORK    Length of Stay:  Expected LOS: 5  Actual LOS: 4    Renato Singh, RN

## 2025-07-25 NOTE — PLAN OF CARE
Problem: Neurosensory - Adult  Goal: Achieves stable or improved neurological status  7/25/2025 1444 by Donovan Vance RN  Outcome: Progressing  7/25/2025 0402 by Jacek Singh RN  Outcome: Progressing  Goal: Absence of seizures  7/25/2025 1444 by Donovan Vance RN  Outcome: Progressing  7/25/2025 0402 by Jacek Singh RN  Outcome: Progressing  Goal: Achieves maximal functionality and self care  7/25/2025 1444 by Donovan Vance RN  Outcome: Progressing  7/25/2025 0402 by Jacek Singh RN  Outcome: Progressing     Problem: Respiratory - Adult  Goal: Achieves optimal ventilation and oxygenation  7/25/2025 0713 by Belkis Reynolds RCP  Outcome: Progressing  7/25/2025 0402 by Jacek Singh RN  Outcome: Progressing     Problem: Cardiovascular - Adult  Goal: Maintains optimal cardiac output and hemodynamic stability  7/25/2025 1444 by Donovan Vance RN  Outcome: Progressing  7/25/2025 0402 by Jacek Singh RN  Outcome: Progressing  Goal: Absence of cardiac dysrhythmias or at baseline  7/25/2025 1444 by Donovan Vance RN  Outcome: Progressing  7/25/2025 0402 by Jacek Singh RN  Outcome: Progressing     Problem: Skin/Tissue Integrity - Adult  Goal: Skin integrity remains intact  7/25/2025 1444 by Donovan Vance RN  Outcome: Progressing  7/25/2025 0402 by Jacek Singh RN  Outcome: Progressing     Problem: Musculoskeletal - Adult  Goal: Maintain proper alignment of affected body part  7/25/2025 1444 by Donovan Vance RN  Outcome: Progressing  7/25/2025 0402 by Jacek Singh RN  Outcome: Progressing     Problem: Gastrointestinal - Adult  Goal: Minimal or absence of nausea and vomiting  7/25/2025 1444 by Donovan Vance RN  Outcome: Progressing  7/25/2025 0402 by Jacek Singh RN  Outcome: Progressing  Goal: Maintains or returns to baseline bowel function  7/25/2025 1444 by Vance, Naquisha, RN  Outcome: Progressing  7/25/2025 0402 by Jacek Singh,  RN  Outcome: Progressing  Goal: Maintains adequate nutritional intake  7/25/2025 1444 by Donovan Vance RN  Outcome: Progressing  7/25/2025 0402 by Jacek Singh RN  Outcome: Progressing     Problem: Genitourinary - Adult  Goal: Absence of urinary retention  7/25/2025 1444 by Donovan Vance RN  Outcome: Progressing  7/25/2025 0402 by Jacek Singh RN  Outcome: Progressing     Problem: Infection - Adult  Goal: Absence of infection at discharge  7/25/2025 0402 by Jacek Singh RN  Outcome: Progressing  Goal: Absence of infection during hospitalization  7/25/2025 0402 by Jacek Singh RN  Outcome: Progressing     Problem: Metabolic/Fluid and Electrolytes - Adult  Goal: Electrolytes maintained within normal limits  7/25/2025 0402 by Jacek Singh RN  Outcome: Progressing  Goal: Hemodynamic stability and optimal renal function maintained  7/25/2025 0402 by Jacek Singh RN  Outcome: Progressing     Problem: Hematologic - Adult  Goal: Maintains hematologic stability  7/25/2025 0402 by Jacek Singh RN  Outcome: Progressing

## 2025-07-25 NOTE — PROGRESS NOTES
Hospitalist Progress Note    NAME:   Ranulfo Hernandez   : 1955   MRN: [id number]     Date/Time: 2025 4:34 PM  Patient PCP: Kim Trinh MD    Estimated discharge date:  Barriers:       Assessment / Plan:    Acute COPD exacerbation  - Chest x-ray shows no acute process.  proBNP is 400.  - Chest x-ray shows no acute process  Completed azithromycin x 3 days  Wean down Solu-Medrol to 40 mg every 12 hours  Continue nebs increased to 4 times daily  Exercise pulse oximetry before discharge  Patient desaturated and currently on 3L nasal cannula  Added Mucinex and flutter valve by pulmonology  Appreciate pulm recs and ready for discharge taper 10 to 12 days  Will need neb machine at the time of discharge    Atrial fibrillation with rapid ventricular rate  - Status post Cardizem drip.  Switch to Eliquis.  Switch to oral Cardizem 60 mg every 8 hours  Cleared from cardiology standpoint for discharge    Chronic alcoholism with concern for alcohol withdrawal  - Continue thiamine and folic acid.  Continue CIWA protocol with Ativan  Not in active withdrawal    Hx prostate cancer s/p prostatectomy  Hyperthyroidism  HLD  HTN  - TSH is currently normal  - Continue Norvasc, statin    Tobacco use  On nicotine      Medical Decision Making:   I personally reviewed labs: CBC, BMP  I personally reviewed imaging:   I personally reviewed EKG: Telemetry  Toxic drug monitoring: Monitor blood sugar while on Solu-Medrol  Discussed case with: Patient RN case management, IDR  Pulmonology NP Melissa      Code Status: Full code  DVT Prophylaxis: Lovenox  GI Prophylaxis:    Subjective:     Chief Complaint / Reason for Physician Visit  Patient still on 3 L of nasal cannula, today seems to be more anxious and eager to be discharged      Objective:     VITALS:   Last 24hrs VS reviewed since prior progress note. Most recent are:  Patient Vitals for the past 24 hrs:   BP Temp Temp src Pulse Resp SpO2 Weight   25 1509 137/81 -- --

## 2025-07-26 VITALS
OXYGEN SATURATION: 92 % | TEMPERATURE: 98 F | RESPIRATION RATE: 14 BRPM | SYSTOLIC BLOOD PRESSURE: 159 MMHG | BODY MASS INDEX: 25.13 KG/M2 | DIASTOLIC BLOOD PRESSURE: 69 MMHG | WEIGHT: 206.35 LBS | HEIGHT: 76 IN | HEART RATE: 71 BPM

## 2025-07-26 PROCEDURE — 94640 AIRWAY INHALATION TREATMENT: CPT

## 2025-07-26 PROCEDURE — 94669 MECHANICAL CHEST WALL OSCILL: CPT

## 2025-07-26 PROCEDURE — 6360000002 HC RX W HCPCS: Performed by: INTERNAL MEDICINE

## 2025-07-26 PROCEDURE — 2500000003 HC RX 250 WO HCPCS: Performed by: INTERNAL MEDICINE

## 2025-07-26 PROCEDURE — 2500000003 HC RX 250 WO HCPCS: Performed by: STUDENT IN AN ORGANIZED HEALTH CARE EDUCATION/TRAINING PROGRAM

## 2025-07-26 PROCEDURE — 6370000000 HC RX 637 (ALT 250 FOR IP): Performed by: INTERNAL MEDICINE

## 2025-07-26 PROCEDURE — 6360000002 HC RX W HCPCS

## 2025-07-26 PROCEDURE — 6370000000 HC RX 637 (ALT 250 FOR IP): Performed by: PHYSICIAN ASSISTANT

## 2025-07-26 PROCEDURE — 6370000000 HC RX 637 (ALT 250 FOR IP): Performed by: STUDENT IN AN ORGANIZED HEALTH CARE EDUCATION/TRAINING PROGRAM

## 2025-07-26 PROCEDURE — 2700000000 HC OXYGEN THERAPY PER DAY

## 2025-07-26 RX ORDER — ALBUTEROL SULFATE 90 UG/1
1 INHALANT RESPIRATORY (INHALATION) EVERY 6 HOURS PRN
Qty: 18 G | Refills: 0 | Status: SHIPPED | OUTPATIENT
Start: 2025-07-26

## 2025-07-26 RX ORDER — IPRATROPIUM BROMIDE AND ALBUTEROL SULFATE 2.5; .5 MG/3ML; MG/3ML
1 SOLUTION RESPIRATORY (INHALATION) EVERY 6 HOURS PRN
Qty: 360 ML | Refills: 0 | Status: SHIPPED | OUTPATIENT
Start: 2025-07-26

## 2025-07-26 RX ORDER — NEBULIZER ACCESSORIES
1 KIT MISCELLANEOUS DAILY PRN
Qty: 1 KIT | Refills: 0 | Status: SHIPPED | OUTPATIENT
Start: 2025-07-26

## 2025-07-26 RX ORDER — FLUTICASONE FUROATE, UMECLIDINIUM BROMIDE AND VILANTEROL TRIFENATATE 100; 62.5; 25 UG/1; UG/1; UG/1
1 POWDER RESPIRATORY (INHALATION) DAILY
Qty: 2 EACH | Refills: 0 | Status: SHIPPED | OUTPATIENT
Start: 2025-07-26

## 2025-07-26 RX ORDER — PREDNISONE 10 MG/1
TABLET ORAL
Qty: 32 TABLET | Refills: 0 | Status: SHIPPED | OUTPATIENT
Start: 2025-07-26 | End: 2025-08-10

## 2025-07-26 RX ORDER — DILTIAZEM HCL 60 MG
60 TABLET ORAL EVERY 8 HOURS SCHEDULED
Qty: 120 TABLET | Refills: 0 | Status: SHIPPED | OUTPATIENT
Start: 2025-07-26

## 2025-07-26 RX ADMIN — ARFORMOTEROL TARTRATE: 15 SOLUTION RESPIRATORY (INHALATION) at 07:59

## 2025-07-26 RX ADMIN — OMEGA-3-ACID ETHYL ESTERS CAPSULES 1000 MG: 1 CAPSULE, LIQUID FILLED ORAL at 08:55

## 2025-07-26 RX ADMIN — SODIUM CHLORIDE, PRESERVATIVE FREE 10 ML: 5 INJECTION INTRAVENOUS at 08:55

## 2025-07-26 RX ADMIN — DILTIAZEM HYDROCHLORIDE 60 MG: 60 TABLET, FILM COATED ORAL at 16:02

## 2025-07-26 RX ADMIN — ZINC SULFATE 220 MG (50 MG) CAPSULE 50 MG: CAPSULE at 08:55

## 2025-07-26 RX ADMIN — DILTIAZEM HYDROCHLORIDE 60 MG: 60 TABLET, FILM COATED ORAL at 06:38

## 2025-07-26 RX ADMIN — METHYLPREDNISOLONE SODIUM SUCCINATE 40 MG: 40 INJECTION, POWDER, LYOPHILIZED, FOR SOLUTION INTRAMUSCULAR; INTRAVENOUS at 08:55

## 2025-07-26 RX ADMIN — SODIUM CHLORIDE, PRESERVATIVE FREE 10 ML: 5 INJECTION INTRAVENOUS at 08:56

## 2025-07-26 RX ADMIN — IPRATROPIUM BROMIDE 0.5 MG: 0.5 SOLUTION RESPIRATORY (INHALATION) at 15:01

## 2025-07-26 RX ADMIN — IPRATROPIUM BROMIDE 0.5 MG: 0.5 SOLUTION RESPIRATORY (INHALATION) at 07:54

## 2025-07-26 RX ADMIN — APIXABAN 5 MG: 5 TABLET, FILM COATED ORAL at 08:55

## 2025-07-26 RX ADMIN — HYDROCHLOROTHIAZIDE 25 MG: 25 TABLET ORAL at 08:56

## 2025-07-26 RX ADMIN — ASPIRIN 325 MG: 325 TABLET ORAL at 08:55

## 2025-07-26 RX ADMIN — GUAIFENESIN 1200 MG: 600 TABLET, EXTENDED RELEASE ORAL at 08:55

## 2025-07-26 RX ADMIN — Medication 100 MG: at 08:55

## 2025-07-26 RX ADMIN — OXYCODONE HYDROCHLORIDE AND ACETAMINOPHEN 500 MG: 500 TABLET ORAL at 08:55

## 2025-07-26 RX ADMIN — FOLIC ACID 1000 MCG: 1 TABLET ORAL at 08:55

## 2025-07-26 RX ADMIN — Medication 1000 UNITS: at 08:55

## 2025-07-26 NOTE — PLAN OF CARE
Problem: Neurosensory - Adult  Goal: Achieves stable or improved neurological status  7/26/2025 1618 by Donovan Vance RN  Outcome: Adequate for Discharge  7/26/2025 1341 by Donovan Vance RN  Outcome: Progressing  Goal: Absence of seizures  7/26/2025 1618 by Donovan Vance RN  Outcome: Adequate for Discharge  7/26/2025 1341 by Donovan Vance RN  Outcome: Progressing  Goal: Achieves maximal functionality and self care  7/26/2025 1618 by Donovan Vance RN  Outcome: Adequate for Discharge  7/26/2025 1341 by Donovan Vance RN  Outcome: Progressing     Problem: Respiratory - Adult  Goal: Achieves optimal ventilation and oxygenation  7/26/2025 1618 by Donovan Vance RN  Outcome: Adequate for Discharge  7/26/2025 0801 by Ayana Berumen RT  Outcome: Progressing     Problem: Cardiovascular - Adult  Goal: Maintains optimal cardiac output and hemodynamic stability  7/26/2025 1618 by Donovan Vance RN  Outcome: Adequate for Discharge  7/26/2025 1341 by Donovan Vance RN  Outcome: Progressing  Goal: Absence of cardiac dysrhythmias or at baseline  7/26/2025 1618 by Donovan Vance RN  Outcome: Adequate for Discharge  7/26/2025 1341 by Donovan Vance RN  Outcome: Progressing     Problem: Skin/Tissue Integrity - Adult  Goal: Skin integrity remains intact  7/26/2025 1618 by Donovan Vance RN  Outcome: Adequate for Discharge  7/26/2025 1341 by Donovan Vance RN  Outcome: Progressing     Problem: Musculoskeletal - Adult  Goal: Maintain proper alignment of affected body part  Outcome: Adequate for Discharge     Problem: Gastrointestinal - Adult  Goal: Minimal or absence of nausea and vomiting  7/26/2025 1618 by Donovan Vance RN  Outcome: Adequate for Discharge  7/26/2025 1341 by Donovan Vance RN  Outcome: Progressing  Goal: Maintains or returns to baseline bowel function  7/26/2025 1618 by Donovan Vance RN  Outcome: Adequate for Discharge  7/26/2025 1341 by Pinky,

## 2025-07-26 NOTE — PLAN OF CARE
Problem: Neurosensory - Adult  Goal: Achieves stable or improved neurological status  Outcome: Progressing  Goal: Absence of seizures  Outcome: Progressing  Goal: Achieves maximal functionality and self care  Outcome: Progressing     Problem: Respiratory - Adult  Goal: Achieves optimal ventilation and oxygenation  7/26/2025 0801 by Ayana Berumen, RT  Outcome: Progressing     Problem: Cardiovascular - Adult  Goal: Maintains optimal cardiac output and hemodynamic stability  Outcome: Progressing  Goal: Absence of cardiac dysrhythmias or at baseline  Outcome: Progressing     Problem: Skin/Tissue Integrity - Adult  Goal: Skin integrity remains intact  Outcome: Progressing     Problem: Gastrointestinal - Adult  Goal: Minimal or absence of nausea and vomiting  Outcome: Progressing  Goal: Maintains or returns to baseline bowel function  Outcome: Progressing     Problem: Genitourinary - Adult  Goal: Absence of urinary retention  Outcome: Progressing     Problem: Infection - Adult  Goal: Absence of infection at discharge  Outcome: Progressing  Goal: Absence of infection during hospitalization  Outcome: Progressing

## 2025-07-26 NOTE — PLAN OF CARE
Problem: Neurosensory - Adult  Goal: Achieves stable or improved neurological status  7/25/2025 1444 by Donovan Vance RN  Outcome: Progressing  Goal: Absence of seizures  7/25/2025 1444 by Donovan Vance RN  Outcome: Progressing  Goal: Achieves maximal functionality and self care  7/25/2025 1444 by Donovan Vance RN  Outcome: Progressing     Problem: Respiratory - Adult  Goal: Achieves optimal ventilation and oxygenation  7/25/2025 2222 by Saravanan Zabmrano RN  Outcome: Progressing  7/25/2025 1946 by Shanice Aguilar, RT  Outcome: Progressing     Problem: Cardiovascular - Adult  Goal: Maintains optimal cardiac output and hemodynamic stability  7/25/2025 1444 by Donovan Vance RN  Outcome: Progressing  Goal: Absence of cardiac dysrhythmias or at baseline  7/25/2025 1444 by Donovan Vance RN  Outcome: Progressing     Problem: Skin/Tissue Integrity - Adult  Goal: Skin integrity remains intact  7/25/2025 2222 by Saravanan Zambrano RN  Outcome: Progressing  7/25/2025 1444 by Donovan Vance RN  Outcome: Progressing     Problem: Musculoskeletal - Adult  Goal: Maintain proper alignment of affected body part  7/25/2025 2222 by Saravanan Zambrano RN  Outcome: Progressing  7/25/2025 1444 by Donovan Vance RN  Outcome: Progressing     Problem: Gastrointestinal - Adult  Goal: Minimal or absence of nausea and vomiting  7/25/2025 2222 by Saravanan Zambrano RN  Outcome: Progressing  7/25/2025 1444 by Donovan Vance RN  Outcome: Progressing  Goal: Maintains or returns to baseline bowel function  7/25/2025 1444 by Donovan aVnce RN  Outcome: Progressing  Goal: Maintains adequate nutritional intake  7/25/2025 1444 by Donovan Vance RN  Outcome: Progressing     Problem: Genitourinary - Adult  Goal: Absence of urinary retention  7/25/2025 2222 by Saravanan Zambrano RN  Outcome: Progressing  7/25/2025 1444 by Donovan Vance RN  Outcome: Progressing

## 2025-07-26 NOTE — PROGRESS NOTES
Pulse oximetry assessment   89% at rest on room air (if 88% or less, skip next steps)  87% while ambulating on room air  91% at rest on 2LPM  89% while ambulating on 2LPM

## 2025-07-26 NOTE — PROGRESS NOTES
EP/ ARRHYTHMIA Progress Note    Patient ID:  Patient: Ranulfo Hernandez  MRN: [id number]  Age: 70 y.o.  : 1955    Date of  Admission: 2025  8:50 AM   PCP:  Kim Trinh MD    Assessment:   Paroxysmal atrial fibrillation.  COPD exacerbation.  Alcohol excess history..  Remote IV methamphetamine use.  History of prostate CA.  Full code.    Plan:     Continue Lovenox until he commits to oral anticoagulation.  Change to oral diltiazem tomorrow.  Agree with treating for COPD exacerbation.  Echo below . Preserved LVEF     update:  Change to oral diltiazem.  Change enoxaparin to Eliquis.  Echo pending.     update:  Remains in sinus (with PAC's).  Continue diltiazem, Eliquis.  Echo showed EF 60-65%, thick interatrial septum, normal R heart, no effusion.  The aortic root is slightly enlarged, though he's a bigger krystal (6'4\").  I am OK with discharge tomorrow AM from a cardiac standpoint.  He can follow-up in the office within one month or so.  He reports a history of abdominal aortic aneurysm (3.4 x 3 cm on duplex in 2018), this can be imaged as an outpatient in our office to update.    2025 update: Remains in NSR. VSS. Continue Dilt and Eliquis. He is staying today due to increasing cough and congestion.    2025 NP update: Tele with frequent PACs. No AF. Remains on oxygen. K 4.4, Mg 2.2.   Still on steroids for  exacerbation. Nothing further to add today.     update by me:  As above.  OK from a cardiac standpoint for discharge.  Will sign off.  He will call the office for an appointment.    :  In sinus.  OK for discharge from CV standpoint.  Will be available as needed.    Left Ventricle Normal left ventricular systolic function with a visually estimated EF of 60 - 65%. Left ventricle size is normal. Mildly increased wall thickness. Normal wall motion.   Left Atrium Left atrium size is normal.   Interatrial Septum Incidental note of lipomatous intra-atrial

## 2025-07-26 NOTE — PROGRESS NOTES
Bedside shift change report given to JOSE A Lua (oncoming nurse) by JOSE A Coe (offgoing nurse). Report included the following information Nurse Handoff Report.

## 2025-07-26 NOTE — PROGRESS NOTES
EP/ ARRHYTHMIA Progress Note    Patient ID:  Patient: Ranulfo Hernandez  MRN: [id number]  Age: 70 y.o.  : 1955    Date of  Admission: 2025  8:50 AM   PCP:  Kim Trinh MD    Assessment:   Paroxysmal atrial fibrillation.  COPD exacerbation.  Alcohol excess history..  Remote IV methamphetamine use.  History of prostate CA.  Full code.    Plan:     Continue Lovenox until he commits to oral anticoagulation.  Change to oral diltiazem tomorrow.  Agree with treating for COPD exacerbation.  Echo below . Preserved LVEF     update:  Change to oral diltiazem.  Change enoxaparin to Eliquis.  Echo pending.     update:  Remains in sinus (with PAC's).  Continue diltiazem, Eliquis.  Echo showed EF 60-65%, thick interatrial septum, normal R heart, no effusion.  The aortic root is slightly enlarged, though he's a bigger krystal (6'4\").  I am OK with discharge tomorrow AM from a cardiac standpoint.  He can follow-up in the office within one month or so.  He reports a history of abdominal aortic aneurysm (3.4 x 3 cm on duplex in 2018), this can be imaged as an outpatient in our office to update.    2025 update: Remains in NSR. VSS. Continue Dilt and Eliquis. He is staying today due to increasing cough and congestion.    2025 NP update: Tele with frequent PACs. No AF. Remains on oxygen. K 4.4, Mg 2.2.   Still on steroids for  exacerbation. Nothing further to add today.     update by me:  As above.  OK from a cardiac standpoint for discharge.  Will sign off.  He will call the office for an appointment.    Left Ventricle Normal left ventricular systolic function with a visually estimated EF of 60 - 65%. Left ventricle size is normal. Mildly increased wall thickness. Normal wall motion.   Left Atrium Left atrium size is normal.   Interatrial Septum Incidental note of lipomatous intra-atrial septum.   Right Ventricle Right ventricle size is normal. Normal systolic function.

## 2025-07-26 NOTE — CARE COORDINATION
Update - 1430 PM: Home O2 and nebulizer to be delivered at 1500PM by [employer], information placed on AVS. Unit RN confirmed pt has ride to go home after O2 and nebulizer are delivered.    Chart reviewed. ZORA received call from MD noting pt will need home O2 and a nebulizer. CM acknowledged O2 test. CM sent DME orders to [employer] for review.     BEAR Castrejon  Care Management  [facility]

## 2025-07-26 NOTE — DISCHARGE SUMMARY
Discharge Summary    Name: Ranulfo Hernandez  [id number]  YOB: 1955 (Age: 70 y.o.)   Date of Admission: 7/21/2025  Date of Discharge: 7/26/2025  Attending Physician: Skylar Sandy MD    Discharge Diagnosis:     Consultations:  IP CONSULT TO CARDIOLOGY  IP CONSULT TO PULMONOLOGY  IP CONSULT TO SOCIAL WORK  IP CONSULT TO CASE MANAGEMENT  IP CONSULT TO CASE MANAGEMENT      Brief Admission History/Reason for Admission Per Pasha Ralph MD:   Ranulfo Hernandez is a 70 y.o.  male with PMHx significant for COPD, prostate cancer s/p prostatectomy, A-fib not on anticoagulation, HLD, hypothyroidism who presents with shortness of breath and chest pain.  Patient reports that he is started experiencing shortness of breath with chest tightness for the past couple of days.  Reports that he has uses his albuterol rescue inhaler about 20 times yesterday.  Denies any sick contacts.  But patient reports that he has been experiencing for the past couple of days diarrhea, chills, nausea, suspected that could be related to food poisoning or fishing trip.  Does endorse some chills but no fevers, endorses cough.  Does have history of prostate cancer s/p prostatectomy.  We were asked to admit for work up and evaluation of the above problems.        Brief Hospital Course by Main Problems:     Acute COPD exacerbation  Treated with IV steroids, transition to p.o. steroids on discharge with tapering dose  Completed 3 days of azithromycin  Pulmonology is on board  Patient's Anoro discontinued and will be discharging with Olivia  Nebs ordered and discussed with patient to continue the nebulization every 6 hours while awake for at least 3 to 5 days to get better and then as needed  Can take over-the-counter Mucinex  Ambulatory pulse ox will require home oxygen on discharge  notified    A-fib with RVR  Status post Cardizem drip  Switched to oral Cardizem 60 every 8 hours  Switch to  Tabs     vitamin D 25 MCG (1000 UT) Caps     zinc gluconate 50 MG tablet            STOP taking these medications      amLODIPine 10 MG tablet  Commonly known as: NORVASC     aspirin 325 MG tablet     dextromethorphan-guaiFENesin  MG per extended release tablet  Commonly known as: MUCINEX DM     Foltanx RF 3-90.314-2-35 MG Caps     umeclidinium-vilanterol 62.5-25 MCG/ACT inhaler  Commonly known as: ANORO ELLIPTA               Where to Get Your Medications        These medications were sent to [facility] - [place], [place] - [address] - P [phone] - F [phone] [address], [facility] [address]      Phone: [phone]   albuterol sulfate  (90 Base) MCG/ACT inhaler  apixaban 5 MG Tabs tablet  dilTIAZem 60 MG immediate release tablet  ipratropium 0.5 mg-albuterol 2.5 mg 0.5-2.5 (3) MG/3ML Soln nebulizer solution  Nebulizer/Tubing/Mouthpiece Kit  predniSONE 10 MG tablet  Trelegy Ellipta 100-62.5-25 MCG/ACT Aepb inhaler             DISPOSITION:    Home with Family: x      Home with HH/PT/OT/RN:    SNF/LTC:    ROGER:    OTHER:            Code status:   Recommended diet: cardiac diet  Recommended activity: activity as tolerated  Wound care: None      Follow up with:   PCP : Kim Trinh MD    Substance Resources  -[facility]: Mark Ngo [phone]   -[facility]: [phone]   -[facility]: [phone]   [facility]: [phone] or [phone]  Follow up  Please contact for assistance with substance use needs.    Kim Trinh MD  [address] [address] [phone]    Go on 7/31/2025  At 1:30PM for a PCP hospital follow up.    [employer]  [phone]  Follow up  This is the company providing your home O2 and nebulizer.          Total time in minutes spent coordinating this discharge (includes going over instructions, follow-up, prescriptions, and preparing report for sign

## 2025-07-26 NOTE — PROGRESS NOTES
End of Shift Note    Bedside shift change report given to JOSE A Lua (oncoming nurse) by Saravanan Zambrano RN (offgoing nurse).  Report included the following information SBAR, Kardex, Intake/Output, MAR, Recent Results, Med Rec Status, Alarm Parameters , and Quality Measures    Shift worked:  Night     Shift summary and any significant changes:     No acute event overnight. On 3L oxygen .     Concerns for physician to address:        Zone phone for oncoming shift:           Activity:  Level of Assistance: Independent  Number times ambulated in hallways past shift: 0  Number of times OOB to chair past shift: 1    Cardiac:   Cardiac Monitoring: Yes      Cardiac Rhythm: Sinus rhythm    Access:  Current line(s): PIV     Genitourinary:   Urinary Status: Voiding    Respiratory:   O2 Device: Nasal cannula  Chronic home O2 use?: NO  Incentive spirometer at bedside: YES    GI:  Last BM (including prior to admit): 07/20/25  Current diet:  DIET ONE TIME MESSAGE;  ADULT DIET; Regular  DIET ONE TIME MESSAGE;  Passing flatus: YES    Pain Management:   Patient states pain is manageable on current regimen: YES    Skin:  Dioni Scale Score: 20  Interventions: Wound Offloading (Prevention Methods): Repositioning    Patient Safety:  Fall Risk: Nursing Judgement-Fall Risk High(Add Comments): No  Fall Risk Interventions  Nursing Judgement-Fall Risk High(Add Comments): No  Toilet Every 2 Hours-In Advance of Need: Yes  Hourly Visual Checks: In bed, Eyes closed  Fall Visual Posted: Armband, Socks  Room Door Open: Deferred to promote rest  Alarm On: Bed  Patient Moved Closer to Nursing Station: No    Active Consults:   IP CONSULT TO CARDIOLOGY  IP CONSULT TO PULMONOLOGY  IP CONSULT TO SOCIAL WORK    Length of Stay:  Expected LOS: 6  Actual LOS: 5    Saravanan Zambrano RN

## 2025-07-26 NOTE — PROGRESS NOTES

## 2025-08-18 ENCOUNTER — TELEPHONE (OUTPATIENT)
Facility: CLINIC | Age: 70
End: 2025-08-18

## 2025-08-20 ENCOUNTER — TELEPHONE (OUTPATIENT)
Facility: CLINIC | Age: 70
End: 2025-08-20

## 2025-08-22 ENCOUNTER — TELEPHONE (OUTPATIENT)
Facility: CLINIC | Age: 70
End: 2025-08-22

## 2025-08-22 DIAGNOSIS — I48.91 ATRIAL FIBRILLATION, UNSPECIFIED TYPE (HCC): Primary | ICD-10-CM

## (undated) DEVICE — MASTISOL ADHESIVE LIQ 2/3ML

## (undated) DEVICE — BANDAGE COMPR W6INXL5YD WHT BGE POLY COT M E WRP WV HK AND

## (undated) DEVICE — SPONGE GZ W4XL4IN COT 12 PLY TYP VII WVN C FLD DSGN STERILE

## (undated) DEVICE — MINOR BASIN -SMH: Brand: MEDLINE INDUSTRIES, INC.

## (undated) DEVICE — DRAPE,EXTREMITY,89X128,STERILE: Brand: MEDLINE

## (undated) DEVICE — SUTURE VICRYL COAT SZ 4-0 L18IN ABSRB UD L16MM PC-3 3/8 CIR J845G

## (undated) DEVICE — GLOVE ORANGE PI 7 1/2   MSG9075

## (undated) DEVICE — EXTREMITY-MRMC: Brand: MEDLINE INDUSTRIES, INC.

## (undated) DEVICE — SYRINGE IRRIG 60ML SFT PLIABLE BLB EZ TO GRP 1 HND USE W/

## (undated) DEVICE — DISPOSABLE TOURNIQUET CUFF SINGLE BLADDER, DUAL PORT AND QUICK CONNECT CONNECTOR: Brand: COLOR CUFF

## (undated) DEVICE — HYPODERMIC SAFETY NEEDLE: Brand: MONOJECT

## (undated) DEVICE — TUBING, SUCTION, 1/4" X 12', STRAIGHT: Brand: MEDLINE

## (undated) DEVICE — TUBING SUCT 10FR MAL ALUM SHFT FN CAP VENT UNIV CONN W/ OBT

## (undated) DEVICE — SYRINGE MED 10ML LUERLOCK TIP W/O SFTY DISP

## (undated) DEVICE — X-RAY DETECTABLE SPONGES,16 PLY: Brand: VISTEC

## (undated) DEVICE — SOLUTION IRRIG 1000ML 0.9% SOD CHL USP POUR PLAS BTL

## (undated) DEVICE — SUTURE NONABSORBABLE MONOFILAMENT 3-0 PS-1 18 IN BLK ETHILON 1663H

## (undated) DEVICE — BASIN EMSIS 16OZ G PLAS KID SHP MOLD GRAD FOR ORAL HYG AND

## (undated) DEVICE — Device: Brand: JELCO

## (undated) DEVICE — BANDAGE,ELASTIC,ESMARK,STERILE,4"X9',LF: Brand: MEDLINE

## (undated) DEVICE — ELECTRODE PT RET AD L9FT HI MOIST COND ADH HYDRGEL CORDED

## (undated) DEVICE — PADDING CAST 4 INX5 YD STRL

## (undated) DEVICE — SOLUTION IRRIG 500ML 0.9% SOD CHLO USP POUR PLAS BTL